# Patient Record
Sex: FEMALE | Race: WHITE | Employment: PART TIME | URBAN - METROPOLITAN AREA
[De-identification: names, ages, dates, MRNs, and addresses within clinical notes are randomized per-mention and may not be internally consistent; named-entity substitution may affect disease eponyms.]

---

## 2017-10-19 ENCOUNTER — TRANSFERRED RECORDS (OUTPATIENT)
Dept: HEALTH INFORMATION MANAGEMENT | Facility: CLINIC | Age: 37
End: 2017-10-19

## 2019-02-27 ENCOUNTER — TRANSFERRED RECORDS (OUTPATIENT)
Dept: HEALTH INFORMATION MANAGEMENT | Facility: CLINIC | Age: 39
End: 2019-02-27

## 2019-06-05 ENCOUNTER — TRANSFERRED RECORDS (OUTPATIENT)
Dept: HEALTH INFORMATION MANAGEMENT | Facility: CLINIC | Age: 39
End: 2019-06-05

## 2019-08-14 ENCOUNTER — TRANSFERRED RECORDS (OUTPATIENT)
Dept: HEALTH INFORMATION MANAGEMENT | Facility: CLINIC | Age: 39
End: 2019-08-14

## 2019-09-12 ENCOUNTER — TRANSFERRED RECORDS (OUTPATIENT)
Dept: HEALTH INFORMATION MANAGEMENT | Facility: CLINIC | Age: 39
End: 2019-09-12

## 2019-09-13 ENCOUNTER — TRANSFERRED RECORDS (OUTPATIENT)
Dept: HEALTH INFORMATION MANAGEMENT | Facility: CLINIC | Age: 39
End: 2019-09-13

## 2019-09-17 ENCOUNTER — TRANSFERRED RECORDS (OUTPATIENT)
Dept: HEALTH INFORMATION MANAGEMENT | Facility: CLINIC | Age: 39
End: 2019-09-17

## 2019-09-25 ENCOUNTER — TRANSFERRED RECORDS (OUTPATIENT)
Dept: HEALTH INFORMATION MANAGEMENT | Facility: CLINIC | Age: 39
End: 2019-09-25

## 2019-10-04 DIAGNOSIS — J45.909 ASTHMA: Primary | ICD-10-CM

## 2019-10-07 NOTE — TELEPHONE ENCOUNTER
RECORDS RECEIVED FROM: external    DATE RECEIVED: 12/19/19   NOTES STATUS DETAILS   OFFICE NOTE from referring provider Internal 10/4/19 THEA SCHAFER    OFFICE NOTE from other specialist Internal 10/4/19 THEA SCHAFER    DISCHARGE SUMMARY from hospital N/A    DISCHARGE REPORT from the ER N/A    MEDICATION LIST N/A    IMAGING  (NEED IMAGES AND REPORTS)     CT SCAN Received 10.19.17   CHEST XRAY (CXR) Received 2.27.19  6.8.18  1.2.18  10.18.18   TESTS     PULMONARY FUNCTION TESTING (PFT) In process  Scheduled 12/19/19      Action 10/7/19    Action Taken Records request was sent out 10/7/19 to [facility] for CXR and Chest CT        Action 10/7/19    Action Taken Records of appt report and PFTs recived from [facility] was sent into [facility] 10/7/19.        Action 10.14.19   Action Taken Pulled chest images     Action 10/29/19   Action Taken [facility] images pushed into PACS

## 2019-10-10 ENCOUNTER — TRANSFERRED RECORDS (OUTPATIENT)
Dept: HEALTH INFORMATION MANAGEMENT | Facility: CLINIC | Age: 39
End: 2019-10-10

## 2019-12-02 ENCOUNTER — DOCUMENTATION ONLY (OUTPATIENT)
Dept: CARE COORDINATION | Facility: CLINIC | Age: 39
End: 2019-12-02

## 2019-12-19 ENCOUNTER — ANCILLARY PROCEDURE (OUTPATIENT)
Dept: GENERAL RADIOLOGY | Facility: CLINIC | Age: 39
End: 2019-12-19
Payer: MEDICARE

## 2019-12-19 ENCOUNTER — OFFICE VISIT (OUTPATIENT)
Dept: PULMONOLOGY | Facility: CLINIC | Age: 39
End: 2019-12-19
Payer: MEDICARE

## 2019-12-19 ENCOUNTER — ANCILLARY PROCEDURE (OUTPATIENT)
Dept: CT IMAGING | Facility: CLINIC | Age: 39
End: 2019-12-19
Payer: MEDICARE

## 2019-12-19 ENCOUNTER — PRE VISIT (OUTPATIENT)
Dept: PULMONOLOGY | Facility: CLINIC | Age: 39
End: 2019-12-19

## 2019-12-19 VITALS
DIASTOLIC BLOOD PRESSURE: 85 MMHG | WEIGHT: 260 LBS | HEIGHT: 67 IN | RESPIRATION RATE: 17 BRPM | HEART RATE: 85 BPM | BODY MASS INDEX: 40.81 KG/M2 | OXYGEN SATURATION: 96 % | SYSTOLIC BLOOD PRESSURE: 134 MMHG

## 2019-12-19 DIAGNOSIS — J45.50: Primary | ICD-10-CM

## 2019-12-19 DIAGNOSIS — R06.02 SHORTNESS OF BREATH: ICD-10-CM

## 2019-12-19 DIAGNOSIS — J45.50: ICD-10-CM

## 2019-12-19 DIAGNOSIS — J45.909 ASTHMA: ICD-10-CM

## 2019-12-19 LAB
BASOPHILS # BLD AUTO: 0.1 10E9/L (ref 0–0.2)
BASOPHILS NFR BLD AUTO: 0.8 %
CRP SERPL-MCNC: 9.2 MG/L (ref 0–8)
DIFFERENTIAL METHOD BLD: NORMAL
EOSINOPHIL # BLD AUTO: 0.1 10E9/L (ref 0–0.7)
EOSINOPHIL NFR BLD AUTO: 1.3 %
ERYTHROCYTE [DISTWIDTH] IN BLOOD BY AUTOMATED COUNT: 13 % (ref 10–15)
ERYTHROCYTE [SEDIMENTATION RATE] IN BLOOD BY WESTERGREN METHOD: 7 MM/H (ref 0–20)
GRAM STN SPEC: NORMAL
HCT VFR BLD AUTO: 44.2 % (ref 35–47)
HGB BLD-MCNC: 14.6 G/DL (ref 11.7–15.7)
IMM GRANULOCYTES # BLD: 0 10E9/L (ref 0–0.4)
IMM GRANULOCYTES NFR BLD: 0.4 %
LYMPHOCYTES # BLD AUTO: 2.4 10E9/L (ref 0.8–5.3)
LYMPHOCYTES NFR BLD AUTO: 24.3 %
Lab: NORMAL
MCH RBC QN AUTO: 32.1 PG (ref 26.5–33)
MCHC RBC AUTO-ENTMCNC: 33 G/DL (ref 31.5–36.5)
MCV RBC AUTO: 97 FL (ref 78–100)
MONOCYTES # BLD AUTO: 0.7 10E9/L (ref 0–1.3)
MONOCYTES NFR BLD AUTO: 7 %
NEUTROPHILS # BLD AUTO: 6.4 10E9/L (ref 1.6–8.3)
NEUTROPHILS NFR BLD AUTO: 66.2 %
NRBC # BLD AUTO: 0 10*3/UL
NRBC BLD AUTO-RTO: 0 /100
PLATELET # BLD AUTO: 405 10E9/L (ref 150–450)
RBC # BLD AUTO: 4.55 10E12/L (ref 3.8–5.2)
SPECIMEN SOURCE: NORMAL
WBC # BLD AUTO: 9.7 10E9/L (ref 4–11)

## 2019-12-19 PROCEDURE — 86331 IMMUNODIFFUSION OUCHTERLONY: CPT

## 2019-12-19 PROCEDURE — 86200 CCP ANTIBODY: CPT

## 2019-12-19 PROCEDURE — 85025 COMPLETE CBC W/AUTO DIFF WBC: CPT

## 2019-12-19 PROCEDURE — 86235 NUCLEAR ANTIGEN ANTIBODY: CPT

## 2019-12-19 PROCEDURE — 86606 ASPERGILLUS ANTIBODY: CPT | Mod: 91

## 2019-12-19 PROCEDURE — 87206 SMEAR FLUORESCENT/ACID STAI: CPT

## 2019-12-19 PROCEDURE — 86140 C-REACTIVE PROTEIN: CPT

## 2019-12-19 PROCEDURE — 87106 FUNGI IDENTIFICATION YEAST: CPT

## 2019-12-19 PROCEDURE — 87116 MYCOBACTERIA CULTURE: CPT

## 2019-12-19 PROCEDURE — G0463 HOSPITAL OUTPT CLINIC VISIT: HCPCS

## 2019-12-19 PROCEDURE — 87070 CULTURE OTHR SPECIMN AEROBIC: CPT

## 2019-12-19 PROCEDURE — 82784 ASSAY IGA/IGD/IGG/IGM EACH: CPT

## 2019-12-19 PROCEDURE — 86255 FLUORESCENT ANTIBODY SCREEN: CPT

## 2019-12-19 PROCEDURE — 82785 ASSAY OF IGE: CPT

## 2019-12-19 PROCEDURE — 87077 CULTURE AEROBIC IDENTIFY: CPT

## 2019-12-19 PROCEDURE — 86431 RHEUMATOID FACTOR QUANT: CPT

## 2019-12-19 PROCEDURE — 87205 SMEAR GRAM STAIN: CPT

## 2019-12-19 PROCEDURE — 87107 FUNGI IDENTIFICATION MOLD: CPT | Mod: 59

## 2019-12-19 PROCEDURE — 36415 COLL VENOUS BLD VENIPUNCTURE: CPT

## 2019-12-19 PROCEDURE — 87015 SPECIMEN INFECT AGNT CONCNTJ: CPT

## 2019-12-19 PROCEDURE — 82787 IGG 1 2 3 OR 4 EACH: CPT

## 2019-12-19 PROCEDURE — 87185 SC STD ENZYME DETCJ PER NZM: CPT

## 2019-12-19 PROCEDURE — 86003 ALLG SPEC IGE CRUDE XTRC EA: CPT

## 2019-12-19 PROCEDURE — 86038 ANTINUCLEAR ANTIBODIES: CPT

## 2019-12-19 PROCEDURE — 85652 RBC SED RATE AUTOMATED: CPT

## 2019-12-19 PROCEDURE — 87102 FUNGUS ISOLATION CULTURE: CPT

## 2019-12-19 RX ORDER — IBUPROFEN 800 MG/1
800 TABLET, FILM COATED ORAL
COMMUNITY
Start: 2019-10-17

## 2019-12-19 RX ORDER — METHOCARBAMOL 500 MG/1
TABLET, FILM COATED ORAL
COMMUNITY
Start: 2018-11-03

## 2019-12-19 RX ORDER — ALBUTEROL SULFATE 0.83 MG/ML
2.5 SOLUTION RESPIRATORY (INHALATION)
COMMUNITY
Start: 2018-01-02

## 2019-12-19 RX ORDER — DEXTROAMPHETAMINE SACCHARATE, AMPHETAMINE ASPARTATE, DEXTROAMPHETAMINE SULFATE AND AMPHETAMINE SULFATE 5; 5; 5; 5 MG/1; MG/1; MG/1; MG/1
20 TABLET ORAL
COMMUNITY
Start: 2019-08-02

## 2019-12-19 RX ORDER — GABAPENTIN 300 MG/1
1200 CAPSULE ORAL
COMMUNITY
Start: 2019-07-31

## 2019-12-19 RX ORDER — MUPIROCIN 20 MG/G
OINTMENT TOPICAL
COMMUNITY
Start: 2019-11-18

## 2019-12-19 RX ORDER — FLUTICASONE PROPIONATE 50 MCG
1 SPRAY, SUSPENSION (ML) NASAL
COMMUNITY
Start: 2019-12-17

## 2019-12-19 RX ORDER — ACETAMINOPHEN 500 MG
500 TABLET ORAL
COMMUNITY

## 2019-12-19 RX ORDER — LAMOTRIGINE 25 MG/1
200 TABLET ORAL 2 TIMES DAILY
COMMUNITY
Start: 2018-09-27

## 2019-12-19 RX ORDER — ADAPALENE 45 G/G
GEL TOPICAL
COMMUNITY
Start: 2019-11-18

## 2019-12-19 RX ORDER — CHLORHEXIDINE GLUCONATE ORAL RINSE 1.2 MG/ML
SOLUTION DENTAL
COMMUNITY
Start: 2019-09-21

## 2019-12-19 ASSESSMENT — MIFFLIN-ST. JEOR: SCORE: 1876.98

## 2019-12-19 ASSESSMENT — ASTHMA QUESTIONNAIRES
QUESTION_1 LAST FOUR WEEKS HOW MUCH OF THE TIME DID YOUR ASTHMA KEEP YOU FROM GETTING AS MUCH DONE AT WORK, SCHOOL OR AT HOME: MOST OF THE TIME
QUESTION_4 LAST FOUR WEEKS HOW OFTEN HAVE YOU USED YOUR RESCUE INHALER OR NEBULIZER MEDICATION (SUCH AS ALBUTEROL): ONE OR TWO TIMES PER DAY
ACT_TOTALSCORE: 8
QUESTION_3 LAST FOUR WEEKS HOW OFTEN DID YOUR ASTHMA SYMPTOMS (WHEEZING, COUGHING, SHORTNESS OF BREATH, CHEST TIGHTNESS OR PAIN) WAKE YOU UP AT NIGHT OR EARLIER THAN USUAL IN THE MORNING: FOUR OR MORE NIGHTS A WEEK
QUESTION_5 LAST FOUR WEEKS HOW WOULD YOU RATE YOUR ASTHMA CONTROL: POORLY CONTROLLED
EMERGENCY_ROOM_LAST_YEAR_TOTAL: THREE OR MORE
QUESTION_2 LAST FOUR WEEKS HOW OFTEN HAVE YOU HAD SHORTNESS OF BREATH: MORE THAN ONCE A DAY

## 2019-12-19 ASSESSMENT — PAIN SCALES - GENERAL: PAINLEVEL: EXTREME PAIN (8)

## 2019-12-19 NOTE — PROGRESS NOTES
[facility]  Pulmonary Medicine  Visit Clinic Note  December 19, 2019         ASSESSMENT & PLAN     Cough and Shortness of Breath: She has been evaluated at [facility] and with Dr. Thomas.  I do not have the [facility] records.  She is noted to have elevated FeNO levels, and so her ICS has been increased.  She is on breo and arnuity.  From her history, she has been on 10 courses of antibiotics and 2 rounds of prednisone in the last year.     I would like to review her her [facility] records and see what the work up is besides FeNO for asthma.  From her history and 9/2019 CT scan, infection seems to be more of an issue.  High dose ICS could potentially be putting her at risk for infectious complications.      If it is not asthma, diagnoses to consider would be CVID, EGPA, ABPA, HP, GERD with aspiration, Mycobacterial infection    I will get labs including CRP, ESR, ABPA, ANCA, IRVIN IgE, IgG, HP panel. Will plan on sputum cultures and a sinus CT scan.     I will give her a phone call once I receive these results and we can discuss next steps.     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 She has had asthma since childhood and recently has had her asthma managed by her PCP, Dr. Godfrey, and by a pulmonologist, Dr. New at [facility]. She also has had her asthma evaluated at [facility].     Today she would like to discuss her asthma management and recurrent infections. She notes frequent productive coughing daily for ~ 1.5 years. The sputum production has varied in color, consistency, and amount. At times she notes feeling lightheaded and at  "times has chest and back pain exacerbated by coughing. Patient endorses frequent wheezing. She currently uses Zyrtec, Montelukast, and Breo and Ellipta to manage her respiratory symptoms.     Of note, she mentions that she is allergic to aspirin, and one time after taking Excedrin she was hospitalized d/t unclear but concerning respiratory symptoms. She uses several OTC pain medications daily.     Today she denies fevers, chills, weight loss. Denies recent travel or exposure to inhalants.     Per chart review, she was last seen by Dr. New on 08/15/19. \"She has been on fluticasone 400 mcg per day in the form of Breo Ellipta 200/25 and Arnuity 200 mcg. She had multiple cultures positive for hemophilus influenza, and was on multiple courses of antibiotics including Augmentin and Bactrim. Levofloxacin was avoided d/t a history of Achilles tendon rupture. She has a history of severe persistent asthma with elevated nitric oxide levels in the past. In the past when she was followed by pulmonary at [facility], they titrated Asmanex up to 12 puffs b.i.d. based on fractional exhaled nitric oxide levels. She had an elevated nitric oxide level at 114 on 9/26/2018, which improved to 39 on 10/17/2008 and was 19 on 5/30/2019. Now on the 400 mcg of fluticasone, she is at 28.\"      SH:  - Non-smoker, 2nd hand smoke (parents); no vaping  - Rare marijuana use  - Lives in [place]  - Work as a PCA  - moderate EtOH use           Past Medical and Surgical History:     Past Medical History:  Past Medical History:   Diagnosis Date     ADHD (attention deficit hyperactivity disorder)     Asthma   Persistent asthma, steroid dependent     Bilateral chronic knee pain     Bipolar 1 disorder (HCC)     Delayed emergence from general anesthesia     Depression     Fibromyalgia     GERD (gastroesophageal reflux disease)     HCD (health care directive) 10/10/2017     DAPHNE (obstructive sleep apnea)     Osteomyelitis (HCC)     Osteopenia     " Seasonal allergic rhinitis     Seizures (HCC)   ? partial seizures / none for somr time. due brain trauma obtained in 2016     Vitamin D deficiency     Past Surgical History:   Procedure Laterality Date     GASTRIC FUNDOPLICATION 1998     KNEE SURGERY Right 4/2009     KNEE SURGERY Left 12/2004     REMOVAL GALLBLADDER     SIMON-EN-Y GASTRIC BYPASS 12/06     SINUS SURGERY     TOTAL KNEE ARTHROPLASTY Left 10/10/2017   Procedure: LEFT ARTHROPLASTY TOTAL KNEE; Surgeon: Yakov Choi MD; Location: [facility] MAIN OR         Family History:     History reviewed. No pertinent family history.         Social History:     Social History     Socioeconomic History     Marital status: Single     Spouse name: Not on file     Number of children: Not on file     Years of education: Not on file     Highest education level: Not on file   Occupational History     Not on file   Social Needs     Financial resource strain: Not on file     Food insecurity:     Worry: Not on file     Inability: Not on file     Transportation needs:     Medical: Not on file     Non-medical: Not on file   Tobacco Use     Smoking status: Never Smoker     Smokeless tobacco: Never Used   Substance and Sexual Activity     Alcohol use: Yes     Comment: occassional      Drug use: Yes     Types: Marijuana     Comment: rare     Sexual activity: Not on file   Lifestyle     Physical activity:     Days per week: Not on file     Minutes per session: Not on file     Stress: Not on file   Relationships     Social connections:     Talks on phone: Not on file     Gets together: Not on file     Attends Jew service: Not on file     Active member of club or organization: Not on file     Attends meetings of clubs or organizations: Not on file     Relationship status: Not on file     Intimate partner violence:     Fear of current or ex partner: Not on file     Emotionally abused: Not on file     Physically abused: Not on file     Forced sexual activity: Not on file  "  Other Topics Concern     Parent/sibling w/ CABG, MI or angioplasty before 65F 55M? Not Asked   Social History Narrative     Not on file            Medications:     Current Outpatient Medications   Medication     acetaminophen (TYLENOL) 500 MG tablet     adapalene (DIFFERIN) 0.1 % external gel     albuterol (PROVENTIL) (2.5 MG/3ML) 0.083% neb solution     amphetamine-dextroamphetamine (ADDERALL) 20 MG tablet     Cetirizine HCl (ZYRTEC PO)     chlorhexidine (PERIDEX) 0.12 % solution     cholecalciferol ( ULTRA STRENGTH) 50 MCG (2000 UT) CAPS     fluticasone (FLONASE) 50 MCG/ACT nasal spray     Fluticasone Furoate (ARNUITY ELLIPTA IN)     Fluticasone Furoate-Vilanterol (BREO ELLIPTA IN)     gabapentin (NEURONTIN) 300 MG capsule     ibuprofen (ADVIL/MOTRIN) 800 MG tablet     lamoTRIgine (LAMICTAL) 25 MG tablet     methocarbamol (ROBAXIN) 500 MG tablet     minoxidil (ROGAINE) 2 % external solution     Montelukast Sodium (SINGULAIR PO)     mupirocin (BACTROBAN) 2 % external ointment     omeprazole (PRILOSEC) 20 MG DR capsule     No current facility-administered medications for this visit.             Review of Systems:     A complete review of systems was otherwise negative except as noted in the HPI.    PHYSICAL EXAM:  /85   Pulse 85   Resp 17   Ht 1.702 m (5' 7\")   Wt 117.9 kg (260 lb)   SpO2 96%   BMI 40.72 kg/m       General: accompanied by daughter, No apparent distress  Eyes: Anicteric  Nose: Nasal mucosa with no edema or hyperemia.  No polyps  Ears: Hearing grossly normal  Mouth: Oral mucosa is moist, without any lesions. No oropharyngeal exudate.  Neck: supple, no thyromegaly  Lymphatics: No cervical or supraclavicular nodes  Respiratory: Good air movement. No crackles. No rhonchi. No wheezes  Cardiac: RRR, normal S1, S2. No murmurs.   Abdomen: Soft, NT/ND  Musculoskeletal: Extremities normal. No clubbing. No cyanosis. No edema.  Skin: No rash on limited exam  Neuro: Normal mentation. Normal " speech.  Psych: tangential but redirectable         Data:   All laboratory and imaging data reviewed.      PFT:   FEV1/FVC ratio 78%. FEV1 2.78 (84% predicted). FVC 3.54 (87% predicted). DLCO 12.97 (45% predicted)       PFT Interpretation:  No airflow obstruction. Impaired diffusion capacity.     CXR:   12/19/19 (pending)    Chest CT:   9/2019: tree-in-bud opacities. Left lower lobe consolidative changes.

## 2019-12-20 LAB
A FUMIGATUS IGE QN: <0.1 KU(A)/L
IGG SERPL-MCNC: 509 MG/DL (ref 610–1616)
IGG1 SER-MCNC: 339 MG/DL (ref 382–929)
IGG2 SER-MCNC: 133 MG/DL (ref 242–700)
IGG3 SER-MCNC: 25 MG/DL (ref 22–176)
IGG4 SER-MCNC: 11 MG/DL (ref 4–86)
RHEUMATOID FACT SER NEPH-ACNC: 10 IU/ML (ref 0–20)

## 2019-12-20 ASSESSMENT — ASTHMA QUESTIONNAIRES: ACT_TOTALSCORE: 8

## 2019-12-21 LAB
A FUMIGATUS IGG SER-MCNC: 6.77 MCG/ML
ENA SCL70 IGG SER IA-ACNC: <0.2 AI (ref 0–0.9)

## 2019-12-22 LAB
ACID FAST STN SPEC QL: NORMAL
BACTERIA SPEC CULT: ABNORMAL
BACTERIA SPEC CULT: ABNORMAL
SPECIMEN SOURCE: ABNORMAL
SPECIMEN SOURCE: NORMAL

## 2019-12-23 LAB
A FLAVUS AB SER QL ID: NORMAL
A FUMIGATUS1 AB SER QL ID: NORMAL
A FUMIGATUS2 AB SER QL ID: NORMAL
A FUMIGATUS3 AB SER QL ID: NORMAL
A FUMIGATUS6 AB SER QL ID: NORMAL
A PULLULANS AB SER QL ID: NORMAL
ASPERGILLUS AB SER QL ID: NORMAL
CCP AB SER IA-ACNC: <1 U/ML
IGE SERPL-ACNC: 14 KIU/L (ref 0–114)
PIGEON DROP IGE QN: NORMAL
S RECTIVIRGULA AB SER QL ID: NORMAL
S VIRIDIS AB SER QL ID: NORMAL
T CANDIDUS AB SER QL: NORMAL
T VULGARIS AB SER QL ID: NORMAL

## 2019-12-24 LAB
ANA SER QL IF: NEGATIVE
ANCA AB PATTERN SER IF-IMP: NORMAL
C-ANCA TITR SER IF: NORMAL {TITER}

## 2019-12-31 DIAGNOSIS — J32.0 CHRONIC MAXILLARY SINUSITIS: Primary | ICD-10-CM

## 2019-12-31 RX ORDER — CEFPODOXIME PROXETIL 200 MG/1
200 TABLET, FILM COATED ORAL 2 TIMES DAILY
Qty: 42 TABLET | Refills: 0 | Status: SHIPPED | OUTPATIENT
Start: 2019-12-31 | End: 2021-01-25

## 2019-12-31 NOTE — Clinical Note
Hello, Can you please get all of Shadia's [facility] information put into care everywhere?Kleber

## 2019-12-31 NOTE — PROGRESS NOTES
I called to discuss results with Ms Mcgrath.    Her sputum results show heavy growth of hemophilus. She has grown this in the past as well.  Her blood work up has been normal, with the exception of low IgG levels.  It is possible she has CVID and this is putting her at risk for repeat infections.   For now, I will treat with 3 weeks of cefpodoxime. She will call us back in 3 weeks to let us know how her symptoms are doing.   In the meantime, we still need to get records from the [facility].   Kleber Guy

## 2020-01-14 DIAGNOSIS — J32.0 CHRONIC MAXILLARY SINUSITIS: ICD-10-CM

## 2020-01-15 RX ORDER — CEFPODOXIME PROXETIL 200 MG/1
200 TABLET, FILM COATED ORAL 2 TIMES DAILY
Qty: 42 TABLET | Refills: 0 | OUTPATIENT
Start: 2020-01-15

## 2020-01-15 NOTE — TELEPHONE ENCOUNTER
Unable to leave message for pt to discuss refill request, VM full. No alternative contact details on file.

## 2020-01-16 ENCOUNTER — CARE COORDINATION (OUTPATIENT)
Dept: PULMONOLOGY | Facility: CLINIC | Age: 40
End: 2020-01-16

## 2020-01-16 ENCOUNTER — TELEPHONE (OUTPATIENT)
Facility: CLINIC | Age: 40
End: 2020-01-16

## 2020-01-16 DIAGNOSIS — J41.1 MUCOPURULENT CHRONIC BRONCHITIS (H): Primary | ICD-10-CM

## 2020-01-16 DIAGNOSIS — J32.0 CHRONIC MAXILLARY SINUSITIS: ICD-10-CM

## 2020-01-16 RX ORDER — CEFPODOXIME PROXETIL 200 MG/1
200 TABLET, FILM COATED ORAL 2 TIMES DAILY
Qty: 42 TABLET | Refills: 0 | OUTPATIENT
Start: 2020-01-16

## 2020-01-16 NOTE — PROGRESS NOTES
Contacted pt to follow up on request for Vantin refill and symptoms.  Pt states medication has not worked.  She was shopping when I called and didn't want to discuss specific symptoms but stated that they are the same as they were when she spoke with Dr. Guy on 12/31.  She is requesting a call back from the provider to discuss next steps.

## 2020-01-16 NOTE — TELEPHONE ENCOUNTER
Attempted to call pt but no answer and voicemail box was full. Will continue to try to reach the pt.

## 2020-01-17 NOTE — TELEPHONE ENCOUNTER
Spoke with pt and she stated she wants to have her labs drawn at [facility] in [place], [place]. Faxed lab orders to clinic at [phone].

## 2020-01-23 LAB
FUNGUS SPEC CULT: ABNORMAL
SPECIMEN SOURCE: ABNORMAL

## 2020-01-30 ENCOUNTER — TELEPHONE (OUTPATIENT)
Dept: PULMONOLOGY | Facility: CLINIC | Age: 40
End: 2020-01-30

## 2020-01-30 DIAGNOSIS — D83.9 CVID (COMMON VARIABLE IMMUNODEFICIENCY) (H): ICD-10-CM

## 2020-01-30 DIAGNOSIS — D83.9 CVID (COMMON VARIABLE IMMUNODEFICIENCY) (H): Primary | ICD-10-CM

## 2020-01-30 DIAGNOSIS — J45.909 ASTHMA: Primary | ICD-10-CM

## 2020-01-30 NOTE — TELEPHONE ENCOUNTER
Spoke with patient and gave lab results and made her apt with Dr Baca and gave that apt date and time as well per Dr Guy advisement.

## 2020-01-30 NOTE — TELEPHONE ENCOUNTER
----- Message from Simone Guy MD sent at 1/30/2020  1:29 PM CST -----  IgG level is low on a second draw.  I would like to refer her to immunology (Dr. Philippe Baca) for evaluation of CVID.   Can you please call the patient and let her know about the test result and set up the referral?  -Kleber

## 2020-02-04 NOTE — TELEPHONE ENCOUNTER
FUTURE VISIT INFORMATION      FUTURE VISIT INFORMATION:    Date: 2.24.20    Time: 11:20    Location:  Allergy  REFERRAL INFORMATION:    Referring provider:  Dr. Simone Guy    Referring providers clinic:  Pulmonology    Reason for visit/diagnosis  CVID    RECORDS REQUESTED FROM:       Clinic name Comments Records Status Imaging Status   Pulmonology 12.19.19 Dr. Guy Epic          Chest Xray 12.19.19 Epic Pacs   CT Sinus [facility]   Pertinent Labs 1.24.20, 12.19.19 Epic N/A   PFT 9.12.19, 8.14.19

## 2020-02-15 LAB
MYCOBACTERIUM SPEC CULT: NORMAL
MYCOBACTERIUM SPEC CULT: NORMAL
SPECIMEN SOURCE: NORMAL

## 2020-02-17 ENCOUNTER — TELEPHONE (OUTPATIENT)
Dept: ALLERGY | Facility: CLINIC | Age: 40
End: 2020-02-17

## 2020-02-17 NOTE — TELEPHONE ENCOUNTER
Called patient to remind her to be off of antihistamines, oral steroids, and H2 blockers prior to her appointment on 2/24/20.

## 2020-02-24 ENCOUNTER — PRE VISIT (OUTPATIENT)
Dept: ALLERGY | Facility: CLINIC | Age: 40
End: 2020-02-24

## 2020-05-11 ENCOUNTER — TELEPHONE (OUTPATIENT)
Dept: ALLERGY | Facility: CLINIC | Age: 40
End: 2020-05-11

## 2020-05-11 NOTE — TELEPHONE ENCOUNTER
LVM informing patient that due to the COVID-19 virus, we are minimizing all non-emergent appointments and would like to offer her a virtual visit.     Clinic phone number provided for patient to call back to discuss appointment details.

## 2020-05-14 ENCOUNTER — TELEPHONE (OUTPATIENT)
Dept: ALLERGY | Facility: CLINIC | Age: 40
End: 2020-05-14

## 2020-05-14 NOTE — TELEPHONE ENCOUNTER
LVM reminding patient her upcoming appointment has been changed to a video visit. Provided patient with clinic phone number to call back to discuss details.

## 2020-05-18 ENCOUNTER — VIRTUAL VISIT (OUTPATIENT)
Dept: ALLERGY | Facility: CLINIC | Age: 40
End: 2020-05-18

## 2020-05-18 ENCOUNTER — TELEPHONE (OUTPATIENT)
Dept: ALLERGY | Facility: CLINIC | Age: 40
End: 2020-05-18

## 2020-05-18 DIAGNOSIS — D83.9 CVID (COMMON VARIABLE IMMUNODEFICIENCY) (H): ICD-10-CM

## 2020-05-18 NOTE — PROGRESS NOTES
Reason for Visit  Shadia Mcgrath is a 39 year old female who is referred by Fahad Godfrey for immune deficiency.     Allergy HPI  She did not know she had an immune disorder.  A few years ago had some diff breathing and went to a clinic and then sent via 911 to hospital.  Took her 7 hours to get her breathing and can now breath but the 'crap in my lungs is still there.' Has been doing that for 2 years.  Lots of sinus infections, has had sinus surgery and last was in 1999.  No menigitis or osteomyelits but had knee surgery and doctors 'screwed up' and there was an infection and IV antibiotics.  She has been on oral steroids since age 26 for breathing issues and was on them straight for 15 years but 2-3 years ago was able to go off them. 20 years ago had allergy testing an per her report was only positive to mold.     Social: Works in healthcare, has a cat and dog, no smoking.  Smoked as a teenager.  No chemical exposures.    Family Hx:  No immune disorders in the family.       The patient was seen and examined by Marko Barnett MD, MD   Current Outpatient Medications   Medication     acetaminophen (TYLENOL) 500 MG tablet     adapalene (DIFFERIN) 0.1 % external gel     albuterol (PROVENTIL) (2.5 MG/3ML) 0.083% neb solution     amphetamine-dextroamphetamine (ADDERALL) 20 MG tablet     cefpodoxime (VANTIN) 200 MG tablet     Cetirizine HCl (ZYRTEC PO)     chlorhexidine (PERIDEX) 0.12 % solution     cholecalciferol ( ULTRA STRENGTH) 50 MCG (2000 UT) CAPS     fluticasone (FLONASE) 50 MCG/ACT nasal spray     Fluticasone Furoate (ARNUITY ELLIPTA IN)     Fluticasone Furoate-Vilanterol (BREO ELLIPTA IN)     gabapentin (NEURONTIN) 300 MG capsule     ibuprofen (ADVIL/MOTRIN) 800 MG tablet     lamoTRIgine (LAMICTAL) 25 MG tablet     methocarbamol (ROBAXIN) 500 MG tablet     minoxidil (ROGAINE) 2 % external solution     Montelukast Sodium (SINGULAIR PO)     mupirocin (BACTROBAN) 2 % external ointment      omeprazole (PRILOSEC) 20 MG DR capsule     No current facility-administered medications for this visit.      Allergies   Allergen Reactions     Amitriptyline Headache     Nortriptyline Nausea and Vomiting     Theophylline Other (See Comments)     Seizures        Aspirin Anxiety and Palpitations     Social History     Socioeconomic History     Marital status: Single     Spouse name: Not on file     Number of children: Not on file     Years of education: Not on file     Highest education level: Not on file   Occupational History     Not on file   Social Needs     Financial resource strain: Not on file     Food insecurity     Worry: Not on file     Inability: Not on file     Transportation needs     Medical: Not on file     Non-medical: Not on file   Tobacco Use     Smoking status: Never Smoker     Smokeless tobacco: Never Used   Substance and Sexual Activity     Alcohol use: Yes     Comment: occassional      Drug use: Yes     Types: Marijuana     Comment: rare     Sexual activity: Not on file   Lifestyle     Physical activity     Days per week: Not on file     Minutes per session: Not on file     Stress: Not on file   Relationships     Social connections     Talks on phone: Not on file     Gets together: Not on file     Attends Restorationist service: Not on file     Active member of club or organization: Not on file     Attends meetings of clubs or organizations: Not on file     Relationship status: Not on file     Intimate partner violence     Fear of current or ex partner: Not on file     Emotionally abused: Not on file     Physically abused: Not on file     Forced sexual activity: Not on file   Other Topics Concern     Parent/sibling w/ CABG, MI or angioplasty before 65F 55M? Not Asked   Social History Narrative     Not on file     No past medical history on file.  No past surgical history on file.  No family history on file.      ROS   A complete ROS was otherwise negative except as noted in the HPI and the end of the  note.  There were no vitals taken for this visit.  Exam:   Visit was via the phone.  Results:488 on January 2020.  IgG       Assessment and plan: Shadia has had a cough for the last 2 years with productive sputum and pulmonary medicine recently noted a IgG of 488.  We will access for CVID with other Ig levels and vaccine titers. She wants the labs sent to [facility].  We talked about CVID and potential treatments.  If the levels are normal we will consider skin testing for environmental allergens.     Visit was 15 minutes via the phone.  We will have to call her to go over the labs and then come up with a plan to possibly revaccinate and then if we need to start immune replacement therapy.  Addendum:  IgG is very low at 431.  IgA and M are normal.  Diptheria levels normal.  Pneumococcal levels not present.  Haemophilus levels are non-protective.  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  I called her and discussed the results and plan and she is in agreement.  We will have her get a Hib vaccine from her PCP.  We will also start the paperwork for IgG replacement.  I am leaving this clinic at the end of this month and she can folllow with the new provider that is coming here.  I recommend that even though she wants to follow me to my new clinic.  She should follow via a virtual visit with a IgG level drawn before her 3rd infusion and the follow up scheduled 1 week after that.

## 2020-05-18 NOTE — NURSING NOTE
Chief Complaint   Patient presents with     Consult     Allergies. Patient reports history of lung disease. Referred by pulmonary. Symptoms include difficulty breathing and  productive cough.

## 2020-05-20 ENCOUNTER — TRANSFERRED RECORDS (OUTPATIENT)
Dept: HEALTH INFORMATION MANAGEMENT | Facility: CLINIC | Age: 40
End: 2020-05-20

## 2020-07-06 ENCOUNTER — TELEPHONE (OUTPATIENT)
Dept: ALLERGY | Facility: CLINIC | Age: 40
End: 2020-07-06

## 2020-07-06 PROBLEM — D83.9 CVID (COMMON VARIABLE IMMUNODEFICIENCY) (H): Chronic | Status: ACTIVE | Noted: 2020-07-06

## 2020-07-06 RX ORDER — ACETAMINOPHEN 325 MG/1
650 TABLET ORAL ONCE
Status: CANCELLED
Start: 2020-08-01

## 2020-07-06 RX ORDER — DIPHENHYDRAMINE HCL 25 MG
50 CAPSULE ORAL ONCE
Status: CANCELLED
Start: 2020-08-01

## 2020-07-06 RX ORDER — HEPARIN SODIUM,PORCINE 10 UNIT/ML
5 VIAL (ML) INTRAVENOUS
Status: CANCELLED | OUTPATIENT
Start: 2020-08-01

## 2020-07-06 RX ORDER — HEPARIN SODIUM (PORCINE) LOCK FLUSH IV SOLN 100 UNIT/ML 100 UNIT/ML
5 SOLUTION INTRAVENOUS
Status: CANCELLED | OUTPATIENT
Start: 2020-08-01

## 2020-07-06 NOTE — TELEPHONE ENCOUNTER
[facility] Call Center    Phone Message    May a detailed message be left on voicemail: yes , Patient is wanting to get a call back from Dr. Barnett. Please advise.     Reason for Call: Order(s): Other:   Reason for requested: H.I.B Vaccine  Date needed: asap  Provider name: Ericka      Action Taken: Message routed to:  [facility] Allergy     Travel Screening: Not Applicable

## 2020-07-07 NOTE — TELEPHONE ENCOUNTER
Per [facility] nurse, clinic needs provider recommendation for patient to receive Hib vaccine. Office notes faxed to clinic at .

## 2020-07-07 NOTE — TELEPHONE ENCOUNTER
NIKOM with [facility], patient's primary care clinic. Informed them that provider would like patient to receive Hib vaccine - Haemophilus influenzae type B. Provided clinic phone number for staff to call with any questions or concerns.

## 2020-08-05 ENCOUNTER — TELEPHONE (OUTPATIENT)
Dept: ALLERGY | Facility: CLINIC | Age: 40
End: 2020-08-05

## 2020-08-05 NOTE — TELEPHONE ENCOUNTER
Contacted patient to schedule follow up appointment with another provider. Patient prefers to stay with Dr. Baca and will follow up with him at his private practice.

## 2020-08-24 ENCOUNTER — TELEPHONE (OUTPATIENT)
Dept: ALLERGY | Facility: CLINIC | Age: 40
End: 2020-08-24

## 2020-08-24 NOTE — TELEPHONE ENCOUNTER
Could you start PA for this patient's IVIG? Should would like to get it at [facility] if possible.

## 2020-08-27 ENCOUNTER — VIRTUAL VISIT (OUTPATIENT)
Dept: ALLERGY | Facility: CLINIC | Age: 40
End: 2020-08-27

## 2020-08-27 DIAGNOSIS — J30.0 CHRONIC VASOMOTOR RHINITIS: ICD-10-CM

## 2020-08-27 DIAGNOSIS — J31.0 CHRONIC RHINITIS: ICD-10-CM

## 2020-08-27 DIAGNOSIS — D80.1 HYPOGAMMAGLOBULINEMIA (H): Primary | ICD-10-CM

## 2020-08-27 RX ORDER — AZELASTINE 1 MG/ML
2 SPRAY, METERED NASAL 2 TIMES DAILY PRN
Qty: 30 ML | Refills: 3 | Status: SHIPPED | OUTPATIENT
Start: 2020-08-27

## 2020-08-27 ASSESSMENT — ENCOUNTER SYMPTOMS
NAUSEA: 0
MYALGIAS: 1
VOMITING: 0
JOINT SWELLING: 0
EYE REDNESS: 0
ARTHRALGIAS: 0
NERVOUS/ANXIOUS: 1
EYE DISCHARGE: 0
ACTIVITY CHANGE: 1
SHORTNESS OF BREATH: 1
EYE ITCHING: 0
WHEEZING: 0
RHINORRHEA: 1
SORE THROAT: 1
CHEST TIGHTNESS: 1
FACIAL SWELLING: 0
SINUS PRESSURE: 0
FEVER: 0
CHILLS: 0
HEADACHES: 1
COUGH: 1
DIARRHEA: 0

## 2020-08-27 ASSESSMENT — PATIENT HEALTH QUESTIONNAIRE - PHQ9: SUM OF ALL RESPONSES TO PHQ QUESTIONS 1-9: 13

## 2020-08-27 NOTE — NURSING NOTE
"[facility]:  PHQ-9 Screening Note  SITUATION/BACKGROUND                                                    Shadia Mcgrath is a 40 year old female who completed the PHQ-9 assessment for depression and Score is >9.    Onset of symptoms: worsening  Trigger:   Recent related events: Separation with   Prior history of suicide attempt or self harm:   Risk Factors:   History of depression or mental illness: Yes  Medications reviewed:      ASSESSMENT      A. Are any of the following present?      Suicidal thoughts with a plan and means to carry out the plan?    Intent to harm others    Altered mental status: confusion, delusional, psychotic NO - go to B   B. Are any of the following present?      Suicidal thoughts without a plan or means to carry out the plan    New onset of delusional ideas    Past inpatient admission for depression    New onset and recent change or addition of new medication NO - go to C   C. Are any of the following present?      Previous suicide attempts    Depression interfering with ability to work or function    Loss of appetite and eating poorly    Abrupt cessation of drugs (OTC or RX), alcohol or caffeine    Drug or alcohol abuse YES -  Provide patient with crisis resources.     Place referral to behavioral health team for \"regular\" follow-up   D. Are several of the following present?      Difficulty concentrating    Difficulty sleeping    Reduced interest in sexual activity or impotency    Irregular or absent menstruation    No interest in activity    Change in interpersonal relationships    Increased use/abuse of alcohol or drugs    Pregnant or recent child birth    Recent major life change    History of depression YES -  Follow-up with PCP for appointment and follow home care instructions.    Place referral to behavioral health team for \"regular\" follow-up.         PLAN      Home Care Instructions:   If currently in counseling, call counselor for " appointment  Contact friends or family for support  Increase exercise and enjoyable activities  East a well-balanced diet, drink plenty of fluids and rest as needed    Report the following to your PCP:   Suicidal thoughts without a plan or means to carry out the plan    Seek emergency care immediately if any of the following occur:   Suicidal thoughts and plan and means to carry out the plan  Injury to self or others    BEHAVIORAL HEALTH TEAMS      [facility] - Behavioral Health Team    [facility] Pager: [phone]    [place]  - Behavioral Health Team    Pager number: [phone]    Referral to Behavioral Health    BEHAVIORAL / SPIRITUAL HEALTH [facility] [[id number]}    RESOURCES      Referral placed to Behavioral Health Team. Encouraged patient to utilize family and friends for support and to continue to eat healthy/increase exercise. Advised patient to seek emergency medical help for worsening symptoms.     Clau Sahu RN        Copyright 2016 [employer]

## 2020-08-27 NOTE — LETTER
"    8/27/2020         RE: Shadia Mcgrath  [address]  [facility] [place] 2[place] [address]        Dear Colleague,    Thank you for referring your patient, Shadia Mcgrath, to the [facility]. Please see a copy of my visit note below.    Shadia Mcgrath is a 40 year old female who is being evaluated via a billable telephone visit.      The patient has been notified of following:     \"This telephone visit will be conducted via a call between you and your physician/provider. We have found that certain health care needs can be provided without the need for a physical exam.  This service lets us provide the care you need with a short phone conversation.  If a prescription is necessary we can send it directly to your pharmacy.  If lab work is needed we can place an order for that and you can then stop by our lab to have the test done at a later time.    Telephone visits are billed at different rates depending on your insurance coverage. During this emergency period, for some insurers they may be billed the same as an in-person visit.  Please reach out to your insurance provider with any questions.    If during the course of the call the physician/provider feels a telephone visit is not appropriate, you will not be charged for this service.\"    Patient has given verbal consent for Telephone visit?  Yes    What phone number would you like to be contacted at? [phone]    How would you like to obtain your AVS? Mail a copy      Perez Patino MD      This is a follow up telephone visit. A new patient for me. Transfer from Dr. Baca.  Shadia Mcgrath  is a 40-year-old female previously diagnosed with CVID and asthma.  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  The patient states that she has had asthma since she was a child. Has a history of ICU stays.   She has primarily had a " long-term history of productive (yellow) cough, wheezing, and shortness of breath for approximately 1-2 years.    She has a history of multiple cultures being positive for Haemophilus influenza and was on various courses of antibiotics, including Augmentin and Bactrim, according to Pulmonology notes.  Per notes, she does have a history of elevated FeNO levels.  According to the notes, PFTs were consistent with no airflow obstruction but impaired diffusion capacity, DLCO 12.97 (45% of predicted).  In September 2019, chest CT demonstrated tree-in-bud opacities and left lower lobe consolidative changes.    For her chest symptoms she is on Breo 200/25 1 puff once daily, Arnuity 200 mcg 1 puff once daily. She uses albuterol several times daily. Sometimes albuterol is helpful, and sometimes it doesn't.  Her chest triggers: smoke, odors, heat.    She also has perennial nasal congestion, clear, sometimes thick rhinorrhea in her .  She takes cetirizine 10 mg by mouth and Flonase 1 spray in each nostril twice daily.   She was recently treated for a sinus infection.  She reports had a history of sinus surgery in her 20s.Sinus CT performed in December 2019 with signs of chronic maxillary and ethmoid sinusitis and postsurgical changes of functional endoscopic sinus surgery.      In December 2019, CBC was within normal limits without hypereosinophilia.  Negative hypersensitivity panel.  Elevated CRP 9.2 (0-8).  IgE was within normal limits, 14 kiu/L  IgG was 509 (610-1616).  IgG 1 339 (382-929), IgG2 133 (2 ), IgG3 25 (), and IgG4 11 (4-86).  Repeat IgG in January 2020 was 488.    This is when the patient saw Dr. Baca, who used to work as an allergist[place]ologist [place] [facility] [place]  She had lab work ordered by Dr. Baca at an outside lab.  Diphtheria IgG was 0.12, which is considered to be protective.  IgA was 207 (), IgG 431 (767-1590).  She had unprotected Haemophilus influenza IgG, less than 0.15.    Dr. Baca  asked the patient to get Hib vaccine and recommended starting IVIG.    Patient Active Problem List   Diagnosis     CVID (common variable immunodeficiency) (H)       Past Medical History:   Diagnosis Date     Uncomplicated asthma        Past Surgical History:   Procedure Laterality Date     SINUS SURGERY      per patient, she had to drill holes in sinuses     Social History     Socioeconomic History     Marital status: Single     Spouse name: None     Number of children: None     Years of education: None     Highest education level: None   Occupational History     None   Social Needs     Financial resource strain: None     Food insecurity     Worry: None     Inability: None     Transportation needs     Medical: None     Non-medical: None   Tobacco Use     Smoking status: Never Smoker     Smokeless tobacco: Never Used   Substance and Sexual Activity     Alcohol use: Yes     Comment: occassional      Drug use: Yes     Types: Marijuana     Comment: rare     Sexual activity: None   Lifestyle     Physical activity     Days per week: None     Minutes per session: None     Stress: None   Relationships     Social connections     Talks on phone: None     Gets together: None     Attends Baptism service: None     Active member of club or organization: None     Attends meetings of clubs or organizations: None     Relationship status: None     Intimate partner violence     Fear of current or ex partner: None     Emotionally abused: None     Physically abused: None     Forced sexual activity: None   Other Topics Concern     Parent/sibling w/ CABG, MI or angioplasty before 65F 55M? Not Asked   Social History Narrative    08/27/20    ENVIRONMENTAL HISTORY: [place] with a gas furnace. They do not have central air conditioning. The patient's bedroom is furnished with hard ryan in bedroom and fabric window coverings.  Pets inside the house include 13 cat(s) (most are  outside) and 1 dog(s). There is history of cockroach or mice infestation (occasionally see rats). There is/are 0 smokers in the house.  The house does have a damp basement.            Review of Systems   Constitutional: Positive for activity change (going to gym). Negative for chills and fever.   HENT: Positive for ear pain, nosebleeds (right side), postnasal drip, rhinorrhea and sore throat. Negative for congestion, dental problem, facial swelling, sinus pressure and sneezing.         Mouth dry    Eyes: Negative for discharge, redness and itching.   Respiratory: Positive for cough, chest tightness and shortness of breath. Negative for wheezing.    Cardiovascular: Positive for chest pain.   Gastrointestinal: Negative for diarrhea, nausea and vomiting.   Musculoskeletal: Positive for myalgias. Negative for arthralgias and joint swelling.   Skin: Negative for rash.   Neurological: Positive for headaches.   Psychiatric/Behavioral: Positive for behavioral problems (ADHD). The patient is nervous/anxious.            Current Outpatient Medications:      azelastine (ASTELIN) 0.1 % nasal spray, Spray 2 sprays into both nostrils 2 times daily as needed for rhinitis, Disp: 30 mL, Rfl: 3     acetaminophen (TYLENOL) 500 MG tablet, Take 500 mg by mouth, Disp: , Rfl:      adapalene (DIFFERIN) 0.1 % external gel, , Disp: , Rfl:      albuterol (PROVENTIL) (2.5 MG/3ML) 0.083% neb solution, Inhale 2.5 mg into the lungs, Disp: , Rfl:      amphetamine-dextroamphetamine (ADDERALL) 20 MG tablet, Take 20 mg by mouth, Disp: , Rfl:      cefpodoxime (VANTIN) 200 MG tablet, Take 1 tablet (200 mg) by mouth 2 times daily, Disp: 42 tablet, Rfl: 0     Cetirizine HCl (ZYRTEC PO), , Disp: , Rfl:      chlorhexidine (PERIDEX) 0.12 % solution, Swish and spit 15 mL two times a day. Start after you finish your oral antibiotic. Swish for 30 seconds then spit, do NOT swallow, Disp: , Rfl:      cholecalciferol ( ULTRA STRENGTH) 50 MCG (2000 UT) CAPS,  Take 2,000 Units by mouth, Disp: , Rfl:      fluticasone (FLONASE) 50 MCG/ACT nasal spray, Spray 1 spray in nostril, Disp: , Rfl:      Fluticasone Furoate (ARNUITY ELLIPTA IN), , Disp: , Rfl:      Fluticasone Furoate-Vilanterol (BREO ELLIPTA IN), , Disp: , Rfl:      gabapentin (NEURONTIN) 300 MG capsule, Take 1,200 mg by mouth, Disp: , Rfl:      ibuprofen (ADVIL/MOTRIN) 800 MG tablet, Take 800 mg by mouth, Disp: , Rfl:      lamoTRIgine (LAMICTAL) 25 MG tablet, 25 mg, Disp: , Rfl:      methocarbamol (ROBAXIN) 500 MG tablet, , Disp: , Rfl:      minoxidil (ROGAINE) 2 % external solution, , Disp: , Rfl:      Montelukast Sodium (SINGULAIR PO), Take 10 mg by mouth, Disp: , Rfl:      mupirocin (BACTROBAN) 2 % external ointment, , Disp: , Rfl:      omeprazole (PRILOSEC) 20 MG DR capsule, Take 20 mg by mouth, Disp: , Rfl:     There is no immunization history on file for this patient.    ASSESSMENT AND PLAN:    Hypogammaglobulinemia (H)  Historical labs are not quite consistent with common variable immunodeficiency considering that her immunoglobulin a and immunoglobulin M levels were within normal limits.  I do not think that the work-up was complete.  Also, I wonder if hypogammaglobulinemia is acquired.  The patient states that she was started on lamotrigine 2 years ago.  This is when she started having symptoms.  Lamotrigine is known to be the medicine that can lower immunoglobulin levels.  - I will reorder IgA, IgE, immunoglobulin G, IgM, pneumococcal antibody levels, tetanus antibody level, panel, MBL, CMP, SPEP, and UA.  Depending on the results, may consider postvaccination levels.    - IgA  - IgE  - IgG  - IgM  - Strep pneumo IgG Abys 23 Serotypes  - T cell subset extended profile  - Mannose Binding Lectin  - Tetanus antibody  - CBC with platelets differential  - **Comprehensive metabolic panel FUTURE 1yr  - Protein electrophoresis  - **UA reflex to Microscopic FUTURE 2mo  - Complement Activity Total (CH50)  - H  influenzae B antibody IgG      Chronic vasomotor rhinitis  -Ordered serum IgE for regional aeroallergens panel.  - Continue intranasal fluticasone 1 spray in each nostril twice daily.  -Start azelastine 2 sprays in each nostril twice daily as needed.      - azelastine (ASTELIN) 0.1 % nasal spray  Dispense: 30 mL; Refill: 3  - Allergen cat epithellium IgE  - Allergen dog epithelium IgE  - Allergen Jose Armando grass IgE  - Allergen orchard grass IgE  - Allergen carolyn IgE  - Allergen D farinae IgE  - Allergen D pteronyssinus IgE  - Allergen alternaria alternata IgE  - Allergen aspergillus fumigatus IgE  - Allergen cladosporium herbarum IgE  - Allergen Epicoccum purpurascens IgE  - Allergen penicillium notatum IgE  - Allergen austin white IgE  - Allergen Cedar IgE  - Allergen cottonwood IgE  - Allergen elm IgE  - Allergen maple box elder IgE  - Allergen oak white IgE  - Allergen Red Neely IgE  - Allergen silver  birch IgE  - Allergen Tree White [place]ely IgE  - Allergen [place] Tree  - Allergen white pine IgE  - Allergen English plantain IgE  - Allergen giant ragweed IgE  - Allergen lamb's quarter IgE  - Allergen Mugwort IgE  - Allergen ragweed short IgE  - Allergen Sagebrush Wormwood IgE  - Allergen Sheep Sorrel IgE  - Allergen thistle Russian IgE  - Allergen Weed Nettle IgE  - Allergen, Kochia/Firebush          Follow up in 2 months or sooner if needed.      Telephone started: 8:20 AM   Telephone Ended:  8:54 AM       Phone call duration:34 minutes    This patient was evaluated virtually during the COVID-19 outbreak in attempts to keep healthy patients out of the clinic. I evaluated them by phone and this note reflects our conversation.    Disclaimer: This note consists of symbols derived from keyboarding, dictation and/or voice recognition software. As a result, there may be errors in the script that have gone undetected. Please consider this when interpreting information found in this chart.     Perez Patino MD,  [facility] TRUJILL[facility]  Allergy, Asthma and Immunology     [facility]           Again, thank you for allowing me to participate in the care of your patient.        Sincerely,        Perez Patino MD

## 2020-08-27 NOTE — PATIENT INSTRUCTIONS
-Continue Flonase 1 spray in each nostril twice daily or 2 sprays in each nostril once daily.  - Use azelastine 2 sprays in each nostril twice a day if needed.  -Complete labs that were ordered today.    Talk to you a psychiatrist.  Explained that antiepileptic drugs can cause a decrease in immunoglobulin levels.    Secondary hypogammaglobulinemia has been described with the use of several antiepileptic agents (carbamazepine, evetiracetam phenytoin, oxcarbazepine, valproate, lamotrigine, and zonisamide).  See if other alternatives are possible.

## 2020-08-27 NOTE — NURSING NOTE
Chief Complaint   Patient presents with     RECHECK     Patient reports she has been feeling worse. She notes her breathing and coughing is worse.

## 2020-08-27 NOTE — LETTER
"    8/27/2020         RE: Shadia Mcgrath  [address]  [place] [address]        Dear Colleague,    Thank you for referring your patient, Shadia Mcgrath, to the [facility]. Please see a copy of my visit note below.    Shadia Mcgrath is a 40 year old female who is being evaluated via a billable telephone visit.      The patient has been notified of following:     \"This telephone visit will be conducted via a call between you and your physician/provider. We have found that certain health care needs can be provided without the need for a physical exam.  This service lets us provide the care you need with a short phone conversation.  If a prescription is necessary we can send it directly to your pharmacy.  If lab work is needed we can place an order for that and you can then stop by our lab to have the test done at a later time.    Telephone visits are billed at different rates depending on your insurance coverage. During this emergency period, for some insurers they may be billed the same as an in-person visit.  Please reach out to your insurance provider with any questions.    If during the course of the call the physician/provider feels a telephone visit is not appropriate, you will not be charged for this service.\"    Patient has given verbal consent for Telephone visit?  Yes    What phone number would you like to be contacted at? [phone]    How would you like to obtain your AVS? Mail a copy      Perez Patino MD      This is a follow up telephone visit. A new patient for me. Transfer from Dr. Baca.  Shadia Mcgrath  is a 40-year-old female previously diagnosed with CVID and asthma.  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  The patient states that she has had asthma since she was a child. Has a history of ICU stays.   She has primarily had a " long-term history of productive (yellow) cough, wheezing, and shortness of breath for approximately 1-2 years.    She has a history of multiple cultures being positive for Haemophilus influenza and was on various courses of antibiotics, including Augmentin and Bactrim, according to Pulmonology notes.  Per notes, she does have a history of elevated FeNO levels.  According to the notes, PFTs were consistent with no airflow obstruction but impaired diffusion capacity, DLCO 12.97 (45% of predicted).  In September 2019, chest CT demonstrated tree-in-bud opacities and left lower lobe consolidative changes.    For her chest symptoms she is on Breo 200/25 1 puff once daily, Arnuity 200 mcg 1 puff once daily. She uses albuterol several times daily. Sometimes albuterol is helpful, and sometimes it doesn't.  Her chest triggers: smoke, odors, heat.    She also has perennial nasal congestion, clear, sometimes thick rhinorrhea in her .  She takes cetirizine 10 mg by mouth and Flonase 1 spray in each nostril twice daily.   She was recently treated for a sinus infection.  She reports had a history of sinus surgery in her 20s.Sinus CT performed in December 2019 with signs of chronic maxillary and ethmoid sinusitis and postsurgical changes of functional endoscopic sinus surgery.      In December 2019, CBC was within normal limits without hypereosinophilia.  Negative hypersensitivity panel.  Elevated CRP 9.2 (0-8).  IgE was within normal limits, 14 kiu/L  IgG was 509 (610-1616).  IgG 1 339 (382-929), IgG2 133 (2 ), IgG3 25 (), and IgG4 11 (4-86).  Repeat IgG in January 2020 was 488.    This is when the patient saw Dr. Baca, who used to work as an allergist/immunologist at [facility] [place].  She had lab work ordered by Dr. Baca at an outside lab.  Diphtheria IgG was 0.12, which is considered to be protective.  IgA was 207 (), IgG 431 (767-1590).  She had unprotected Haemophilus influenza IgG, less than 0.15.    Dr. Baca  asked the patient to get Hib vaccine and recommended starting IVIG.    Patient Active Problem List   Diagnosis     CVID (common variable immunodeficiency) (H)       Past Medical History:   Diagnosis Date     Uncomplicated asthma        Past Surgical History:   Procedure Laterality Date     SINUS SURGERY      per patient, she had to drill holes in sinuses     Social History     Socioeconomic History     Marital status: Single     Spouse name: None     Number of children: None     Years of education: None     Highest education level: None   Occupational History     None   Social Needs     Financial resource strain: None     Food insecurity     Worry: None     Inability: None     Transportation needs     Medical: None     Non-medical: None   Tobacco Use     Smoking status: Never Smoker     Smokeless tobacco: Never Used   Substance and Sexual Activity     Alcohol use: Yes     Comment: occassional      Drug use: Yes     Types: Marijuana     Comment: rare     Sexual activity: None   Lifestyle     Physical activity     Days per week: None     Minutes per session: None     Stress: None   Relationships     Social connections     Talks on phone: None     Gets together: None     Attends Christian service: None     Active member of club or organization: None     Attends meetings of clubs or organizations: None     Relationship status: None     Intimate partner violence     Fear of current or ex partner: None     Emotionally abused: None     Physically abused: None     Forced sexual activity: None   Other Topics Concern     Parent/sibling w/ CABG, MI or angioplasty before 65F 55M? Not Asked   Social History Narrative    08/27/20    ENVIRONMENTAL HISTORY: [place] with a gas furnace. They do not have central air conditioning. The patient's bedroom is furnished with hard ryan in bedroom and fabric window coverings.  Pets inside the house include 13 cat(s) (most are  outside) and 1 dog(s). There is history of cockroach or mice infestation (occasionally see rats). There is/are 0 smokers in the house.  The house does have a damp basement.            Review of Systems   Constitutional: Positive for activity change (going to gym). Negative for chills and fever.   HENT: Positive for ear pain, nosebleeds (right side), postnasal drip, rhinorrhea and sore throat. Negative for congestion, dental problem, facial swelling, sinus pressure and sneezing.         Mouth dry    Eyes: Negative for discharge, redness and itching.   Respiratory: Positive for cough, chest tightness and shortness of breath. Negative for wheezing.    Cardiovascular: Positive for chest pain.   Gastrointestinal: Negative for diarrhea, nausea and vomiting.   Musculoskeletal: Positive for myalgias. Negative for arthralgias and joint swelling.   Skin: Negative for rash.   Neurological: Positive for headaches.   Psychiatric/Behavioral: Positive for behavioral problems (ADHD). The patient is nervous/anxious.            Current Outpatient Medications:      azelastine (ASTELIN) 0.1 % nasal spray, Spray 2 sprays into both nostrils 2 times daily as needed for rhinitis, Disp: 30 mL, Rfl: 3     acetaminophen (TYLENOL) 500 MG tablet, Take 500 mg by mouth, Disp: , Rfl:      adapalene (DIFFERIN) 0.1 % external gel, , Disp: , Rfl:      albuterol (PROVENTIL) (2.5 MG/3ML) 0.083% neb solution, Inhale 2.5 mg into the lungs, Disp: , Rfl:      amphetamine-dextroamphetamine (ADDERALL) 20 MG tablet, Take 20 mg by mouth, Disp: , Rfl:      cefpodoxime (VANTIN) 200 MG tablet, Take 1 tablet (200 mg) by mouth 2 times daily, Disp: 42 tablet, Rfl: 0     Cetirizine HCl (ZYRTEC PO), , Disp: , Rfl:      chlorhexidine (PERIDEX) 0.12 % solution, Swish and spit 15 mL two times a day. Start after you finish your oral antibiotic. Swish for 30 seconds then spit, do NOT swallow, Disp: , Rfl:      cholecalciferol ( ULTRA STRENGTH) 50 MCG (2000 UT) CAPS,  Take 2,000 Units by mouth, Disp: , Rfl:      fluticasone (FLONASE) 50 MCG/ACT nasal spray, Spray 1 spray in nostril, Disp: , Rfl:      Fluticasone Furoate (ARNUITY ELLIPTA IN), , Disp: , Rfl:      Fluticasone Furoate-Vilanterol (BREO ELLIPTA IN), , Disp: , Rfl:      gabapentin (NEURONTIN) 300 MG capsule, Take 1,200 mg by mouth, Disp: , Rfl:      ibuprofen (ADVIL/MOTRIN) 800 MG tablet, Take 800 mg by mouth, Disp: , Rfl:      lamoTRIgine (LAMICTAL) 25 MG tablet, 25 mg, Disp: , Rfl:      methocarbamol (ROBAXIN) 500 MG tablet, , Disp: , Rfl:      minoxidil (ROGAINE) 2 % external solution, , Disp: , Rfl:      Montelukast Sodium (SINGULAIR PO), Take 10 mg by mouth, Disp: , Rfl:      mupirocin (BACTROBAN) 2 % external ointment, , Disp: , Rfl:      omeprazole (PRILOSEC) 20 MG DR capsule, Take 20 mg by mouth, Disp: , Rfl:     There is no immunization history on file for this patient.    ASSESSMENT AND PLAN:    Hypogammaglobulinemia (H)  Historical labs are not quite consistent with common variable immunodeficiency considering that her immunoglobulin a and immunoglobulin M levels were within normal limits.  I do not think that the work-up was complete.  Also, I wonder if hypogammaglobulinemia is acquired.  The patient states that she was started on lamotrigine 2 years ago.  This is when she started having symptoms.  Lamotrigine is known to be the medicine that can lower immunoglobulin levels.  - I will reorder IgA, IgE, immunoglobulin G, IgM, pneumococcal antibody levels, tetanus antibody level, panel, MBL, repeat HIB IgG (post vaccinational), CMP, SPEP, and UA.  Depending on the results, may consider postvaccination levels.    - IgA  - IgE  - IgG  - IgM  - Strep pneumo IgG Abys 23 Serotypes  - T cell subset extended profile  - Mannose Binding Lectin  - Tetanus antibody  - CBC with platelets differential  - **Comprehensive metabolic panel FUTURE 1yr  - Protein electrophoresis  - **UA reflex to Microscopic FUTURE 2mo  -  Complement Activity Total (CH50)  - H influenzae B antibody IgG      Chronic vasomotor rhinitis  -Ordered serum IgE for regional aeroallergens panel.  - Continue intranasal fluticasone 1 spray in each nostril twice daily.  -Start azelastine 2 sprays in each nostril twice daily as needed.      - azelastine (ASTELIN) 0.1 % nasal spray  Dispense: 30 mL; Refill: 3  - Allergen cat epithellium IgE  - Allergen dog epithelium IgE  - Allergen Jose Armando grass IgE  - Allergen orchard grass IgE  - Allergen carolyn IgE  - Allergen D farinae IgE  - Allergen D pteronyssinus IgE  - Allergen alternaria alternata IgE  - Allergen aspergillus fumigatus IgE  - Allergen cladosporium herbarum IgE  - Allergen Epicoccum purpurascens IgE  - Allergen penicillium notatum IgE  - Allergen austin white IgE  - Allergen Cedar IgE  - Allergen cottonwood IgE  - Allergen elm IgE  - Allergen maple box elder IgE  - Allergen oak white IgE  - Allergen Red [place] IgE  - Allergen silver  birch IgE  - Allergen Tree [place] IgE  - Allergen [place] Tree  - Allergen white pine IgE  - Allergen English plantain IgE  - Allergen giant ragweed IgE  - Allergen lamb's quarter IgE  - Allergen Mugwort IgE  - Allergen ragweed short IgE  - Allergen Sagebrush Wormwood IgE  - Allergen Sheep Sorrel IgE  - Allergen thistle Russian IgE  - Allergen Weed Nettle IgE  - Allergen, Kochia/Firebush          Follow up in 2 months or sooner if needed.      Telephone started: 8:20 AM   Telephone Ended:  8:54 AM       Phone call duration:34 minutes    This patient was evaluated virtually during the COVID-19 outbreak in attempts to keep healthy patients out of the clinic. I evaluated them by phone and this note reflects our conversation.    Disclaimer: This note consists of symbols derived from keyboarding, dictation and/or voice recognition software. As a result, there may be errors in the script that have gone undetected. Please consider this when interpreting information found in  this chart.     Perez Patino MD, FAAAAI, FACAAI  Allergy, Asthma and Immunology     [facility] [place] [facility]           Again, thank you for allowing me to participate in the care of your patient.        Sincerely,        Perez Patino MD

## 2020-08-27 NOTE — PROGRESS NOTES
"Shadia Mcgrath is a 40 year old female who is being evaluated via a billable telephone visit.      The patient has been notified of following:     \"This telephone visit will be conducted via a call between you and your physician/provider. We have found that certain health care needs can be provided without the need for a physical exam.  This service lets us provide the care you need with a short phone conversation.  If a prescription is necessary we can send it directly to your pharmacy.  If lab work is needed we can place an order for that and you can then stop by our lab to have the test done at a later time.    Telephone visits are billed at different rates depending on your insurance coverage. During this emergency period, for some insurers they may be billed the same as an in-person visit.  Please reach out to your insurance provider with any questions.    If during the course of the call the physician/provider feels a telephone visit is not appropriate, you will not be charged for this service.\"    Patient has given verbal consent for Telephone visit?  Yes    What phone number would you like to be contacted at? [phone]    How would you like to obtain your AVS? Mail a copy      Perez Patino MD      This is a follow up telephone visit. A new patient for me. Transfer from Dr. Baca.  Shadia Mcgrath  is a 40-year-old female previously diagnosed with CVID and asthma.  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  The patient states that she has had asthma since she was a child. Has a history of ICU stays.   She has primarily had a long-term history of productive (yellow) cough, wheezing, and shortness of breath for approximately 1-2 years.    She has a history of multiple cultures being positive for Haemophilus influenza and was on various courses of antibiotics, including " Augmentin and Bactrim, according to Pulmonology notes.  Per notes, she does have a history of elevated FeNO levels.  According to the notes, PFTs were consistent with no airflow obstruction but impaired diffusion capacity, DLCO 12.97 (45% of predicted).  In September 2019, chest CT demonstrated tree-in-bud opacities and left lower lobe consolidative changes.    For her chest symptoms she is on Breo 200/25 1 puff once daily, Arnuity 200 mcg 1 puff once daily. She uses albuterol several times daily. Sometimes albuterol is helpful, and sometimes it doesn't.  Her chest triggers: smoke, odors, heat.    She also has perennial nasal congestion, clear, sometimes thick rhinorrhea in her .  She takes cetirizine 10 mg by mouth and Flonase 1 spray in each nostril twice daily.   She was recently treated for a sinus infection.  She reports had a history of sinus surgery in her 20s.Sinus CT performed in December 2019 with signs of chronic maxillary and ethmoid sinusitis and postsurgical changes of functional endoscopic sinus surgery.      In December 2019, CBC was within normal limits without hypereosinophilia.  Negative hypersensitivity panel.  Elevated CRP 9.2 (0-8).  IgE was within normal limits, 14 kiu/L  IgG was 509 (610-1616).  IgG 1 339 (382-929), IgG2 133 (2 ), IgG3 25 (), and IgG4 11 (4-86).  Repeat IgG in January 2020 was 488.    This is when the patient saw Dr. Baca, who used to work as an allergist/immunologist at [facility] [place].  She had lab work ordered by Dr. Baca at an outside lab.  Diphtheria IgG was 0.12, which is considered to be protective.  IgA was 207 (), IgG 431 (767-1590).  She had unprotected Haemophilus influenza IgG, less than 0.15.    Dr. Baca asked the patient to get Hib vaccine and recommended starting IVIG.    Patient Active Problem List   Diagnosis     CVID (common variable immunodeficiency) (H)       Past Medical History:   Diagnosis Date     Uncomplicated asthma        Past  Surgical History:   Procedure Laterality Date     SINUS SURGERY      per patient, she had to drill holes in sinuses     Social History     Socioeconomic History     Marital status: Single     Spouse name: None     Number of children: None     Years of education: None     Highest education level: None   Occupational History     None   Social Needs     Financial resource strain: None     Food insecurity     Worry: None     Inability: None     Transportation needs     Medical: None     Non-medical: None   Tobacco Use     Smoking status: Never Smoker     Smokeless tobacco: Never Used   Substance and Sexual Activity     Alcohol use: Yes     Comment: occassional      Drug use: Yes     Types: Marijuana     Comment: rare     Sexual activity: None   Lifestyle     Physical activity     Days per week: None     Minutes per session: None     Stress: None   Relationships     Social connections     Talks on phone: None     Gets together: None     Attends Yarsani service: None     Active member of club or organization: None     Attends meetings of clubs or organizations: None     Relationship status: None     Intimate partner violence     Fear of current or ex partner: None     Emotionally abused: None     Physically abused: None     Forced sexual activity: None   Other Topics Concern     Parent/sibling w/ CABG, MI or angioplasty before 65F 55M? Not Asked   Social History Narrative    08/27/20    ENVIRONMENTAL HISTORY: [place] is heated with a gas furnace. They do not have central air conditioning. The patient's bedroom is furnished with hard ryan in bedroom and fabric window coverings.  Pets inside the house include 13 cat(s) (most are outside) and 1 dog(s). There is history of cockroach or mice infestation (occasionally see rats). There is/are 0 smokers in the house.  The house does have a damp basement.            Review of Systems   Constitutional: Positive for activity  change (going to gym). Negative for chills and fever.   HENT: Positive for ear pain, nosebleeds (right side), postnasal drip, rhinorrhea and sore throat. Negative for congestion, dental problem, facial swelling, sinus pressure and sneezing.         Mouth dry    Eyes: Negative for discharge, redness and itching.   Respiratory: Positive for cough, chest tightness and shortness of breath. Negative for wheezing.    Cardiovascular: Positive for chest pain.   Gastrointestinal: Negative for diarrhea, nausea and vomiting.   Musculoskeletal: Positive for myalgias. Negative for arthralgias and joint swelling.   Skin: Negative for rash.   Neurological: Positive for headaches.   Psychiatric/Behavioral: Positive for behavioral problems (ADHD). The patient is nervous/anxious.            Current Outpatient Medications:      azelastine (ASTELIN) 0.1 % nasal spray, Spray 2 sprays into both nostrils 2 times daily as needed for rhinitis, Disp: 30 mL, Rfl: 3     acetaminophen (TYLENOL) 500 MG tablet, Take 500 mg by mouth, Disp: , Rfl:      adapalene (DIFFERIN) 0.1 % external gel, , Disp: , Rfl:      albuterol (PROVENTIL) (2.5 MG/3ML) 0.083% neb solution, Inhale 2.5 mg into the lungs, Disp: , Rfl:      amphetamine-dextroamphetamine (ADDERALL) 20 MG tablet, Take 20 mg by mouth, Disp: , Rfl:      cefpodoxime (VANTIN) 200 MG tablet, Take 1 tablet (200 mg) by mouth 2 times daily, Disp: 42 tablet, Rfl: 0     Cetirizine HCl (ZYRTEC PO), , Disp: , Rfl:      chlorhexidine (PERIDEX) 0.12 % solution, Swish and spit 15 mL two times a day. Start after you finish your oral antibiotic. Swish for 30 seconds then spit, do NOT swallow, Disp: , Rfl:      cholecalciferol ( ULTRA STRENGTH) 50 MCG (2000 UT) CAPS, Take 2,000 Units by mouth, Disp: , Rfl:      fluticasone (FLONASE) 50 MCG/ACT nasal spray, Spray 1 spray in nostril, Disp: , Rfl:      Fluticasone Furoate (ARNUITY ELLIPTA IN), , Disp: , Rfl:      Fluticasone Furoate-Vilanterol (BREO ELLIPTA  IN), , Disp: , Rfl:      gabapentin (NEURONTIN) 300 MG capsule, Take 1,200 mg by mouth, Disp: , Rfl:      ibuprofen (ADVIL/MOTRIN) 800 MG tablet, Take 800 mg by mouth, Disp: , Rfl:      lamoTRIgine (LAMICTAL) 25 MG tablet, 25 mg, Disp: , Rfl:      methocarbamol (ROBAXIN) 500 MG tablet, , Disp: , Rfl:      minoxidil (ROGAINE) 2 % external solution, , Disp: , Rfl:      Montelukast Sodium (SINGULAIR PO), Take 10 mg by mouth, Disp: , Rfl:      mupirocin (BACTROBAN) 2 % external ointment, , Disp: , Rfl:      omeprazole (PRILOSEC) 20 MG DR capsule, Take 20 mg by mouth, Disp: , Rfl:     There is no immunization history on file for this patient.    ASSESSMENT AND PLAN:    Hypogammaglobulinemia (H)  Historical labs are not quite consistent with common variable immunodeficiency considering that immunoglobulin A and immunoglobulin M levels were within normal limits.  I do not think that the work-up was complete.  Also, I wonder if hypogammaglobulinemia is acquired.  The patient states that she was started on lamotrigine 2 years ago.  This is when she started having symptoms.  Lamotrigine is known to be the medicine that can lower immunoglobulin levels.  - I will reorder IgA, IgE, immunoglobulin G, IgM, and add pneumococcal antibody levels, tetanus antibody level, panel, MBL, repeat HIB IgG (post vaccinational), CMP, SPEP, and UA.  Depending on the results, may consider postvaccination levels.    - IgA  - IgE  - IgG  - IgM  - Strep pneumo IgG Abys 23 Serotypes  - T cell subset extended profile  - Mannose Binding Lectin  - Tetanus antibody  - CBC with platelets differential  - **Comprehensive metabolic panel FUTURE 1yr  - Protein electrophoresis  - **UA reflex to Microscopic FUTURE 2mo  - Complement Activity Total (CH50)  - H influenzae B antibody IgG      Chronic vasomotor rhinitis  -Ordered serum IgE for regional aeroallergens panel.  - Continue intranasal fluticasone 1 spray in each nostril twice daily.  -Start azelastine 2  sprays in each nostril twice daily as needed.      - azelastine (ASTELIN) 0.1 % nasal spray  Dispense: 30 mL; Refill: 3  - Allergen cat epithellium IgE  - Allergen dog epithelium IgE  - Allergen Jose Armando grass IgE  - Allergen orchard grass IgE  - Allergen carolyn IgE  - Allergen D farinae IgE  - Allergen D pteronyssinus IgE  - Allergen alternaria alternata IgE  - Allergen aspergillus fumigatus IgE  - Allergen cladosporium herbarum IgE  - Allergen Epicoccum purpurascens IgE  - Allergen penicillium notatum IgE  - Allergen [place] white IgE  - Allergen Cedar IgE  - Allergen cottonwood IgE  - Allergen elm IgE  - Allergen maple box elder IgE  - Allergen oak white IgE  - Allergen Red [place] IgE  - Allergen silver  birch IgE  - Allergen Tree White [place]quin IgE  - Allergen [place] Tree  - Allergen white pine IgE  - Allergen English plantain IgE  - Allergen giant ragweed IgE  - Allergen lamb's quarter IgE  - Allergen Mugwort IgE  - Allergen ragweed short IgE  - Allergen Sagebrush Wormwood IgE  - Allergen Sheep Sorrel IgE  - Allergen thistle Russian IgE  - Allergen Weed Nettle IgE  - Allergen, Kochia/Firebush          Follow up in 2 months or sooner if needed.      Telephone started: 8:20 AM   Telephone Ended:  8:54 AM       Phone call duration:34 minutes    This patient was evaluated virtually during the COVID-19 outbreak in attempts to keep healthy patients out of the clinic. I evaluated them by phone and this note reflects our conversation.    Disclaimer: This note consists of symbols derived from keyboarding, dictation and/or voice recognition software. As a result, there may be errors in the script that have gone undetected. Please consider this when interpreting information found in this chart.     Perez Patino MD, FAAAAI, FACAAI  Allergy, Asthma and Immunology     [facility] [place] [facility] [place]

## 2020-09-01 ENCOUNTER — TELEPHONE (OUTPATIENT)
Dept: ALLERGY | Facility: CLINIC | Age: 40
End: 2020-09-01

## 2020-09-14 ENCOUNTER — TRANSFERRED RECORDS (OUTPATIENT)
Dept: HEALTH INFORMATION MANAGEMENT | Facility: CLINIC | Age: 40
End: 2020-09-14

## 2020-09-21 ENCOUNTER — VIRTUAL VISIT (OUTPATIENT)
Dept: BEHAVIORAL HEALTH | Facility: CLINIC | Age: 40
End: 2020-09-21
Attending: ALLERGY & IMMUNOLOGY

## 2020-09-21 DIAGNOSIS — F32.A DEPRESSION, UNSPECIFIED DEPRESSION TYPE: Primary | ICD-10-CM

## 2020-09-21 ASSESSMENT — COLUMBIA-SUICIDE SEVERITY RATING SCALE - C-SSRS
LETHALITY/MEDICAL DAMAGE CODE FIRST ACTUAL ATTEMPT: MODERATE PHYSICAL DAMAGE, MEDICAL ATTENTION NEEDED
5. HAVE YOU STARTED TO WORK OUT OR WORKED OUT THE DETAILS OF HOW TO KILL YOURSELF? DO YOU INTEND TO CARRY OUT THIS PLAN?: YES
LETHALITY/MEDICAL DAMAGE CODE MOST LETHAL ACTUAL ATTEMPT: MODERATE PHYSICAL DAMAGE, MEDICAL ATTENTION NEEDED
2. HAVE YOU ACTUALLY HAD ANY THOUGHTS OF KILLING YOURSELF LIFETIME?: YES
LETHALITY/MEDICAL DAMAGE CODE MOST RECENT ACTUAL ATTEMPT: MODERATE PHYSICAL DAMAGE, MEDICAL ATTENTION NEEDED
6. HAVE YOU EVER DONE ANYTHING, STARTED TO DO ANYTHING, OR PREPARED TO DO ANYTHING TO END YOUR LIFE?: NO
TOTAL  NUMBER OF ABORTED OR SELF INTERRUPTED ATTEMPTS PAST LIFETIME: NO
5. HAVE YOU STARTED TO WORK OUT OR WORKED OUT THE DETAILS OF HOW TO KILL YOURSELF? DO YOU INTEND TO CARRY OUT THIS PLAN?: NO
1. IN THE PAST MONTH, HAVE YOU WISHED YOU WERE DEAD OR WISHED YOU COULD GO TO SLEEP AND NOT WAKE UP?: YES
4. HAVE YOU HAD THESE THOUGHTS AND HAD SOME INTENTION OF ACTING ON THEM?: YES
TOTAL  NUMBER OF INTERRUPTED ATTEMPTS LIFETIME: NO
3. HAVE YOU BEEN THINKING ABOUT HOW YOU MIGHT KILL YOURSELF?: YES
REASONS FOR IDEATION LIFETIME: MOSTLY TO END OR STOP THE PAIN (YOU COULDN'T GO ON LIVING WITH THE PAIN OR HOW YOU WERE FEELING)
ATTEMPT PAST THREE MONTHS: NO
ATTEMPT LIFETIME: YES
6. HAVE YOU EVER DONE ANYTHING, STARTED TO DO ANYTHING, OR PREPARED TO DO ANYTHING TO END YOUR LIFE?: NO
TOTAL  NUMBER OF ABORTED OR SELF INTERRUPTED ATTEMPTS PAST 3 MONTHS: NO
2. HAVE YOU ACTUALLY HAD ANY THOUGHTS OF KILLING YOURSELF?: NO
4. HAVE YOU HAD THESE THOUGHTS AND HAD SOME INTENTION OF ACTING ON THEM?: NO
TOTAL  NUMBER OF INTERRUPTED ATTEMPTS PAST 3 MONTHS: NO
1. IN THE PAST MONTH, HAVE YOU WISHED YOU WERE DEAD OR WISHED YOU COULD GO TO SLEEP AND NOT WAKE UP?: NO
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
## 2020-09-23 ENCOUNTER — TELEPHONE (OUTPATIENT)
Dept: BEHAVIORAL HEALTH | Facility: CLINIC | Age: 40
End: 2020-09-23

## 2020-09-23 NOTE — TELEPHONE ENCOUNTER
We did not reach Shadia Mcgrath, we left a message with our contact number [phone].  If we do not hear from them within the next 3 business days we will mail a letter offering our scheduling services.    If they do call to schedule, in the future, we will inform you of the outcome.    Thank you for your referral,  [facility]

## 2020-10-05 ENCOUNTER — TELEPHONE (OUTPATIENT)
Dept: ALLERGY | Facility: CLINIC | Age: 40
End: 2020-10-05

## 2020-10-05 ENCOUNTER — NURSE TRIAGE (OUTPATIENT)
Dept: NURSING | Facility: CLINIC | Age: 40
End: 2020-10-05

## 2020-10-05 ENCOUNTER — VIRTUAL VISIT (OUTPATIENT)
Dept: BEHAVIORAL HEALTH | Facility: CLINIC | Age: 40
End: 2020-10-05
Payer: COMMERCIAL

## 2020-10-05 DIAGNOSIS — F33.0 MAJOR DEPRESSIVE DISORDER, RECURRENT EPISODE, MILD (H): Primary | ICD-10-CM

## 2020-10-05 PROCEDURE — 90791 PSYCH DIAGNOSTIC EVALUATION: CPT | Mod: 95 | Performed by: PSYCHOLOGIST
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
## 2020-10-05 NOTE — TELEPHONE ENCOUNTER
I want to see the results ASAP once they are available. No need to wait. She may need to get the vaccines, and get another set of labs.   Waiting to review the results until the next follow up visit, will delay care.  Perez Patino

## 2020-10-05 NOTE — TELEPHONE ENCOUNTER
[facility] Call Center    Phone Message    May a detailed message be left on voicemail: yes     Reason for Call: Other: The pt is asking for a call back from Dr. Patino with her recent results and to discuss what the next step is in her cre. Please call the pt to discuss. Thanks.    Action Taken: Message routed to:  [facility] ([facility]  allergy    Travel Screening: Not Applicable

## 2020-10-06 NOTE — TELEPHONE ENCOUNTER
Urinalysis with 1+ bilirubin.  No protein in the urine noted.  Comprehensive metabolic panel with elevated ALT and AST.  She can discuss that with PCP.  She has normal protein level and albumin level.  It suggest that the patient does not lose proteins through urine and she has normal protein level.  SPEP without monoclonal protein detected.    CBC with differential without hypereosinophilia.  WBC is within normal limits.  T cells are within normal limits.  B cells were not done, not sure why.  IgM is within normal limits 92.9 ().  IgE 11.5 (), IgA is within normal limits, 217 ().  Decreased IgG, 441 ([phone]).  Pneumococcal antibody levels are 2/23 protective.  Protective tetanus antibody level.  Complement CH 50 is within normal limits.    Negative serum IgE for Cladosporium, [place], Epicoccum, ragweed, and penicillium.    -There are multiple labs that I am not able to see the results. Most of them are listed as miscellaneous. Allergy RN, please obtain a hard copy of the test results, if it is possible.  T cells are within normal limits.  B cells were not done, not sure why.  -Please let the patient know that her liver function was not completely normal and she should follow-up about it with PCP.  -She needs to get Pneumovax vaccine and get a repeat pneumococcal antibody levels in 6 weeks after she gets the vaccine.    Lamotrigine is known to be the medicine that can lower immunoglobulin levels. In August, I suggested the patient to discuss that with her therapist to consider an alternative. Did she do that?      Perez Patino MD

## 2020-10-07 NOTE — TELEPHONE ENCOUNTER
Informed patient of information below. She will follow up with primary care provider and have results faxed. Advised her to talk to her therapist regarding her medication, as she had not done so yet.

## 2020-10-09 PROBLEM — F33.0 MAJOR DEPRESSIVE DISORDER, RECURRENT EPISODE, MILD (H): Status: ACTIVE | Noted: 2020-10-09

## 2020-10-09 NOTE — TELEPHONE ENCOUNTER
Dr Patino asked the pt to speak with her therapist about changing meds. But the pt can't remember exactly what to tell the therapist. Please call the pt today to giver her the info as her therapist is calling her today. Thanks.

## 2020-10-09 NOTE — TELEPHONE ENCOUNTER
"\"Lamotrigine is known to be the medicine that can lower immunoglobulin levels. In August, I suggested the patient to discuss that with her therapist to consider an alternative.\"      Spoke with patient regarding above. She states that she has already received this information. She also states that she will have her future labs drawn in the [facility] near her home.   "

## 2020-10-14 ENCOUNTER — TELEPHONE (OUTPATIENT)
Dept: DERMATOLOGY | Facility: CLINIC | Age: 40
End: 2020-10-14

## 2020-10-14 NOTE — TELEPHONE ENCOUNTER
Nurse calling from [facility] in [place], [place] and returning Clau's call. She says that the pt has not had all the tests done yet because they still need to draw up more blood (17 tubes). They stopped at 20 tubes of blood last time due to that being a lot of blood. Pt needs to call to schedule another appointment. You can call back if you have any questions or concerns.  TOVA Mcghee

## 2020-10-19 ENCOUNTER — VIRTUAL VISIT (OUTPATIENT)
Dept: BEHAVIORAL HEALTH | Facility: CLINIC | Age: 40
End: 2020-10-19
Payer: COMMERCIAL

## 2020-10-19 DIAGNOSIS — F33.0 MAJOR DEPRESSIVE DISORDER, RECURRENT EPISODE, MILD (H): Primary | ICD-10-CM

## 2020-10-19 PROCEDURE — 90832 PSYTX W PT 30 MINUTES: CPT | Mod: 95 | Performed by: PSYCHOLOGIST

## 2020-10-19 NOTE — PROGRESS NOTES
[facility] Integrated Behavioral Health  October 19, 2020      Behavioral Health Clinician Progress Note    Patient Name: Shadia Mcgrath           Service Type: Phone Visit      Service Location:  phone visit      Session Start Time: 3:16  Session End Time: 3:35      Session Length: 16 - 37      Attendees: Patient     Visit Activities (Refresh list every visit): Phone Encounter    Diagnostic Assessment Date: 10/5/2020  Treatment Plan Review Date: initial plan 10/5  See Flowsheets for today's PHQ-9 and ZACH-7 results  Previous PHQ-9:   PHQ-9 SCORE 8/27/2020   PHQ-9 Total Score 13     Previous ZACH-7: No flowsheet data found.     MELVIN LEVEL:  No flowsheet data found.    DATA  Extended Session (60+ minutes): No  Interactive Complexity: No  Crisis: No    Treatment Objective(s) Addressed in This Session:  Target Behavior(s): disease management/lifestyle changes related to psychosocial and relational stress    Adjustment Difficulties: will develop coping/problem-solving skills to facilitate more adaptive adjustment    Current Stressors / Issues:  Shadia reports she is doing well, though reports concerns of mamadou COVID-19. She indicates her symptoms have been mild, mainly experiencing a lost of taste/smell and feeling a bit run down. She notes feeling better now, but is quarantining herself until Wednesday, the end of her two weeks. She reports her son also contracted the virus and had different but still mild symptoms. As we began to discuss these, this writer was paged for an emergent [facility] consult and so we had to end the session early. Before the session concluded we scheduled a follow-up appointment.     Supportive counseling was used today. Will plan on developing treatment plan next session.     Progress on Treatment Objective(s) / Homework:  Minimal progress - CONTEMPLATION (Considering change and yet undecided); Intervened by assessing the negative and positive thinking  (ambivalence) about behavior change    Motivational Interviewing    MI Intervention: Expressed Empathy/Understanding, Supported Autonomy, Collaboration, Evocation, Permission to raise concern or advise, Open-ended questions, Reflections: simple and complex, Change talk (evoked) and Reframe     Change Talk Expressed by the Patient: Desire to change    Provider Response to Change Talk: E - Evoked more info from patient about behavior change, A - Affirmed patient's thoughts, decisions, or attempts at behavior change, R - Reflected patient's change talk and S - Summarized patient's change talk statements    Reviewed information to establish with psychological testing    Supportive counseling    Care Plan review completed: No    Medication Review:  No changes to current psychiatric medication(s)    Medication Compliance:  Yes    Changes in Health Issues:   None reported    Chemical Use Review:   Substance Use: Chemical use reviewed, no active concerns identified      Tobacco Use: No current tobacco use.      Assessment: Current Emotional / Mental Status (status of significant symptoms):  Risk status (Self / Other harm or suicidal ideation)  Patient has had a history of suicidal ideation: SI as a youth only and suicide attempts: one prior attempt at age 15; none recent  Patient denies current fears or concerns for personal safety.     Patient denies current or recent suicidal ideation or behaviors.  Patient denies current or recent homicidal ideation or behaviors.  Patient denies current or recent self injurious behavior or ideation.  Patient denies other safety concerns.     A safety and risk management plan has not been developed at this time, however patient was encouraged to call [place] [phone] should there be a change in any of these risk factors.    [place] Suicide Severity Rating Scale (Lifetime/Recent)  [place] Suicide Severity Rating (Lifetime/Recent) 9/21/2020   1. Wish to be Dead (Lifetime) Yes   1. Wish  to be Dead (Recent) No   2. Non-Specific Active Suicidal Thoughts (Lifetime) Yes   2. Non-Specific Active Suicidal Thoughts (Recent) No   3. Active Suicidal Ideation with any Methods (Not Plan) Without Intent to Act (Lifetime) Yes   3. Active Sucidal Ideation with any Methods (Not Plan) Without Intent to Act (Recent) No   4. Active Suicidal Ideation with Some Intent to Act, Without Specific Plan (Lifetime) Yes   4. Active Suicidal Ideation with Some Intent to Act, Without Specific Plan (Recent) No   5. Active Suicidal Ideation with Specific Plan and Intent (Lifetime) Yes   5. Active Suicidal Ideation with Specific Plan and Intent (Recent) No   Most Severe Ideation Rating (Lifetime) 4   Frequency (Lifetime) 4   Duration (Lifetime) 4   Controllability (Lifetime) 3   Protective Factors  (Lifetime) 2   Reasons for Ideation (Lifetime) 4   Most Severe Ideation Rating (Past Month) NA   Frequency (Past Month) NA   Duration (Past Month) NA   Controllability (Past Month) NA   Protective Factors (Past Month) NA   Reasons for Ideation (Past Month) NA   Actual Attempt (Lifetime) Yes   Actual Attempt (Past 3 Months) No   Has subject engaged in non-suicidal self-injurious behavior? (Lifetime) No   Has subject engaged in non-suicidal self-injurious behavior? (Past 3 Months) No   Interrupted Attempts (Lifetime) No   Interrupted Attempts (Past 3 Months) No   Aborted or Self-Interrupted Attempt (Lifetime) No   Aborted or Self-Interrupted Attempt (Past 3 Months) No   Preparatory Acts or Behavior (Lifetime) No   Preparatory Acts or Behavior (Past 3 Months) No   Most Recent Attempt Actual Lethality Code 2   Most Lethal Attempt Actual Lethality Code 2   Initial/First Attempt Actual Lethality Code 2       Appearance:   Unable to assess   Eye Contact:   Unable to assess   Psychomotor Behavior: Normal  Unable to assess   Attitude:   Cooperative  Friendly Pleasant  Orientation:   All  Speech   Rate / Production: Normal    Volume:  Normal    Mood:    Normal  Affect:    Appropriate   Thought Content:  Clear   Thought Form:  Coherent  Logical   Insight:    Good     Diagnoses:  1. Major depressive disorder, recurrent episode, mild (H)        Collateral Reports Completed:   Not Applicable    Plan: (Homework, other):  Patient was given information about behavioral services and encouraged to schedule a follow up appointment with the clinic C in 2 weeks. CD Recommendations: No indications of CD issues. Edmundo Hansen, LP

## 2020-10-19 NOTE — TELEPHONE ENCOUNTER
I do not know why so many tubes are required, unless she needs the blood test from other providers as well.  I think it would be so much easier to perform all the tests at [facility].    Perez Patino MD

## 2020-10-26 NOTE — TELEPHONE ENCOUNTER
Discussed with patient and she will have labs drawn at [facility]. Patient received pneumovax vaccine through her primary care provider.

## 2020-10-29 ENCOUNTER — TELEPHONE (OUTPATIENT)
Dept: ALLERGY | Facility: CLINIC | Age: 40
End: 2020-10-29

## 2020-10-29 ENCOUNTER — VIRTUAL VISIT (OUTPATIENT)
Dept: ALLERGY | Facility: CLINIC | Age: 40
End: 2020-10-29
Payer: COMMERCIAL

## 2020-10-29 VITALS
WEIGHT: 240.3 LBS | DIASTOLIC BLOOD PRESSURE: 59 MMHG | BODY MASS INDEX: 37.64 KG/M2 | HEART RATE: 63 BPM | SYSTOLIC BLOOD PRESSURE: 123 MMHG

## 2020-10-29 DIAGNOSIS — D80.1 HYPOGAMMAGLOBULINEMIA (H): Primary | ICD-10-CM

## 2020-10-29 DIAGNOSIS — R05.9 COUGH: ICD-10-CM

## 2020-10-29 PROCEDURE — 99214 OFFICE O/P EST MOD 30 MIN: CPT | Mod: 95 | Performed by: ALLERGY & IMMUNOLOGY

## 2020-10-29 RX ORDER — AZITHROMYCIN 250 MG/1
TABLET, FILM COATED ORAL
Qty: 6 TABLET | Refills: 0 | Status: SHIPPED | OUTPATIENT
Start: 2020-10-29 | End: 2021-01-25

## 2020-10-29 RX ORDER — AZITHROMYCIN 250 MG/1
TABLET, FILM COATED ORAL
Qty: 6 TABLET | Refills: 0 | Status: SHIPPED | OUTPATIENT
Start: 2020-10-29 | End: 2020-11-03

## 2020-10-29 ASSESSMENT — ENCOUNTER SYMPTOMS
EYE PAIN: 0
CHILLS: 0
FEVER: 0
BACK PAIN: 1
COLOR CHANGE: 0
SORE THROAT: 0
VOMITING: 0
SHORTNESS OF BREATH: 1
ABDOMINAL PAIN: 0
DYSURIA: 0
PALPITATIONS: 0
ARTHRALGIAS: 0
SEIZURES: 0
HEMATURIA: 0
COUGH: 1

## 2020-10-29 ASSESSMENT — PAIN SCALES - GENERAL: PAINLEVEL: NO PAIN (0)

## 2020-10-29 NOTE — NURSING NOTE
Allergy Rooming Note    Shadia Mcgrath's goals for this visit include:   Chief Complaint   Patient presents with     RECHECK     Shadia is here for a follow up relating to a plan of action.      Antonella Morataya LPN

## 2020-10-29 NOTE — TELEPHONE ENCOUNTER
[facility] Call Center    Phone Message    May a detailed message be left on voicemail: yes     Reason for Call: Medication Question or concern regarding medication   Prescription Clarification  Name of Medication: azithromycin (ZITHROMAX) 250 MG tablet  Prescribing Provider: Dr. Patino   Pharmacy: [facility]   What on the order needs clarification? Pharm called and said that they received two of this medication with two different directions. They need some clarification on this. Please call them back. Thanks          Action Taken: Message routed to:  [facility] Allergy    Travel Screening: Not Applicable

## 2020-10-29 NOTE — PATIENT INSTRUCTIONS
Talk to psychiatry about Lamictal.  Start azithromycin for 5 days, and then take one pill on Mondays, Wednesdays, and Fridays.   Get the record of your pneumonia shot faxed to us.   Get the blood test in mid November.

## 2020-10-29 NOTE — LETTER
"    10/29/2020         RE: Shadia Mcgrath  [address]  [place] [address]        Dear Colleague,    Thank you for referring your patient, Shadia Mcgrath, to the [facility] [place]. Please see a copy of my visit note below.    Shadia Mcgrath is a 40 year old female who is being evaluated via a billable telephone visit.      The patient has been notified of following:     \"This telephone visit will be conducted via a call between you and your physician/provider. We have found that certain health care needs can be provided without the need for a physical exam.  This service lets us provide the care you need with a short phone conversation.  If a prescription is necessary we can send it directly to your pharmacy.  If lab work is needed we can place an order for that and you can then stop by our lab to have the test done at a later time.    Telephone visits are billed at different rates depending on your insurance coverage. During this emergency period, for some insurers they may be billed the same as an in-person visit.  Please reach out to your insurance provider with any questions.    If during the course of the call the physician/provider feels a telephone visit is not appropriate, you will not be charged for this service.\"    Patient has given verbal consent for Telephone visit?  Yes    What phone number would you like to be contacted at? [phone]    How would you like to obtain your AVS? Mail a copy      Antonella Morataya LPN        This is a follow up telephone visit. Shadia Mcgrath is a 40 year old female with hypogammaglobulinemia that is currently being worked up.      Still has yellow productive cough. Uses Breo and Pulmicort. Uses albuterol several times a day.    She has had a chronic productive cough, wheezing, shortness of breath for the last 2 years.  Multiple cultures positive for Haemophilus influenza and was on various courses of " antibiotics, including Augmentin, and Bactrim according to pulmonology note.  She has a history of elevated FeNO.  According to the notes, PFTs were consistent with no airflow obstruction but impaired diffusion capacity, DLCO 12.97 (45% of predicted).  In September 2019, chest CT demonstrated tree-in-bud opacities and left lower lobe consolidative changes.  Hypogammaglobulinemia was noted.    Since we spoke last time, the patient had the labs done in the local laboratory.  Unfortunately, the lab was not able to get everything done.  So far, the following work-up ordered by me was completed:    Urinalysis with 1+ bilirubin.  No protein in the urine noted.  Comprehensive metabolic panel with elevated ALT and AST.  She can discuss that with PCP.  She has a normal protein level and albumin level.  It suggests that the patient does not lose proteins through urine, and she has a normal protein level.  SPEP without monoclonal protein spike.     CBC with differential without hypereosinophilia.  WBC is within normal limits.  T cells are within normal limits.  B cells were not done, not sure why.  IgM is within normal limits 92.9 ().  IgE 11.5 (), IgA is within normal limits, 217 ().  Decreased IgG, 441 ([phone]).  Pneumococcal antibody levels are 2/23 protective.  Protective tetanus antibody level.  Complement CH 50 is within normal limits.     Negative serum IgE for Cladosporium, Blair, Epicoccum, ragweed, and penicillium.    She has not discussed with psychiatry switching antiepileptic agents since lamotrigine has been described to use secondary hypogammaglobulinemia.  She is planning to do that, but has no appointment set up yet.  She states that she got Pneumovax at the end of September-beginning of October, but does not remember when.    She has been recovering from COVID-19 infection.  She states that the infection actually made her feel better.    She continues having similar chest symptoms as  before, mainly productive cough and shortness of breath.  She uses Breo Ellipta 200/25 mcg 1 puff once daily, Pulmicort Flexhaler 100 mcg 1 puff twice daily, and albuterol several times a day.    Patient Active Problem List   Diagnosis     CVID (common variable immunodeficiency) (H)     Major depressive disorder, recurrent episode, mild (H)       Past Medical History:   Diagnosis Date     Uncomplicated asthma       Problem (# of Occurrences) Relation (Name,Age of Onset)    No Known Problems (4) Mother, Father, Sister, Brother        Past Surgical History:   Procedure Laterality Date     SINUS SURGERY      per patient, she had to drill holes in sinuses     Social History     Socioeconomic History     Marital status: Single     Spouse name: None     Number of children: None     Years of education: None     Highest education level: None   Occupational History     None   Social Needs     Financial resource strain: None     Food insecurity     Worry: None     Inability: None     Transportation needs     Medical: None     Non-medical: None   Tobacco Use     Smoking status: Never Smoker     Smokeless tobacco: Never Used   Substance and Sexual Activity     Alcohol use: Yes     Comment: occassional      Drug use: Yes     Types: Marijuana     Comment: rare     Sexual activity: None   Lifestyle     Physical activity     Days per week: None     Minutes per session: None     Stress: None   Relationships     Social connections     Talks on phone: None     Gets together: None     Attends [employer] service: None     Active member of club or organization: None     Attends meetings of clubs or organizations: None     Relationship status: None     Intimate partner violence     Fear of current or ex partner: None     Emotionally abused: None     Physically abused: None     Forced sexual activity: None   Other Topics Concern     Parent/sibling w/ CABG, MI or angioplasty before 65F 55M? Not Asked   Social History Narrative    10/29/20         ENVIRONMENTAL HISTORY: [place] The home is heated with a forced air. They does have central air conditioning. The patient's bedroom is furnished with hard ryan in bedroom.  Pets inside the house include 3 cat(s). There is no history of cockroach or mice infestation. There is/are 0 smokers in the house.  The house does not have a damp basement.            Review of Systems   Constitutional: Negative for chills and fever.   HENT: Negative for ear pain and sore throat.    Eyes: Negative for pain and visual disturbance.   Respiratory: Positive for cough and shortness of breath.    Cardiovascular: Negative for chest pain and palpitations.   Gastrointestinal: Negative for abdominal pain and vomiting.   Genitourinary: Negative for dysuria and hematuria.   Musculoskeletal: Positive for back pain. Negative for arthralgias.   Skin: Negative for color change and rash.   Neurological: Negative for seizures and syncope.   All other systems reviewed and are negative.          Current Outpatient Medications:      acetaminophen (TYLENOL) 500 MG tablet, Take 500 mg by mouth, Disp: , Rfl:      albuterol (PROVENTIL) (2.5 MG/3ML) 0.083% neb solution, Inhale 2.5 mg into the lungs, Disp: , Rfl:      amphetamine-dextroamphetamine (ADDERALL) 20 MG tablet, Take 20 mg by mouth, Disp: , Rfl:      azelastine (ASTELIN) 0.1 % nasal spray, Spray 2 sprays into both nostrils 2 times daily as needed for rhinitis, Disp: 30 mL, Rfl: 3     azithromycin (ZITHROMAX) 250 MG tablet, Take 2 tablets (500 mg) by mouth daily for 1 day, THEN 1 tablet (250 mg) daily for 4 days., Disp: 6 tablet, Rfl: 0     azithromycin (ZITHROMAX) 250 MG tablet, Take one tablet on Mondays, Wednesdays, and Fridays., Disp: 6 tablet, Rfl: 0     budesonide (PULMICORT FLEXHALER) 180 MCG/ACT inhaler, Inhale into the lungs 2 times daily, Disp:  , Rfl:      Cetirizine HCl (ZYRTEC PO), , Disp: , Rfl:      cholecalciferol ( ULTRA  STRENGTH) 50 MCG (2000 UT) CAPS, Take 2,000 Units by mouth, Disp: , Rfl:      fluticasone (FLONASE) 50 MCG/ACT nasal spray, Spray 1 spray in nostril, Disp: , Rfl:      Fluticasone Furoate-Vilanterol (BREO ELLIPTA IN), , Disp: , Rfl:      gabapentin (NEURONTIN) 300 MG capsule, Take 1,200 mg by mouth, Disp: , Rfl:      ibuprofen (ADVIL/MOTRIN) 800 MG tablet, Take 800 mg by mouth, Disp: , Rfl:      lamoTRIgine (LAMICTAL) 25 MG tablet, 25 mg, Disp: , Rfl:      methocarbamol (ROBAXIN) 500 MG tablet, , Disp: , Rfl:      Montelukast Sodium (SINGULAIR PO), Take 10 mg by mouth, Disp: , Rfl:      omeprazole (PRILOSEC) 20 MG DR capsule, Take 20 mg by mouth, Disp: , Rfl:      adapalene (DIFFERIN) 0.1 % external gel, , Disp: , Rfl:      cefpodoxime (VANTIN) 200 MG tablet, Take 1 tablet (200 mg) by mouth 2 times daily (Patient not taking: Reported on 10/29/2020), Disp: 42 tablet, Rfl: 0     chlorhexidine (PERIDEX) 0.12 % solution, Swish and spit 15 mL two times a day. Start after you finish your oral antibiotic. Swish for 30 seconds then spit, do NOT swallow, Disp: , Rfl:      Fluticasone Furoate (ARNUITY ELLIPTA IN), , Disp: , Rfl:      minoxidil (ROGAINE) 2 % external solution, , Disp: , Rfl:      mupirocin (BACTROBAN) 2 % external ointment, , Disp: , Rfl:     There is no immunization history on file for this patient.    ASSESSMENT AND PLAN:    Hypogammaglobulinemia (H)  Cough    -For her current cough, I will order azithromycin regular course, and then transition to 1 pill on Mondays, Wednesdays, and Fridays.  -Continue Breo, Pulmicort, and albuterol as is.  -I will reorder T and B cell panel, MBL, pneumococcal antibody levels, HIB antibody level, IgG, and serum IgE for regional aeroallergen panel.  She can have it done in mid/and of November, since pneumococcal antibody levels will be assessed as postvaccination. I asked the patient to provide records of immunization.  -Suggest discussing with lamotrigine prescribing  physician an alternative medication. Secondary hypogammaglobulinemia has been described with the use of several antiepileptic agents (carbamazepine, evetiracetam phenytoin, oxcarbazepine, valproate, lamotrigine, and zonisamide).    - azithromycin (ZITHROMAX) 250 MG tablet  Dispense: 6 tablet; Refill: 0  - azithromycin (ZITHROMAX) 250 MG tablet  Dispense: 6 tablet; Refill: 0           Follow up approximately 2 months once the work-up is complete.      Phone call duration: 18 minutes    This patient was evaluated virtually during the COVID-19 outbreak in attempts to keep healthy patients out of the clinic. I evaluated them by phone and this note reflects our conversation.    Disclaimer: This note consists of symbols derived from keyboarding, dictation and/or voice recognition software. As a result, there may be errors in the script that have gone undetected. Please consider this when interpreting information found in this chart.     Perez Patino MD, FAASANTOS, FRITZ  Allergy, Asthma and Immunology     [facility]             Again, thank you for allowing me to participate in the care of your patient.        Sincerely,        Perez Patino MD

## 2020-10-29 NOTE — PROGRESS NOTES
"Shadia Mcgrath is a 40 year old female who is being evaluated via a billable telephone visit.      The patient has been notified of following:     \"This telephone visit will be conducted via a call between you and your physician/provider. We have found that certain health care needs can be provided without the need for a physical exam.  This service lets us provide the care you need with a short phone conversation.  If a prescription is necessary we can send it directly to your pharmacy.  If lab work is needed we can place an order for that and you can then stop by our lab to have the test done at a later time.    Telephone visits are billed at different rates depending on your insurance coverage. During this emergency period, for some insurers they may be billed the same as an in-person visit.  Please reach out to your insurance provider with any questions.    If during the course of the call the physician/provider feels a telephone visit is not appropriate, you will not be charged for this service.\"    Patient has given verbal consent for Telephone visit?  Yes    What phone number would you like to be contacted at? [phone]    How would you like to obtain your AVS? Mail a copy      Antonella Morataya LPN        This is a follow up telephone visit. Shadia Mcgrath is a 40 year old female with hypogammaglobulinemia that is currently being worked up.      Still has yellow productive cough. Uses Breo and Pulmicort. Uses albuterol several times a day.    She has had a chronic productive cough, wheezing, shortness of breath for the last 2 years.  Multiple cultures positive for Haemophilus influenza and was on various courses of antibiotics, including Augmentin, and Bactrim according to pulmonology note.  She has a history of elevated FeNO.  According to the notes, PFTs were consistent with no airflow obstruction but impaired diffusion capacity, DLCO 12.97 (45% of predicted).  In September 2019, chest CT " demonstrated tree-in-bud opacities and left lower lobe consolidative changes.  Hypogammaglobulinemia was noted.    Since we spoke last time, the patient had the labs done in the local laboratory.  Unfortunately, the lab was not able to get everything done.  So far, the following work-up ordered by me was completed:    Urinalysis with 1+ bilirubin.  No protein in the urine noted.  Comprehensive metabolic panel with elevated ALT and AST.  She can discuss that with PCP.  She has a normal protein level and albumin level.  It suggests that the patient does not lose proteins through urine, and she has a normal protein level.  SPEP without monoclonal protein spike.     CBC with differential without hypereosinophilia.  WBC is within normal limits.  T cells are within normal limits.  B cells were not done, not sure why.  IgM is within normal limits 92.9 ().  IgE 11.5 (), IgA is within normal limits, 217 ().  Decreased IgG, 441 ([phone]).  Pneumococcal antibody levels are 2/23 protective.  Protective tetanus antibody level.  Complement CH 50 is within normal limits.     Negative serum IgE for Cladosporium, Allen, Epicoccum, ragweed, and penicillium.    She has not discussed with psychiatry switching antiepileptic agents since lamotrigine has been described to use secondary hypogammaglobulinemia.  She is planning to do that, but has no appointment set up yet.  She states that she got Pneumovax at the end of September-beginning of October, but does not remember when.    She has been recovering from COVID-19 infection.  She states that the infection actually made her feel better.    She continues having similar chest symptoms as before, mainly productive cough and shortness of breath.  She uses Breo Ellipta 200/25 mcg 1 puff once daily, Pulmicort Flexhaler 100 mcg 1 puff twice daily, and albuterol several times a day.    Patient Active Problem List   Diagnosis     CVID (common variable immunodeficiency)  (H)     Major depressive disorder, recurrent episode, mild (H)       Past Medical History:   Diagnosis Date     Uncomplicated asthma       Problem (# of Occurrences) Relation (Name,Age of Onset)    No Known Problems (4) Mother, Father, Sister, Brother        Past Surgical History:   Procedure Laterality Date     SINUS SURGERY      per patient, she had to drill holes in sinuses     Social History     Socioeconomic History     Marital status: Single     Spouse name: None     Number of children: None     Years of education: None     Highest education level: None   Occupational History     None   Social Needs     Financial resource strain: None     Food insecurity     Worry: None     Inability: None     Transportation needs     Medical: None     Non-medical: None   Tobacco Use     Smoking status: Never Smoker     Smokeless tobacco: Never Used   Substance and Sexual Activity     Alcohol use: Yes     Comment: occassional      Drug use: Yes     Types: Marijuana     Comment: rare     Sexual activity: None   Lifestyle     Physical activity     Days per week: None     Minutes per session: None     Stress: None   Relationships     Social connections     Talks on phone: None     Gets together: None     Attends Congregation service: None     Active member of club or organization: None     Attends meetings of clubs or organizations: None     Relationship status: None     Intimate partner violence     Fear of current or ex partner: None     Emotionally abused: None     Physically abused: None     Forced sexual activity: None   Other Topics Concern     Parent/sibling w/ CABG, MI or angioplasty before 65F 55M? Not Asked   Social History Narrative    10/29/20        ENVIRONMENTAL HISTORY: [place] The home is heated with a forced air. They does have central air conditioning. The patient's bedroom is furnished with hard ryan in bedroom.  Pets inside the house include 3 cat(s). There is  no history of cockroach or mice infestation. There is/are 0 smokers in the house.  The house does not have a damp basement.            Review of Systems   Constitutional: Negative for chills and fever.   HENT: Negative for ear pain and sore throat.    Eyes: Negative for pain and visual disturbance.   Respiratory: Positive for cough and shortness of breath.    Cardiovascular: Negative for chest pain and palpitations.   Gastrointestinal: Negative for abdominal pain and vomiting.   Genitourinary: Negative for dysuria and hematuria.   Musculoskeletal: Positive for back pain. Negative for arthralgias.   Skin: Negative for color change and rash.   Neurological: Negative for seizures and syncope.   All other systems reviewed and are negative.          Current Outpatient Medications:      acetaminophen (TYLENOL) 500 MG tablet, Take 500 mg by mouth, Disp: , Rfl:      albuterol (PROVENTIL) (2.5 MG/3ML) 0.083% neb solution, Inhale 2.5 mg into the lungs, Disp: , Rfl:      amphetamine-dextroamphetamine (ADDERALL) 20 MG tablet, Take 20 mg by mouth, Disp: , Rfl:      azelastine (ASTELIN) 0.1 % nasal spray, Spray 2 sprays into both nostrils 2 times daily as needed for rhinitis, Disp: 30 mL, Rfl: 3     azithromycin (ZITHROMAX) 250 MG tablet, Take 2 tablets (500 mg) by mouth daily for 1 day, THEN 1 tablet (250 mg) daily for 4 days., Disp: 6 tablet, Rfl: 0     azithromycin (ZITHROMAX) 250 MG tablet, Take one tablet on Mondays, Wednesdays, and Fridays., Disp: 6 tablet, Rfl: 0     budesonide (PULMICORT FLEXHALER) 180 MCG/ACT inhaler, Inhale into the lungs 2 times daily, Disp:  , Rfl:      Cetirizine HCl (ZYRTEC PO), , Disp: , Rfl:      cholecalciferol ( ULTRA STRENGTH) 50 MCG (2000 UT) CAPS, Take 2,000 Units by mouth, Disp: , Rfl:      fluticasone (FLONASE) 50 MCG/ACT nasal spray, Spray 1 spray in nostril, Disp: , Rfl:      Fluticasone Furoate-Vilanterol (BREO ELLIPTA IN), , Disp: , Rfl:      gabapentin (NEURONTIN) 300 MG capsule,  Take 1,200 mg by mouth, Disp: , Rfl:      ibuprofen (ADVIL/MOTRIN) 800 MG tablet, Take 800 mg by mouth, Disp: , Rfl:      lamoTRIgine (LAMICTAL) 25 MG tablet, 25 mg, Disp: , Rfl:      methocarbamol (ROBAXIN) 500 MG tablet, , Disp: , Rfl:      Montelukast Sodium (SINGULAIR PO), Take 10 mg by mouth, Disp: , Rfl:      omeprazole (PRILOSEC) 20 MG DR capsule, Take 20 mg by mouth, Disp: , Rfl:      adapalene (DIFFERIN) 0.1 % external gel, , Disp: , Rfl:      cefpodoxime (VANTIN) 200 MG tablet, Take 1 tablet (200 mg) by mouth 2 times daily (Patient not taking: Reported on 10/29/2020), Disp: 42 tablet, Rfl: 0     chlorhexidine (PERIDEX) 0.12 % solution, Swish and spit 15 mL two times a day. Start after you finish your oral antibiotic. Swish for 30 seconds then spit, do NOT swallow, Disp: , Rfl:      Fluticasone Furoate (ARNUITY ELLIPTA IN), , Disp: , Rfl:      minoxidil (ROGAINE) 2 % external solution, , Disp: , Rfl:      mupirocin (BACTROBAN) 2 % external ointment, , Disp: , Rfl:     There is no immunization history on file for this patient.    ASSESSMENT AND PLAN:    Hypogammaglobulinemia (H)  Cough    -For her current cough, I will order azithromycin regular course, and then transition to 1 pill on Mondays, Wednesdays, and Fridays.  -Continue Breo, Pulmicort, and albuterol as is.  -I will reorder T and B cell panel, MBL, pneumococcal antibody levels, HIB antibody level, IgG, and serum IgE for regional aeroallergen panel.  She can have it done in mid/and of November, since pneumococcal antibody levels will be assessed as postvaccination. I asked the patient to provide records of immunization.  -Suggest discussing with lamotrigine prescribing physician an alternative medication. Secondary hypogammaglobulinemia has been described with the use of several antiepileptic agents (carbamazepine, evetiracetam phenytoin, oxcarbazepine, valproate, lamotrigine, and zonisamide).    - azithromycin (ZITHROMAX) 250 MG tablet  Dispense: 6  tablet; Refill: 0  - azithromycin (ZITHROMAX) 250 MG tablet  Dispense: 6 tablet; Refill: 0           Follow up approximately 2 months once the work-up is complete.      Phone call duration: 18 minutes    This patient was evaluated virtually during the COVID-19 outbreak in attempts to keep healthy patients out of the clinic. I evaluated them by phone and this note reflects our conversation.    Disclaimer: This note consists of symbols derived from keyboarding, dictation and/or voice recognition software. As a result, there may be errors in the script that have gone undetected. Please consider this when interpreting information found in this chart.     Perez Patino MD, FAAAAI, FACAAI  Allergy, Asthma and Immunology     [facility] [place]

## 2020-11-03 ENCOUNTER — VIRTUAL VISIT (OUTPATIENT)
Dept: BEHAVIORAL HEALTH | Facility: CLINIC | Age: 40
End: 2020-11-03
Payer: COMMERCIAL

## 2020-11-03 DIAGNOSIS — F33.0 MAJOR DEPRESSIVE DISORDER, RECURRENT EPISODE, MILD (H): Primary | ICD-10-CM

## 2020-11-03 PROCEDURE — 90834 PSYTX W PT 45 MINUTES: CPT | Mod: 95 | Performed by: PSYCHOLOGIST

## 2020-11-03 NOTE — PROGRESS NOTES
"[facility]: Integrated Behavioral Health  November 3, 2020      Behavioral Health Clinician Progress Note    Patient Name: Shadia Mcgrath           Service Type: Phone Visit      Service Location:  phone visit      Session Start Time: 3:00  Session End Time: 3:50      Session Length: 38 - 52      Attendees: Patient     Visit Activities (Refresh list every visit): Phone Encounter    Shadia Mcgrath is a 40 year old female who is being evaluated via a telephone visit.      The patient has been notified of the following:     \"We have found that certain health care needs can be provided without the need for a face to face visit.  This service lets us provide the care you need with a short phone conversation.      I will have full access to your [facility] medical record during this entire phone call.   I will be taking notes for your medical record.     Since this is like an office visit, we will bill your insurance company for this service.  Please check with your medical insurance if this type of telephone visit/virtual care is covered.  You may be responsible for the cost of this service if insurance coverage is denied.      There are potential benefits and risks of telephone visits (e.g. limits to patient confidentiality) that differ from in-person visits.?  Confidentiality still applies for telephone services, and nobody will record the visit.  It is important to be in a quiet, private space that is free of distractions (including cell phone or other devices) during the visit.??     If during the course of the call I believe a telephone visit is not appropriate, you will not be charged for this service\"    Consent has been obtained for this service by care team member: yes.    Diagnostic Assessment Date: 10/5/2020  Treatment Plan Review Date: initial plan 10/5  See Flowsheets for today's PHQ-9 and ZACH-7 results  Previous PHQ-9:   PHQ-9 SCORE 8/27/2020   PHQ-9 Total Score " 13     Previous ZACH-7: No flowsheet data found.     MELVIN LEVEL:  No flowsheet data found.    DATA  Extended Session (60+ minutes): No  Interactive Complexity: No  Crisis: No    Treatment Objective(s) Addressed in This Session:  Target Behavior(s): disease management/lifestyle changes related to psychosocial and relational stress    Grief / Loss: will increase understanding of normal grieving process, will engage in effective approach to address and resolve grief/loss issues and will process grief/loss issues in an adaptive manner    Current Stressors / Issues:  Today Shadia reports she is struggling emotionally. She states that last Saturday, a close friend of hers  due to presumed medical complications. She reports finding out about his death from a close friend who found out about it over Facebook. Shadia states that while they were off-and-on in their relationship, he was very important to her and she is grappling with his passing. Shadia shared information about the history behind their relationship and I provided supportive and grief-oriented counseling. Shadia does report she is torn about whether or not to attend his , out of fear that her anticipated emotional reaction would draw undue attention. I helped Shadia process and discuss her ambivalence about going. I reflected to her that tears/being upset at a  is probably socially expected/appropriate, to which Shadia agreed. We also explored what her  partner would have wanted in this circumstance and Shadia firmly noted that he would want her there. We talked some about finding a supportive friend to attend with and she said this would be feasible for her. Shadia expressed thanks for helping her decide to go, as she wanted to be there, but was ambivalent before our discussion.     Progress on Treatment Objective(s) / Homework:  Minimal progress - CONTEMPLATION (Considering change and yet undecided); Intervened by assessing the  negative and positive thinking (ambivalence) about behavior change      Supportive counseling and grief counseling    Care Plan review completed: No    Medication Review:  No changes to current psychiatric medication(s)    Medication Compliance:  Yes    Changes in Health Issues:   None reported    Chemical Use Review:   Substance Use: Chemical use reviewed, no active concerns identified      Tobacco Use: No current tobacco use.      Assessment: Current Emotional / Mental Status (status of significant symptoms):  Risk status (Self / Other harm or suicidal ideation)  Patient has had a history of suicidal ideation: SI as a youth only and suicide attempts: one prior attempt at age 15; none recent  Patient denies current fears or concerns for personal safety.     Patient denies current or recent suicidal ideation or behaviors.  Patient denies current or recent homicidal ideation or behaviors.  Patient denies current or recent self injurious behavior or ideation.  Patient denies other safety concerns.     A safety and risk management plan has not been developed at this time, however patient was encouraged to call [place] [phone] should there be a change in any of these risk factors.    [place] Suicide Severity Rating Scale (Lifetime/Recent)  [place] Suicide Severity Rating (Lifetime/Recent) 9/21/2020   1. Wish to be Dead (Lifetime) Yes   1. Wish to be Dead (Recent) No   2. Non-Specific Active Suicidal Thoughts (Lifetime) Yes   2. Non-Specific Active Suicidal Thoughts (Recent) No   3. Active Suicidal Ideation with any Methods (Not Plan) Without Intent to Act (Lifetime) Yes   3. Active Sucidal Ideation with any Methods (Not Plan) Without Intent to Act (Recent) No   4. Active Suicidal Ideation with Some Intent to Act, Without Specific Plan (Lifetime) Yes   4. Active Suicidal Ideation with Some Intent to Act, Without Specific Plan (Recent) No   5. Active Suicidal Ideation with Specific Plan and Intent (Lifetime) Yes    5. Active Suicidal Ideation with Specific Plan and Intent (Recent) No   Most Severe Ideation Rating (Lifetime) 4   Frequency (Lifetime) 4   Duration (Lifetime) 4   Controllability (Lifetime) 3   Protective Factors  (Lifetime) 2   Reasons for Ideation (Lifetime) 4   Most Severe Ideation Rating (Past Month) NA   Frequency (Past Month) NA   Duration (Past Month) NA   Controllability (Past Month) NA   Protective Factors (Past Month) NA   Reasons for Ideation (Past Month) NA   Actual Attempt (Lifetime) Yes   Actual Attempt (Past 3 Months) No   Has subject engaged in non-suicidal self-injurious behavior? (Lifetime) No   Has subject engaged in non-suicidal self-injurious behavior? (Past 3 Months) No   Interrupted Attempts (Lifetime) No   Interrupted Attempts (Past 3 Months) No   Aborted or Self-Interrupted Attempt (Lifetime) No   Aborted or Self-Interrupted Attempt (Past 3 Months) No   Preparatory Acts or Behavior (Lifetime) No   Preparatory Acts or Behavior (Past 3 Months) No   Most Recent Attempt Actual Lethality Code 2   Most Lethal Attempt Actual Lethality Code 2   Initial/First Attempt Actual Lethality Code 2       Appearance:   Unable to assess   Eye Contact:   Unable to assess   Psychomotor Behavior: Unable to assess   Attitude:   Cooperative  Friendly Pleasant  Orientation:   All  Speech   Rate / Production: Normal    Volume:  Normal   Mood:    Normal  Affect:    Appropriate   Thought Content:  Clear   Thought Form:  Coherent  Logical   Insight:    Good     Diagnoses:  1. Major depressive disorder, recurrent episode, mild (H)        Collateral Reports Completed:   Not Applicable    Plan: (Homework, other):  Patient was given information about behavioral services and encouraged to schedule a follow up appointment with the clinic [facility] [place] in 2 weeks. CD Recommendations: No indications of CD issues. Edmundo Hansen, MARJAN

## 2020-11-05 NOTE — TELEPHONE ENCOUNTER
I got to this message by an accident. It wasn't relayed to me.    [facility] staff, please discuss the plan with the pharmacy.    Perez Patino MD

## 2020-11-18 ENCOUNTER — VIRTUAL VISIT (OUTPATIENT)
Dept: BEHAVIORAL HEALTH | Facility: CLINIC | Age: 40
End: 2020-11-18
Payer: COMMERCIAL

## 2020-11-18 DIAGNOSIS — F33.0 MAJOR DEPRESSIVE DISORDER, RECURRENT EPISODE, MILD (H): Primary | ICD-10-CM

## 2020-11-18 PROCEDURE — 90834 PSYTX W PT 45 MINUTES: CPT | Mod: 95 | Performed by: PSYCHOLOGIST

## 2020-11-18 NOTE — PROGRESS NOTES
"[facility]: Integrated Behavioral Health  November 18, 2020      Behavioral Health Clinician Progress Note    Patient Name: Shadia Mcgrath           Service Type: Phone Visit      Service Location:  phone visit      Session Start Time: 3:05  Session End Time: 3:50      Session Length: 38 - 52      Attendees: Patient     Visit Activities (Refresh list every visit): Phone Encounter    Shadia Mcgrath is a 40 year old female who is being evaluated via a telephone visit.      The patient has been notified of the following:     \"We have found that certain health care needs can be provided without the need for a face to face visit.  This service lets us provide the care you need with a short phone conversation.      I will have full access to your [place] medical record during this entire phone call.   I will be taking notes for your medical record.     Since this is like an office visit, we will bill your insurance company for this service.  Please check with your medical insurance if this type of telephone visit/virtual care is covered.  You may be responsible for the cost of this service if insurance coverage is denied.      There are potential benefits and risks of telephone visits (e.g. limits to patient confidentiality) that differ from in-person visits.?  Confidentiality still applies for telephone services, and nobody will record the visit.  It is important to be in a quiet, private space that is free of distractions (including cell phone or other devices) during the visit.??     If during the course of the call I believe a telephone visit is not appropriate, you will not be charged for this service\"    Consent has been obtained for this service by care team member: yes.    Diagnostic Assessment Date: 10/5/2020  Treatment Plan Review Date: 2/18/2021  See Flowsheets for today's PHQ-9 and ZACH-7 results  Previous PHQ-9:   PHQ-9 SCORE 8/27/2020   PHQ-9 Total Score 13 "     Previous ZACH-7: No flowsheet data found.     MELVIN LEVEL:  No flowsheet data found.    DATA  Extended Session (60+ minutes): No  Interactive Complexity: No  Crisis: No    Treatment Objective(s) Addressed in This Session:  Target Behavior(s): disease management/lifestyle changes related to psychosocial and relational stress    Depressed Mood: Decrease frequency and intensity of feeling down, depressed, hopeless  Relationship Problems: will address relationship difficulties in a more adaptive manner    Current Stressors / Issues:  Shadia's condition appears about the same from our last visit. She reports attending the  for her friend who passed away a few weeks ago and said this went well. We explored a bit of her experience with this, as she was fearful last time we spoke about being judged for going. I shared a few process comments with Shadia and we explored an identified fear of being judged/negatively evaluated by others. We discussed some of how this contributes to her depressed mood and tendency to mask her true feelings. Shadia states she is quick to appear fine on the outside, but reports she is really struggling internally. We talked about how she has been dismissed by therapists before because they say to her that she no longer needs counseling. We emphasized this tendency today by looking into her tendency to mask feelings and why she thinks she fears judgement from others. I shared a few additional comments about our process to help her build insight to a few relational dynamics I suspect are contributing to her depressive issues. In particular, we explored how her masking, withholding feelings, and excessively helping others, even if its inconvenient for her, all contribute to her depression. We discussed how people-pleasing can reflect unconscious fears of rejection abandonment in her life. We ended with discussing how good care of others starts with suitable self-care. Shadia agreed with  these observations and notes taking care of herself is difficult and she has a hard time saying no to others. We agreed to continue looking into this during therapy.     Progress on Treatment Objective(s) / Homework:  Minimal progress - CONTEMPLATION (Considering change and yet undecided); Intervened by assessing the negative and positive thinking (ambivalence) about behavior change      Supportive counseling insight-oriented counseling with emphasis on guided discovery and exploration of interpersonal dynamics.     Care Plan review completed: No    Medication Review:  No changes to current psychiatric medication(s)    Medication Compliance:  Yes    Changes in Health Issues:   None reported    Chemical Use Review:   Substance Use: Chemical use reviewed, no active concerns identified      Tobacco Use: No current tobacco use.      Assessment: Current Emotional / Mental Status (status of significant symptoms):  Risk status (Self / Other harm or suicidal ideation)  Patient has had a history of suicidal ideation: SI as a youth only and suicide attempts: one prior attempt at age 15; none recent  Patient denies current fears or concerns for personal safety.     Patient denies current or recent suicidal ideation or behaviors.  Patient denies current or recent homicidal ideation or behaviors.  Patient denies current or recent self injurious behavior or ideation.  Patient denies other safety concerns.     A safety and risk management plan has not been developed at this time, however patient was encouraged to call [place] [phone] should there be a change in any of these risk factors.    [place] Suicide Severity Rating Scale (Lifetime/Recent)  [place] Suicide Severity Rating (Lifetime/Recent) 9/21/2020   1. Wish to be Dead (Lifetime) Yes   1. Wish to be Dead (Recent) No   2. Non-Specific Active Suicidal Thoughts (Lifetime) Yes   2. Non-Specific Active Suicidal Thoughts (Recent) No   3. Active Suicidal Ideation with any  Methods (Not Plan) Without Intent to Act (Lifetime) Yes   3. Active Sucidal Ideation with any Methods (Not Plan) Without Intent to Act (Recent) No   4. Active Suicidal Ideation with Some Intent to Act, Without Specific Plan (Lifetime) Yes   4. Active Suicidal Ideation with Some Intent to Act, Without Specific Plan (Recent) No   5. Active Suicidal Ideation with Specific Plan and Intent (Lifetime) Yes   5. Active Suicidal Ideation with Specific Plan and Intent (Recent) No   Most Severe Ideation Rating (Lifetime) 4   Frequency (Lifetime) 4   Duration (Lifetime) 4   Controllability (Lifetime) 3   Protective Factors  (Lifetime) 2   Reasons for Ideation (Lifetime) 4   Most Severe Ideation Rating (Past Month) NA   Frequency (Past Month) NA   Duration (Past Month) NA   Controllability (Past Month) NA   Protective Factors (Past Month) NA   Reasons for Ideation (Past Month) NA   Actual Attempt (Lifetime) Yes   Actual Attempt (Past 3 Months) No   Has subject engaged in non-suicidal self-injurious behavior? (Lifetime) No   Has subject engaged in non-suicidal self-injurious behavior? (Past 3 Months) No   Interrupted Attempts (Lifetime) No   Interrupted Attempts (Past 3 Months) No   Aborted or Self-Interrupted Attempt (Lifetime) No   Aborted or Self-Interrupted Attempt (Past 3 Months) No   Preparatory Acts or Behavior (Lifetime) No   Preparatory Acts or Behavior (Past 3 Months) No   Most Recent Attempt Actual Lethality Code 2   Most Lethal Attempt Actual Lethality Code 2   Initial/First Attempt Actual Lethality Code 2       Appearance:   Unable to assess   Eye Contact:   Unable to assess   Psychomotor Behavior: Unable to assess   Attitude:   Cooperative  Friendly Pleasant  Orientation:   All  Speech   Rate / Production: Normal    Volume:  Normal   Mood:    Normal  Affect:    Appropriate   Thought Content:  Clear   Thought Form:  Coherent  Logical   Insight:    Good     Diagnoses:  1. Major depressive disorder, recurrent episode,  mild (H)        Collateral Reports Completed:   Not Applicable    Plan: (Homework, other):  Patient was given information about behavioral services and encouraged to schedule a follow up appointment with the clinic [facility] in 2 weeks. Provided self-reflection and thought exercises based on topics covered today for HW. CD Recommendations: No indications of CD issues. Edmundo [place], LP     ______________________________________________________________________    [facility] Integrated [facility]al Health Treatment Plan    Client's Name: Shadia Mcgrath  YOB: 1980    Date:11/18/2020    DSM-V Diagnoses: 296.31 (F33.0) Major Depressive Disorder, Recurrent Episode, Mild _  WHODAS: none on file  Clinical Global Impressions  First:  Considering your total clinical experience with this particular patient population, how severe are the patient's symptoms at this time?: 4 (11/20/2020  1:48 PM)      Most recent:  No data recorded        Referral / Collaboration:  Will collaborate with care team as indicated during treatment.    Anticipated number of session or this episode of care: 10+      MeasurableTreatment Goal(s) related to diagnosis / functional impairment(s)  Goal 1:  Patient will experience a reduction in depressive and anxious symptoms, along with a corresponding increase in positive emotion and life satisfaction.    Objective #A: Patient will experience a reduction in depressed mood, will develop more effective coping skills to manage depressive symptoms, will develop healthy cognitive patterns and beliefs, will increase ability to function adaptively and will continue to take medications as prescribed / participate in supportive activities and services    Status: Continued - Date(s): 11/18/2020    Objective #B: Patient will experience a reduction in anxiety, will develop more effective coping skills to manage anxiety symptoms, will develop healthy cognitive patterns and beliefs and will increase ability to  function adaptively  Status: Continued - Date(s): 11/18/2020    Objective #C: Patient will develop better understanding of triggers and coping strategies to stabilize mood  Status: Continued - Date(s):11/18/2020    Goal 2:  Patient will identify and increase engagement in valued activity, i.e. improving social connections/relationships, pursuing occupational goals or personally meaningful pursuits, exploration of meaning in life.     Objective #A: Patient will identify meaningful activity in social, occupational and  personal goals, and increase behavioral activation around these goals   Status: Continued - Date(s): 11/18/2020    Objective #B: Patient will address relationship difficulties in a more adaptive manner  Status: Continued - Date(s): 11/18/2020    Objective #C: Patient will develop coping/problem-solving skills to facilitate more adaptive adjustment and will effectively address problems that interfere with adaptive functioning  Status: Continued - Date(s): 11/18/2020      Possible Therapeutic Intervention(s)  Psycho-education regarding mental health diagnoses and treatment options      Skills training    Explore skills useful to client in current situation.    Skills include assertiveness, communication, conflict management, problem-solving, relaxation, etc.    Solution-Focused Therapy    Explore patterns in patient's relationships and discuss options for new behaviors.    Explore patterns in patient's actions and choices and discuss options for new behaviors.    Cognitive-behavioral Therapy    Discuss common cognitive distortions, identify them in patient's life.    Explore ways to challenge, replace, and act against these cognitions.    Acceptance and Commitment Therapy    Explore and identify important values in patient's life.    Discuss ways to commit to behavioral activation around these values.    Psychodynamic psychotherapy    Discuss patient's emotional dynamics and issues and how they impact  behaviors.    Explore patient's history of relationships and how they impact present behaviors.    Explore how to work with and make changes in these schemas and patterns.    Narrative Therapy    Explore the patient's story of his/her life from his/her perspective.    Explore alternate ways of understanding their experience, identifying exceptions, developing new themes.    Interpersonal Psychotherapy    Explore patterns in relationships that are effective or ineffective at helping patient reach their goals, find satisfying experience.    Discuss new patterns or behaviors to engage in for improved social functioning.    Behavioral Activation    Discuss steps patient can take to become more involved in meaningful activity.    Identify barriers to these activities and explore possible solutions.    Mindfulness-Based Strategies    Discuss skills based on development and application of mindfulness.    Skills drawn from compassion-focused therapy, dialectical behavior therapy, mindfulness-based stress reduction, mindfulness-based cognitive therapy, etc.      We have developed these goals together during our work to this point. Patient has assisted in the development of these goals and has agreed to this treatment plan.       Edmundo Hansen, MARJAN  November 18, 2020

## 2020-11-23 DIAGNOSIS — J31.0 CHRONIC RHINITIS: ICD-10-CM

## 2020-11-23 DIAGNOSIS — R05.9 COUGH: ICD-10-CM

## 2020-11-23 DIAGNOSIS — J30.0 CHRONIC VASOMOTOR RHINITIS: ICD-10-CM

## 2020-11-23 DIAGNOSIS — D80.1 HYPOGAMMAGLOBULINEMIA (H): ICD-10-CM

## 2020-11-23 LAB
ALBUMIN SERPL-MCNC: 4.1 G/DL (ref 3.4–5)
ALBUMIN UR-MCNC: NEGATIVE MG/DL
ALP SERPL-CCNC: 133 U/L (ref 40–150)
ALT SERPL W P-5'-P-CCNC: 42 U/L (ref 0–50)
ANION GAP SERPL CALCULATED.3IONS-SCNC: 8 MMOL/L (ref 3–14)
APPEARANCE UR: CLEAR
AST SERPL W P-5'-P-CCNC: 26 U/L (ref 0–45)
BASOPHILS # BLD AUTO: 0.1 10E9/L (ref 0–0.2)
BASOPHILS NFR BLD AUTO: 0.7 %
BILIRUB SERPL-MCNC: 0.3 MG/DL (ref 0.2–1.3)
BILIRUB UR QL STRIP: NEGATIVE
BUN SERPL-MCNC: 20 MG/DL (ref 7–30)
CALCIUM SERPL-MCNC: 9.3 MG/DL (ref 8.5–10.1)
CHLORIDE SERPL-SCNC: 109 MMOL/L (ref 94–109)
CO2 SERPL-SCNC: 26 MMOL/L (ref 20–32)
COLOR UR AUTO: NORMAL
CREAT SERPL-MCNC: 0.68 MG/DL (ref 0.52–1.04)
DIFFERENTIAL METHOD BLD: ABNORMAL
EOSINOPHIL NFR BLD AUTO: 2.6 %
ERYTHROCYTE [DISTWIDTH] IN BLOOD BY AUTOMATED COUNT: 12.2 % (ref 10–15)
GFR SERPL CREATININE-BSD FRML MDRD: >90 ML/MIN/{1.73_M2}
GLUCOSE SERPL-MCNC: 87 MG/DL (ref 70–99)
GLUCOSE UR STRIP-MCNC: NEGATIVE MG/DL
HCT VFR BLD AUTO: 41.5 % (ref 35–47)
HGB BLD-MCNC: 13.8 G/DL (ref 11.7–15.7)
HGB UR QL STRIP: NEGATIVE
IMM GRANULOCYTES # BLD: 0.1 10E9/L (ref 0–0.4)
IMM GRANULOCYTES NFR BLD: 0.5 %
KETONES UR STRIP-MCNC: NEGATIVE MG/DL
LEUKOCYTE ESTERASE UR QL STRIP: NEGATIVE
LYMPHOCYTES # BLD AUTO: 2.3 10E9/L (ref 0.8–5.3)
LYMPHOCYTES NFR BLD AUTO: 20.6 %
MCH RBC QN AUTO: 32.8 PG (ref 26.5–33)
MCHC RBC AUTO-ENTMCNC: 33.3 G/DL (ref 31.5–36.5)
MCV RBC AUTO: 99 FL (ref 78–100)
MONOCYTES # BLD AUTO: 0.6 10E9/L (ref 0–1.3)
MONOCYTES NFR BLD AUTO: 5.6 %
NEUTROPHILS # BLD AUTO: 7.9 10E9/L (ref 1.6–8.3)
NEUTROPHILS NFR BLD AUTO: 70 %
NITRATE UR QL: NEGATIVE
NRBC # BLD AUTO: 0 10*3/UL
NRBC BLD AUTO-RTO: 0 /100
PH UR STRIP: 6 PH (ref 5–7)
PLATELET # BLD AUTO: 372 10E9/L (ref 150–450)
POTASSIUM SERPL-SCNC: 3.5 MMOL/L (ref 3.4–5.3)
PROT SERPL-MCNC: 7.3 G/DL (ref 6.8–8.8)
RBC # BLD AUTO: 4.21 10E12/L (ref 3.8–5.2)
SODIUM SERPL-SCNC: 143 MMOL/L (ref 133–144)
SOURCE: NORMAL
SP GR UR STRIP: 1.01 (ref 1–1.03)
UROBILINOGEN UR STRIP-MCNC: 0 MG/DL (ref 0–2)
WBC # BLD AUTO: 11.3 10E9/L (ref 4–11)

## 2020-11-23 PROCEDURE — 86360 T CELL ABSOLUTE COUNT/RATIO: CPT | Performed by: ALLERGY & IMMUNOLOGY

## 2020-11-23 PROCEDURE — 85025 COMPLETE CBC W/AUTO DIFF WBC: CPT | Performed by: ALLERGY & IMMUNOLOGY

## 2020-11-23 PROCEDURE — 86317 IMMUNOASSAY INFECTIOUS AGENT: CPT | Mod: 90 | Performed by: ALLERGY & IMMUNOLOGY

## 2020-11-23 PROCEDURE — 86003 ALLG SPEC IGE CRUDE XTRC EA: CPT | Performed by: ALLERGY & IMMUNOLOGY

## 2020-11-23 PROCEDURE — 82784 ASSAY IGA/IGD/IGG/IGM EACH: CPT | Performed by: ALLERGY & IMMUNOLOGY

## 2020-11-23 PROCEDURE — 82785 ASSAY OF IGE: CPT | Performed by: ALLERGY & IMMUNOLOGY

## 2020-11-23 PROCEDURE — 86355 B CELLS TOTAL COUNT: CPT | Performed by: ALLERGY & IMMUNOLOGY

## 2020-11-23 PROCEDURE — 86162 COMPLEMENT TOTAL (CH50): CPT | Performed by: ALLERGY & IMMUNOLOGY

## 2020-11-23 PROCEDURE — 83520 IMMUNOASSAY QUANT NOS NONAB: CPT | Mod: 90 | Performed by: ALLERGY & IMMUNOLOGY

## 2020-11-23 PROCEDURE — 81003 URINALYSIS AUTO W/O SCOPE: CPT | Performed by: ALLERGY & IMMUNOLOGY

## 2020-11-23 PROCEDURE — 86359 T CELLS TOTAL COUNT: CPT | Performed by: ALLERGY & IMMUNOLOGY

## 2020-11-23 PROCEDURE — 86357 NK CELLS TOTAL COUNT: CPT | Performed by: ALLERGY & IMMUNOLOGY

## 2020-11-23 PROCEDURE — 84165 PROTEIN E-PHORESIS SERUM: CPT | Performed by: PATHOLOGY

## 2020-11-23 PROCEDURE — 36415 COLL VENOUS BLD VENIPUNCTURE: CPT | Performed by: ALLERGY & IMMUNOLOGY

## 2020-11-23 PROCEDURE — 86774 TETANUS ANTIBODY: CPT | Performed by: ALLERGY & IMMUNOLOGY

## 2020-11-23 PROCEDURE — 80053 COMPREHEN METABOLIC PANEL: CPT | Performed by: ALLERGY & IMMUNOLOGY

## 2020-11-23 PROCEDURE — 99000 SPECIMEN HANDLING OFFICE-LAB: CPT | Performed by: ALLERGY & IMMUNOLOGY

## 2020-11-24 ENCOUNTER — TELEPHONE (OUTPATIENT)
Dept: ALLERGY | Facility: CLINIC | Age: 40
End: 2020-11-24

## 2020-11-24 DIAGNOSIS — R05.9 COUGH: Primary | ICD-10-CM

## 2020-11-24 LAB
ALBUMIN SERPL ELPH-MCNC: 4.4 G/DL (ref 3.7–5.1)
ALPHA1 GLOB SERPL ELPH-MCNC: 0.3 G/DL (ref 0.2–0.4)
ALPHA2 GLOB SERPL ELPH-MCNC: 0.8 G/DL (ref 0.5–0.9)
B-GLOBULIN SERPL ELPH-MCNC: 0.9 G/DL (ref 0.6–1)
C TETANI IGG SER IA-ACNC: 0.87 IU/ML
CD19 CELLS # BLD: 256 CELLS/UL (ref 107–698)
CD19 CELLS NFR BLD: 10 % (ref 6–27)
CD3 CELLS # BLD: 1951 CELLS/UL (ref 603–2990)
CD3 CELLS NFR BLD: 80 % (ref 49–84)
CD3+CD4+ CELLS # BLD: 1382 CELLS/UL (ref 441–2156)
CD3+CD4+ CELLS NFR BLD: 57 % (ref 28–63)
CD3+CD4+ CELLS/CD3+CD8+ CLL BLD: 2.71 % (ref 1.4–2.6)
CD3+CD8+ CELLS # BLD: 509 CELLS/UL (ref 125–1312)
CD3+CD8+ CELLS NFR BLD: 21 % (ref 10–40)
CD3-CD16+CD56+ CELLS # BLD: 213 CELLS/UL (ref 95–640)
CD3-CD16+CD56+ CELLS NFR BLD: 9 % (ref 4–25)
GAMMA GLOB SERPL ELPH-MCNC: 0.6 G/DL (ref 0.7–1.6)
IFC SPECIMEN: ABNORMAL
IGA SERPL-MCNC: 230 MG/DL (ref 84–499)
IGG SERPL-MCNC: 513 MG/DL (ref 610–1616)
IGM SERPL-MCNC: 108 MG/DL (ref 35–242)
M PROTEIN SERPL ELPH-MCNC: 0 G/DL
PROT PATTERN SERPL ELPH-IMP: ABNORMAL

## 2020-11-24 RX ORDER — AZITHROMYCIN 250 MG/1
TABLET, FILM COATED ORAL
Qty: 12 TABLET | Refills: 3 | Status: SHIPPED | OUTPATIENT
Start: 2020-11-24 | End: 2021-04-19

## 2020-11-24 NOTE — TELEPHONE ENCOUNTER
[facility] Call [facility]    Phone Message    May a detailed message be left on voicemail: yes     Reason for Call: Medication Refill Request    Has the patient contacted the pharmacy for the refill? Yes     Name of medication being requested: azithromycin (ZITHROMAX) 250 MG tablet    Provider who prescribed the medication: Dr. Patino    Pharmacy: [facility]    Date medication is needed: Soon    The pharm said they have been sending refill request but have not heard from us. Please send it over to them or call them if you have any questions. Thanks      Action Taken: Message routed to:  [facility] ([facility]): allergy    Travel Screening: Not Applicable

## 2020-11-25 LAB — CH50 SERPL-ACNC: >95 U/ML (ref 38.7–89.9)

## 2020-11-26 LAB
CALIF WALNUT IGE QN: <0.1 KU/L
DEPRECATED MISC ALLERGEN IGE RAST QL: NORMAL

## 2020-11-27 LAB
A ALTERNATA IGE QN: <0.1 KU(A)/L
A FUMIGATUS IGE QN: <0.1 KU(A)/L
C HERBARUM IGE QN: <0.1 KU(A)/L
CAT DANDER IGG QN: <0.1 KU(A)/L
CEDAR IGE QN: <0.1 KU(A)/L
COCKSFOOT IGE QN: <0.1 KU(A)/L
COMMON RAGWEED IGE QN: <0.1 KU(A)/L
COTTONWOOD IGE QN: <0.1 KU(A)/L
D FARINAE IGE QN: <0.1 KU(A)/L
D PTERONYSS IGE QN: <0.1 KU(A)/L
DEPRECATED S PNEUM 1 IGG SER-MCNC: 2.2 MCG/ML
DEPRECATED S PNEUM12 IGG SER-MCNC: <0.4 MCG/ML
DEPRECATED S PNEUM14 IGG SER-MCNC: <0.4 MCG/ML
DEPRECATED S PNEUM17 IGG SER-MCNC: 2.9 MCG/ML
DEPRECATED S PNEUM19 IGG SER-MCNC: 3 MCG/ML
DEPRECATED S PNEUM2 IGG SER-MCNC: 11.7 MCG/ML
DEPRECATED S PNEUM20 IGG SER-MCNC: 0.5 MCG/ML
DEPRECATED S PNEUM22 IGG SER-MCNC: 4 MCG/ML
DEPRECATED S PNEUM23 IGG SER-MCNC: 5.8 MCG/ML
DEPRECATED S PNEUM3 IGG SER-MCNC: 0.6 MCG/ML
DEPRECATED S PNEUM34 IGG SER-MCNC: 1.2 MCG/ML
DEPRECATED S PNEUM4 IGG SER-MCNC: 1.1 MCG/ML
DEPRECATED S PNEUM43 IGG SER-MCNC: 0.8 MCG/ML
DEPRECATED S PNEUM5 IGG SER-MCNC: 0.5 MCG/ML
DEPRECATED S PNEUM8 IGG SER-MCNC: 3.7 MCG/ML
DEPRECATED S PNEUM9 IGG SER-MCNC: NORMAL UG/ML
DOG DANDER+EPITH IGE QN: <0.1 KU(A)/L
E PURPURASCENS IGE QN: <0.1 KU(A)/L
EAST WHITE PINE IGE QN: <0.1 KU(A)/L
ENGL PLANTAIN IGE QN: <0.1 KU(A)/L
FIREBUSH IGE QN: <0.1 KU(A)/L
GIANT RAGWEED IGE QN: <0.1 KU(A)/L
GOOSEFOOT IGE QN: <0.1 KU(A)/L
HAEM INFLU B IGG SER-MCNC: 0 UG/ML
IGE SERPL-ACNC: 11 KIU/L (ref 0–114)
JOHNSON GRASS IGE QN: <0.1 KU(A)/L
MAPLE IGE QN: <0.1 KU(A)/L
MUGWORT IGE QN: <0.1 KU(A)/L
NETTLE IGE QN: <0.1 KU(A)/L
P NOTATUM IGE QN: <0.1 KU(A)/L
RED MULBERRY IGE QN: <0.1 KU(A)/L
S PNEUM DA 15B IGG SER-MCNC: 1.3 MCG/ML
S PNEUM DA 18C IGG SER-MCNC: 1.4 MCG/ML
S PNEUM DA 19A IGG SER-MCNC: 2.5 MCG/ML
S PNEUM DA 33F IGG SER-MCNC: 1 MCG/ML
S PNEUM DA 6B IGG SER-MCNC: <0.4 MCG/ML
S PNEUM DA 7F IGG SER-MCNC: 11.4 MCG/ML
S PNEUM DA 9V IGG SER-MCNC: 1.7 MCG/ML
SALTWORT IGE QN: <0.1 KU(A)/L
SHEEP SORREL IGE QN: <0.1 KU(A)/L
SILVER BIRCH IGE QN: <0.1 KU(A)/L
TIMOTHY IGE QN: <0.1 KU(A)/L
WHITE ASH IGE QN: <0.1 KU(A)/L
WHITE ELM IGE QN: <0.1 KU(A)/L
WHITE MULBERRY IGE QN: <0.1 KU(A)/L
WHITE OAK IGE QN: <0.1 KU(A)/L
WORMWOOD IGE QN: <0.1 KU(A)/L

## 2020-12-01 LAB — CALIF WALNUT POLN IGE QN: <0.1 KU(A)/L

## 2020-12-03 LAB — MANNOSE-BP SER-MCNC: <50 NG/ML

## 2020-12-07 NOTE — RESULT ENCOUNTER NOTE
CBC with differential with elevated WBC, suggesting that she may have an infection or a chronic inflammation.    Serum IgE for regional aeroallergen panel is negative.  Suggesting that the patient's rhinitis symptoms are not allergic in nature.  Based on that, I am unable to recommend avoidance measures or allergen immunotherapy.    SPEP without evidence of monoclonal gammopathy.  Urine analysis is within normal limits without proteinuria that could could have explained hypogammaglobulinemia.  Comprehensive metabolic panel within normal limits.  Albumin and total protein are within normal limits.    No deficiency of total complement noted.  T and B cell panel is within normal limits.  Immunoglobulins A, M, and E are within normal limits    Immunoglobulin G is decreased, [phone]).  Protective tetanus antibody level.  Nonprotective Hib antibody level, postvaccination.  Pneumococcal antibody levels are 11/22 protective.  This is the level after the vaccination at the beginning of October 2020.  While there is a response to pneumococcal vaccine it is suboptimal.  Prevaccination levels were 2/23 protective.    MBL deficiency noted as well.      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  Patients with MBL deficiency may be prone to recurrent infections, including infections of upper respiratory tract or other body systems.  She also has hypogammaglobulinemia with specific antibody deficiency.  It means that her immunoglobulins are low and their function is compromised as well.    Considering that, she may be a candidate for chronic antibiotic prophylaxis or immunoglobulin G replacement.  -I suggest the patient to set up an appointment, face-to-face, to discuss her options.  I may need 1 hour for this appointment.  Please do not double book the patient. If there is no availble slot at [facility], she will need to be seen in [facility].

## 2020-12-09 ENCOUNTER — VIRTUAL VISIT (OUTPATIENT)
Dept: BEHAVIORAL HEALTH | Facility: CLINIC | Age: 40
End: 2020-12-09
Payer: COMMERCIAL

## 2020-12-09 DIAGNOSIS — F33.0 MAJOR DEPRESSIVE DISORDER, RECURRENT EPISODE, MILD (H): Primary | ICD-10-CM

## 2020-12-09 PROCEDURE — 90832 PSYTX W PT 30 MINUTES: CPT | Mod: 95 | Performed by: PSYCHOLOGIST

## 2020-12-09 NOTE — PROGRESS NOTES
"[facility]: Integrated Behavioral Health  December 9, 2020      Behavioral Health Clinician Progress Note    Patient Name: Shadia Mcgrath           Service Type: Phone Visit      Service Location:  phone visit      Session Start Time: 3:30  Session End Time: 3:55      Session Length: 16 - 37      Attendees: Patient     Visit Activities (Refresh list every visit): Phone Encounter    Shadia Mcgrath is a 40 year old female who is being evaluated via a telephone visit.      The patient has been notified of the following:     \"We have found that certain health care needs can be provided without the need for a face to face visit.  This service lets us provide the care you need with a short phone conversation.      I will have full access to your [place] medical record during this entire phone call.   I will be taking notes for your medical record.     Since this is like an office visit, we will bill your insurance company for this service.  Please check with your medical insurance if this type of telephone visit/virtual care is covered.  You may be responsible for the cost of this service if insurance coverage is denied.      There are potential benefits and risks of telephone visits (e.g. limits to patient confidentiality) that differ from in-person visits.?  Confidentiality still applies for telephone services, and nobody will record the visit.  It is important to be in a quiet, private space that is free of distractions (including cell phone or other devices) during the visit.??     If during the course of the call I believe a telephone visit is not appropriate, you will not be charged for this service\"    Consent has been obtained for this service by care team member: yes.    Diagnostic Assessment Date: 10/5/2020  Treatment Plan Review Date: 2/18/2021  See Flowsheets for today's PHQ-9 and ZACH-7 results  Previous PHQ-9:   PHQ-9 SCORE 8/27/2020   PHQ-9 Total Score 13 "     Previous ZACH-7: No flowsheet data found.     MELVIN LEVEL:  No flowsheet data found.    DATA  Extended Session (60+ minutes): No  Interactive Complexity: No  Crisis: No    Treatment Objective(s) Addressed in This Session:  Target Behavior(s): disease management/lifestyle changes related to psychosocial and relational stress    Depressed Mood: Decrease frequency and intensity of feeling down, depressed, hopeless  Relationship Problems: will address relationship difficulties in a more adaptive manner    Current Stressors / Issues:  Shadia was called at 3:00 for our [facility] appointment today, though she was driving. I shared that I didn't think it was safe to talk while she was driving, so we agreed to talk in about 30 minutes. After I called her back, Shadia said she is going quite well. She reported feeling surprised that she is feeling more upbeat, positive right now, as usually this time of year is hard for her because of the weather. She reported the colder weather and darker days tends to make her mood feel more down and her pain worse. We explored a bit of why she is doing better, to which Shadia shared she has had several positive social interactions lately and has good social support.     She does report some worse pain lately. Shadia indicated she can overly rely on Tylenol and pain medication for help, but she has been exercising, doing yoga, using OTCs to help and finds this beneficial. She has reportedly cut her Tylenol use way down. We talked briefly about using relaxation strategies to manage pain and I shared some information about chronic pain management. I encouraged her to use deep breathing, among other relaxation strategies, to add to her coping skills. She reports using yoga from time to time and I encouraged this as well.     Supportive and solution-focused counseling was our emphasis today.     Progress on Treatment Objective(s) / Homework:  Satisfactory progress - CONTEMPLATION (Considering  change and yet undecided); Intervened by assessing the negative and positive thinking (ambivalence) about behavior change    Supportive counseling insight-oriented counseling with emphasis on guided discovery and exploration of interpersonal dynamics.     Care Plan review completed: No    Medication Review:  No changes to current psychiatric medication(s)    Medication Compliance:  Yes    Changes in Health Issues:   None reported    Chemical Use Review:   Substance Use: Chemical use reviewed, no active concerns identified      Tobacco Use: No current tobacco use.      Assessment: Current Emotional / Mental Status (status of significant symptoms):  Risk status (Self / Other harm or suicidal ideation)  Patient has had a history of suicidal ideation: SI as a youth only and suicide attempts: one prior attempt at age 15; none recent  Patient denies current fears or concerns for personal safety.     Patient denies current or recent suicidal ideation or behaviors.  Patient denies current or recent homicidal ideation or behaviors.  Patient denies current or recent self injurious behavior or ideation.  Patient denies other safety concerns.     A safety and risk management plan has not been developed at this time, however patient was encouraged to call [place] [phone] should there be a change in any of these risk factors.    Carver Suicide Severity Rating Scale (Lifetime/Recent)  Carver Suicide Severity Rating (Lifetime/Recent) 9/21/2020   1. Wish to be Dead (Lifetime) Yes   1. Wish to be Dead (Recent) No   2. Non-Specific Active Suicidal Thoughts (Lifetime) Yes   2. Non-Specific Active Suicidal Thoughts (Recent) No   3. Active Suicidal Ideation with any Methods (Not Plan) Without Intent to Act (Lifetime) Yes   3. Active Sucidal Ideation with any Methods (Not Plan) Without Intent to Act (Recent) No   4. Active Suicidal Ideation with Some Intent to Act, Without Specific Plan (Lifetime) Yes   4. Active Suicidal Ideation  with Some Intent to Act, Without Specific Plan (Recent) No   5. Active Suicidal Ideation with Specific Plan and Intent (Lifetime) Yes   5. Active Suicidal Ideation with Specific Plan and Intent (Recent) No   Most Severe Ideation Rating (Lifetime) 4   Frequency (Lifetime) 4   Duration (Lifetime) 4   Controllability (Lifetime) 3   Protective Factors  (Lifetime) 2   Reasons for Ideation (Lifetime) 4   Most Severe Ideation Rating (Past Month) NA   Frequency (Past Month) NA   Duration (Past Month) NA   Controllability (Past Month) NA   Protective Factors (Past Month) NA   Reasons for Ideation (Past Month) NA   Actual Attempt (Lifetime) Yes   Actual Attempt (Past 3 Months) No   Has subject engaged in non-suicidal self-injurious behavior? (Lifetime) No   Has subject engaged in non-suicidal self-injurious behavior? (Past 3 Months) No   Interrupted Attempts (Lifetime) No   Interrupted Attempts (Past 3 Months) No   Aborted or Self-Interrupted Attempt (Lifetime) No   Aborted or Self-Interrupted Attempt (Past 3 Months) No   Preparatory Acts or Behavior (Lifetime) No   Preparatory Acts or Behavior (Past 3 Months) No   Most Recent Attempt Actual Lethality Code 2   Most Lethal Attempt Actual Lethality Code 2   Initial/First Attempt Actual Lethality Code 2       Appearance:   Unable to assess   Eye Contact:   Unable to assess   Psychomotor Behavior: Unable to assess   Attitude:   Cooperative  Friendly Pleasant  Orientation:   All  Speech   Rate / Production: Normal    Volume:  Normal   Mood:    Normal  Affect:    Appropriate   Thought Content:  Clear   Thought Form:  Coherent  Logical   Insight:    Good     Diagnoses:  1. Major depressive disorder, recurrent episode, mild (H)        Collateral Reports Completed:   Not Applicable    Plan: (Homework, other):  Patient was given information about behavioral services and encouraged to schedule a follow up appointment with the clinic [facility] [place] in 3 weeks. Provided self-reflection and thought  exercises based on topics covered today for HW. CD Recommendations: No indications of CD issues. Edmundo Powderly, LP     ______________________________________________________________________    [facility] Integrated Behavioral Health Treatment Plan    Client's Name: Shadia Mcgrath  YOB: 1980    Date:11/18/2020    DSM-V Diagnoses: 296.31 (F33.0) Major Depressive Disorder, Recurrent Episode, Mild _  WHODAS: none on file  Clinical Global Impressions  First:  Considering your total clinical experience with this particular patient population, how severe are the patient's symptoms at this time?: 4 (11/20/2020  1:48 PM)      Most recent:  No data recorded        Referral / Collaboration:  Will collaborate with care team as indicated during treatment.    Anticipated number of session or this episode of care: 10+      MeasurableTreatment Goal(s) related to diagnosis / functional impairment(s)  Goal 1:  Patient will experience a reduction in depressive and anxious symptoms, along with a corresponding increase in positive emotion and life satisfaction.    Objective #A: Patient will experience a reduction in depressed mood, will develop more effective coping skills to manage depressive symptoms, will develop healthy cognitive patterns and beliefs, will increase ability to function adaptively and will continue to take medications as prescribed / participate in supportive activities and services    Status: Continued - Date(s): 11/18/2020    Objective #B: Patient will experience a reduction in anxiety, will develop more effective coping skills to manage anxiety symptoms, will develop healthy cognitive patterns and beliefs and will increase ability to function adaptively  Status: Continued - Date(s): 11/18/2020    Objective #C: Patient will develop better understanding of triggers and coping strategies to stabilize mood  Status: Continued - Date(s):11/18/2020    Goal 2:  Patient will identify and increase engagement in  valued activity, i.e. improving social connections/relationships, pursuing occupational goals or personally meaningful pursuits, exploration of meaning in life.     Objective #A: Patient will identify meaningful activity in social, occupational and  personal goals, and increase behavioral activation around these goals   Status: Continued - Date(s): 11/18/2020    Objective #B: Patient will address relationship difficulties in a more adaptive manner  Status: Continued - Date(s): 11/18/2020    Objective #C: Patient will develop coping/problem-solving skills to facilitate more adaptive adjustment and will effectively address problems that interfere with adaptive functioning  Status: Continued - Date(s): 11/18/2020      Possible Therapeutic Intervention(s)  Psycho-education regarding mental health diagnoses and treatment options      Skills training    Explore skills useful to client in current situation.    Skills include assertiveness, communication, conflict management, problem-solving, relaxation, etc.    Solution-Focused Therapy    Explore patterns in patient's relationships and discuss options for new behaviors.    Explore patterns in patient's actions and choices and discuss options for new behaviors.    Cognitive-behavioral Therapy    Discuss common cognitive distortions, identify them in patient's life.    Explore ways to challenge, replace, and act against these cognitions.    Acceptance and Commitment Therapy    Explore and identify important values in patient's life.    Discuss ways to commit to behavioral activation around these values.    Psychodynamic psychotherapy    Discuss patient's emotional dynamics and issues and how they impact behaviors.    Explore patient's history of relationships and how they impact present behaviors.    Explore how to work with and make changes in these schemas and patterns.    Narrative Therapy    Explore the patient's story of his/her life from his/her perspective.    Explore  alternate ways of understanding their experience, identifying exceptions, developing new themes.    Interpersonal Psychotherapy    Explore patterns in relationships that are effective or ineffective at helping patient reach their goals, find satisfying experience.    Discuss new patterns or behaviors to engage in for improved social functioning.    Behavioral Activation    Discuss steps patient can take to become more involved in meaningful activity.    Identify barriers to these activities and explore possible solutions.    Mindfulness-Based Strategies    Discuss skills based on development and application of mindfulness.    Skills drawn from compassion-focused therapy, dialectical behavior therapy, mindfulness-based stress reduction, mindfulness-based cognitive therapy, etc.      We have developed these goals together during our work to this point. Patient has assisted in the development of these goals and has agreed to this treatment plan.       Edmundo Hansen, MARJAN  November 18, 2020

## 2020-12-18 NOTE — RESULT ENCOUNTER NOTE
Please call the patient with results. It has been 11 days since I interpreted them.    CBC with differential with elevated WBC, suggesting that she may have an infection or a chronic inflammation.     Serum IgE for regional aeroallergen panel is negative.  Suggesting that the patient's rhinitis symptoms are not allergic in nature.  Based on that, I am unable to recommend avoidance measures or allergen immunotherapy.     SPEP without evidence of monoclonal gammopathy.  Urine analysis is within normal limits without proteinuria that could could have explained hypogammaglobulinemia.  Comprehensive metabolic panel within normal limits.  Albumin and total protein are within normal limits.     No deficiency of total complement noted.  T and B cell panel is within normal limits.  Immunoglobulins A, M, and E are within normal limits     Immunoglobulin G is decreased, 513 ([id number]).  Protective tetanus antibody level.  Nonprotective Hib antibody level, postvaccination.  Pneumococcal antibody levels are 11/22 protective.  This is the level after the vaccination at the beginning of October 2020.  While there is a response to pneumococcal vaccine it is suboptimal.  Prevaccination levels were 2/23 protective.     MBL deficiency noted as well.        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  Patients with MBL deficiency may be prone to recurrent infections, including infections of upper respiratory tract or other body systems.  She also has hypogammaglobulinemia with specific antibody deficiency.  It means that her immunoglobulins are low and their function is compromised as well.     Considering that, she may be a candidate for chronic antibiotic prophylaxis or immunoglobulin G replacement.  -I suggest the patient to set up an appointment, face-to-face, to discuss her options.  I may need 1 hour for this appointment.  Please do not double book the patient. If  there is no availble slot at [facility], she will need to be seen in [place] location.

## 2020-12-23 ENCOUNTER — TELEPHONE (OUTPATIENT)
Dept: DERMATOLOGY | Facility: CLINIC | Age: 40
End: 2020-12-23

## 2020-12-23 NOTE — TELEPHONE ENCOUNTER
Relayed results to patient from Dr. Patino. Also sent patient e-mail with more specific information.     CBC with differential with elevated WBC, suggesting that she may have an infection or a chronic inflammation.     Serum IgE for regional aeroallergen panel is negative.  Suggesting that the patient's rhinitis symptoms are not allergic in nature.  Based on that, I am unable to recommend avoidance measures or allergen immunotherapy.     SPEP without evidence of monoclonal gammopathy.  Urine analysis is within normal limits without proteinuria that could could have explained hypogammaglobulinemia.  Comprehensive metabolic panel within normal limits.  Albumin and total protein are within normal limits.     No deficiency of total complement noted.  T and B cell panel is within normal limits.  Immunoglobulins A, M, and E are within normal limits     Immunoglobulin G is decreased, 513 ([id number]).  Protective tetanus antibody level.  Nonprotective Hib antibody level, postvaccination.  Pneumococcal antibody levels are 11/22 protective.  This is the level after the vaccination at the beginning of October 2020.  While there is a response to pneumococcal vaccine it is suboptimal.  Prevaccination levels were 2/23 protective.     MBL deficiency noted as well.        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  Patients with MBL deficiency may be prone to recurrent infections, including infections of upper respiratory tract or other body systems.  She also has hypogammaglobulinemia with specific antibody deficiency.  It means that her immunoglobulins are low and their function is compromised as well.     Considering that, she may be a candidate for chronic antibiotic prophylaxis or immunoglobulin G replacement.  -I suggest the patient to set up an appointment, face-to-face, to discuss her options.  I may need 1 hour for this appointment.  Please do not double book  the patient. If [facility], she will need to be seen in [place] location.    Regla Michaels RN

## 2020-12-31 ENCOUNTER — VIRTUAL VISIT (OUTPATIENT)
Dept: BEHAVIORAL HEALTH | Facility: CLINIC | Age: 40
End: 2020-12-31
Payer: COMMERCIAL

## 2020-12-31 DIAGNOSIS — F33.0 MAJOR DEPRESSIVE DISORDER, RECURRENT EPISODE, MILD (H): Primary | ICD-10-CM

## 2020-12-31 PROCEDURE — 90834 PSYTX W PT 45 MINUTES: CPT | Mod: 95 | Performed by: PSYCHOLOGIST

## 2020-12-31 NOTE — PROGRESS NOTES
"[facility]: Integrated Behavioral Health  December 31, 2020      Behavioral Health Clinician Progress Note    Patient Name: Shadia Mcgrath           Service Type: Phone Visit      Service Location:  phone visit      Session Start Time: 3:00  Session End Time: 3:48      Session Length: 38 - 52      Attendees: Patient     Visit Activities (Refresh list every visit): Phone Encounter    Shadia Mcgrath is a 40 year old female who is being evaluated via a telephone visit.      The patient has been notified of the following:     \"We have found that certain health care needs can be provided without the need for a face to face visit.  This service lets us provide the care you need with a short phone conversation.      I will have full access to your [facility] medical record during this entire phone call.   I will be taking notes for your medical record.     Since this is like an office visit, we will bill your insurance company for this service.  Please check with your medical insurance if this type of telephone visit/virtual care is covered.  You may be responsible for the cost of this service if insurance coverage is denied.      There are potential benefits and risks of telephone visits (e.g. limits to patient confidentiality) that differ from in-person visits.?  Confidentiality still applies for telephone services, and nobody will record the visit.  It is important to be in a quiet, private space that is free of distractions (including cell phone or other devices) during the visit.??     If during the course of the call I believe a telephone visit is not appropriate, you will not be charged for this service\"    Consent has been obtained for this service by care team member: yes.    Diagnostic Assessment Date: 10/5/2020  Treatment Plan Review Date: 2/18/2021  See Flowsheets for today's PHQ-9 and ZACH-7 results  Previous PHQ-9:   PHQ-9 SCORE 8/27/2020   PHQ-9 Total Score 13 " "    Previous ZACH-7: No flowsheet data found.     MELVIN LEVEL:  No flowsheet data found.    DATA  Extended Session (60+ minutes): No  Interactive Complexity: No  Crisis: No    Treatment Objective(s) Addressed in This Session:  Target Behavior(s): disease management/lifestyle changes related to psychosocial and relational stress    Depressed Mood: Decrease frequency and intensity of feeling down, depressed, hopeless  Relationship Problems: will address relationship difficulties in a more adaptive manner    Current Stressors / Issues:  Shadia reported concerns with various relationships during our session today. She described running into two family members at [facility] over the holidays and was very upset/frustrated by these encounters. She went into detail about the nature of her conflict with these individuals. She indicated feeling most upset by the impact her conflicts have on her son. We explored her feelings of hurt and anger under an insight-oriented framework. We talked about her son's reactions to these conflicts with family.     Shadia said her son can make violent comments, such as \"Ill give them a bullet\" or \"I should have brought my knife, mom.\" I shared concern about these kind of comments and we talked about how Shadia handles them. I provided feedback about adaptive ways to respond to her son's comments, such explaining in detail why such language is not acceptable. I additionally provided Shadia feedback about how to talk to children about relational conflicts in more adaptive ways. These included not talking negatively or devaluing others, but instead focusing on explaining why behavior of others can be upsetting and how to handle it more effectively.     To note, her son is 10-years old and based on Shadia's self-report, does not have actual homicidal ideation. She indicated he feels hurt/confused by the conflict and says these things without fully understanding them, which was my sense as well. " She indicated her son does not have access to lethal instruments, as he describes.     Supportive and solution-focused counseling was our emphasis today.     Progress on Treatment Objective(s) / Homework:  Satisfactory progress - CONTEMPLATION (Considering change and yet undecided); Intervened by assessing the negative and positive thinking (ambivalence) about behavior change    Supportive counseling insight-oriented counseling with emphasis on guided discovery and interpersonal conflict resolution - focused on improving adaptive responses to conflict with others.     Care Plan review completed: No    Medication Review:  No changes to current psychiatric medication(s)    Medication Compliance:  Yes    Changes in Health Issues:   None reported    Chemical Use Review:   Substance Use: Chemical use reviewed, no active concerns identified      Tobacco Use: No current tobacco use.      Assessment: Current Emotional / Mental Status (status of significant symptoms):  Risk status (Self / Other harm or suicidal ideation)  Patient has had a history of suicidal ideation: SI as a youth only and suicide attempts: one prior attempt at age 15; none recent  Patient denies current fears or concerns for personal safety.     Patient denies current or recent suicidal ideation or behaviors.  Patient denies current or recent homicidal ideation or behaviors.  Patient denies current or recent self injurious behavior or ideation.  Patient denies other safety concerns.     A safety and risk management plan has not been developed at this time, however patient was encouraged to call [place] [phone] should there be a change in any of these risk factors.    [place] Suicide Severity Rating Scale (Lifetime/Recent)  [place] Suicide Severity Rating (Lifetime/Recent) 9/21/2020   1. Wish to be Dead (Lifetime) Yes   1. Wish to be Dead (Recent) No   2. Non-Specific Active Suicidal Thoughts (Lifetime) Yes   2. Non-Specific Active Suicidal Thoughts  (Recent) No   3. Active Suicidal Ideation with any Methods (Not Plan) Without Intent to Act (Lifetime) Yes   3. Active Sucidal Ideation with any Methods (Not Plan) Without Intent to Act (Recent) No   4. Active Suicidal Ideation with Some Intent to Act, Without Specific Plan (Lifetime) Yes   4. Active Suicidal Ideation with Some Intent to Act, Without Specific Plan (Recent) No   5. Active Suicidal Ideation with Specific Plan and Intent (Lifetime) Yes   5. Active Suicidal Ideation with Specific Plan and Intent (Recent) No   Most Severe Ideation Rating (Lifetime) 4   Frequency (Lifetime) 4   Duration (Lifetime) 4   Controllability (Lifetime) 3   Protective Factors  (Lifetime) 2   Reasons for Ideation (Lifetime) 4   Most Severe Ideation Rating (Past Month) NA   Frequency (Past Month) NA   Duration (Past Month) NA   Controllability (Past Month) NA   Protective Factors (Past Month) NA   Reasons for Ideation (Past Month) NA   Actual Attempt (Lifetime) Yes   Actual Attempt (Past 3 Months) No   Has subject engaged in non-suicidal self-injurious behavior? (Lifetime) No   Has subject engaged in non-suicidal self-injurious behavior? (Past 3 Months) No   Interrupted Attempts (Lifetime) No   Interrupted Attempts (Past 3 Months) No   Aborted or Self-Interrupted Attempt (Lifetime) No   Aborted or Self-Interrupted Attempt (Past 3 Months) No   Preparatory Acts or Behavior (Lifetime) No   Preparatory Acts or Behavior (Past 3 Months) No   Most Recent Attempt Actual Lethality Code 2   Most Lethal Attempt Actual Lethality Code 2   Initial/First Attempt Actual Lethality Code 2       Appearance:   Unable to assess   Eye Contact:   Unable to assess   Psychomotor Behavior: Unable to assess   Attitude:   Cooperative  Friendly Pleasant  Orientation:   All  Speech   Rate / Production: Normal    Volume:  Normal   Mood:    Normal  Affect:    Appropriate   Thought Content:  Clear   Thought Form:  Coherent  Logical   Insight:    Good      Diagnoses:  1. Major depressive disorder, recurrent episode, mild (H)        Collateral Reports Completed:   Not Applicable    Plan: (Homework, other):  Patient was given information about behavioral services and encouraged to schedule a follow up appointment with the clinic [facility] in 3 weeks. Provided self-reflection and thought exercises based on topics covered today for HW. CD Recommendations: No indications of CD issues. Edmundo Hansen, LP     ______________________________________________________________________    [facility] Integrated Behavioral Health Treatment Plan    Client's Name: Shadia Mcgrath  YOB: 1980    Date:11/18/2020    DSM-V Diagnoses: 296.31 (F33.0) Major Depressive Disorder, Recurrent Episode, Mild _  WHODAS: none on file  Clinical Global Impressions  First:  Considering your total clinical experience with this particular patient population, how severe are the patient's symptoms at this time?: 4 (11/20/2020  1:48 PM)      Most recent:  No data recorded        Referral / Collaboration:  Will collaborate with care team as indicated during treatment.    Anticipated number of session or this episode of care: 10+      MeasurableTreatment Goal(s) related to diagnosis / functional impairment(s)  Goal 1:  Patient will experience a reduction in depressive and anxious symptoms, along with a corresponding increase in positive emotion and life satisfaction.    Objective #A: Patient will experience a reduction in depressed mood, will develop more effective coping skills to manage depressive symptoms, will develop healthy cognitive patterns and beliefs, will increase ability to function adaptively and will continue to take medications as prescribed / participate in supportive activities and services    Status: Continued - Date(s): 11/18/2020    Objective #B: Patient will experience a reduction in anxiety, will develop more effective coping skills to manage anxiety symptoms, will develop healthy  cognitive patterns and beliefs and will increase ability to function adaptively  Status: Continued - Date(s): 11/18/2020    Objective #C: Patient will develop better understanding of triggers and coping strategies to stabilize mood  Status: Continued - Date(s):11/18/2020    Goal 2:  Patient will identify and increase engagement in valued activity, i.e. improving social connections/relationships, pursuing occupational goals or personally meaningful pursuits, exploration of meaning in life.     Objective #A: Patient will identify meaningful activity in social, occupational and  personal goals, and increase behavioral activation around these goals   Status: Continued - Date(s): 11/18/2020    Objective #B: Patient will address relationship difficulties in a more adaptive manner  Status: Continued - Date(s): 11/18/2020    Objective #C: Patient will develop coping/problem-solving skills to facilitate more adaptive adjustment and will effectively address problems that interfere with adaptive functioning  Status: Continued - Date(s): 11/18/2020      Possible Therapeutic Intervention(s)  Psycho-education regarding mental health diagnoses and treatment options      Skills training    Explore skills useful to client in current situation.    Skills include assertiveness, communication, conflict management, problem-solving, relaxation, etc.    Solution-Focused Therapy    Explore patterns in patient's relationships and discuss options for new behaviors.    Explore patterns in patient's actions and choices and discuss options for new behaviors.    Cognitive-behavioral Therapy    Discuss common cognitive distortions, identify them in patient's life.    Explore ways to challenge, replace, and act against these cognitions.    Acceptance and Commitment Therapy    Explore and identify important values in patient's life.    Discuss ways to commit to behavioral activation around these values.    Psychodynamic psychotherapy    Discuss  patient's emotional dynamics and issues and how they impact behaviors.    Explore patient's history of relationships and how they impact present behaviors.    Explore how to work with and make changes in these schemas and patterns.    Narrative Therapy    Explore the patient's story of his/her life from his/her perspective.    Explore alternate ways of understanding their experience, identifying exceptions, developing new themes.    Interpersonal Psychotherapy    Explore patterns in relationships that are effective or ineffective at helping patient reach their goals, find satisfying experience.    Discuss new patterns or behaviors to engage in for improved social functioning.    Behavioral Activation    Discuss steps patient can take to become more involved in meaningful activity.    Identify barriers to these activities and explore possible solutions.    Mindfulness-Based Strategies    Discuss skills based on development and application of mindfulness.    Skills drawn from compassion-focused therapy, dialectical behavior therapy, mindfulness-based stress reduction, mindfulness-based cognitive therapy, etc.      We have developed these goals together during our work to this point. Patient has assisted in the development of these goals and has agreed to this treatment plan.       Edmundo Hansen, MARJAN  November 18, 2020

## 2021-01-12 ENCOUNTER — VIRTUAL VISIT (OUTPATIENT)
Dept: ALLERGY | Facility: CLINIC | Age: 41
End: 2021-01-12
Payer: COMMERCIAL

## 2021-01-12 DIAGNOSIS — D83.9 CVID (COMMON VARIABLE IMMUNODEFICIENCY) (H): ICD-10-CM

## 2021-01-12 DIAGNOSIS — D80.1 HYPOGAMMAGLOBULINEMIA (H): Primary | ICD-10-CM

## 2021-01-12 DIAGNOSIS — R05.3 CHRONIC COUGH: ICD-10-CM

## 2021-01-12 DIAGNOSIS — J45.40 MODERATE PERSISTENT ASTHMA WITHOUT COMPLICATION: ICD-10-CM

## 2021-01-12 PROCEDURE — 99214 OFFICE O/P EST MOD 30 MIN: CPT | Mod: 95 | Performed by: DERMATOLOGY

## 2021-01-12 ASSESSMENT — PAIN SCALES - GENERAL: PAINLEVEL: NO PAIN (0)

## 2021-01-12 NOTE — PROGRESS NOTES
Shadia is a 40 year old who is being evaluated via a billable video visit.      How would you like to obtain your AVS? MyChart  If the video visit is dropped, the invitation should be resent by: Send to e-mail at: [email or web address]  Will anyone else be joining your video visit? No    HPI   Review of Systems   Physical Exam   Video-Visit Details    Type of service:  Video Visit    Originating Location (pt. Location): Home    Distant Location (provider location):  [facility] [place]     Platform used for Video Visit: tribr

## 2021-01-12 NOTE — PROGRESS NOTES
[facility] Dermato-Allergy Record     Allergy Problem List:    Specialty Problems        Allergy Diagnoses    CVID (common variable immunodeficiency) (H)              CC:   RECHECK (Shadia is here for a follow up she states that things have gotten worse. )        Encounter Date: Jan 12, 2021    History of Present Illness:  I have reviewed the teledermatology  information and the nursing intake corresponding to this issue. Shadia Mcgrath is a 40 year old female who presents as a teledermatology consult for the following information take directly from the nursing note (kept in this note for patient safety) and video call performed by myself:     Patient has strong coughing and has problems to get rid of the viscous material  Had to discuss with her all the blood results for Dr. Bowers        Patient of Dr. Beyer in October 2020:    Hypogammaglobulinemia (H)  Cough   -For her current cough, I will order azithromycin regular course, and then transition to 1 pill on Mondays, Wednesdays, and Fridays.  -Continue Breo, Pulmicort, and albuterol as is.  -I will reorder T and B cell panel, MBL, pneumococcal antibody levels, HIB antibody level, IgG, and serum IgE for regional aeroallergen panel.  She can have it done in mid/and of November, since pneumococcal antibody levels will be assessed as postvaccination. I asked the patient to provide records of immunization.  -Suggest discussing with lamotrigine prescribing physician an alternative medication. Secondary hypogammaglobulinemia has been described with the use of several antiepileptic agents (carbamazepine, evetiracetam phenytoin, oxcarbazepine, valproate, lamotrigine, and zonisamide).    Evaluation of blood tests from November 2020:  All specific IgE to various perennial and seasonal allergens negatives  Tetanus antibody present  Metabolic panel normal  No signs for monoclonac gammopathy and urine status normal  Complements normal, T and Bcells  within normal limits and IgA, M, and E within normal levels  Hypogammaglobininaemia (441 instead of [id number])    Past Medical History:   Patient Active Problem List   Diagnosis     CVID (common variable immunodeficiency) (H)     Major depressive disorder, recurrent episode, mild (H)     Past Medical History:   Diagnosis Date     Uncomplicated asthma        Allergy History:     Allergies   Allergen Reactions     Amitriptyline Headache     Nortriptyline Nausea and Vomiting     Theophylline Other (See Comments)     Seizures        Aspirin Anxiety and Palpitations       Social History:  The patient works with disabled in health care. Patient has the following hobbies or non-occupational exposure      reports that she has never smoked. She has never used smokeless tobacco. She reports current alcohol use. She reports current drug use. Drug: Marijuana.      Family History:  Family History   Problem Relation Age of Onset     No Known Problems Mother      No Known Problems Father      No Known Problems Sister      No Known Problems Brother        Medications:  Current Outpatient Medications   Medication Sig Dispense Refill     acetaminophen (TYLENOL) 500 MG tablet Take 500 mg by mouth       adapalene (DIFFERIN) 0.1 % external gel        albuterol (PROVENTIL) (2.5 MG/3ML) 0.083% neb solution Inhale 2.5 mg into the lungs       amphetamine-dextroamphetamine (ADDERALL) 20 MG tablet Take 20 mg by mouth       azelastine (ASTELIN) 0.1 % nasal spray Spray 2 sprays into both nostrils 2 times daily as needed for rhinitis 30 mL 3     azithromycin (ZITHROMAX) 250 MG tablet Take one pill on Mondays, Wednesdays, and Fridays. 12 tablet 3     azithromycin (ZITHROMAX) 250 MG tablet Take one tablet on Mondays, Wednesdays, and Fridays. 6 tablet 0     budesonide (PULMICORT FLEXHALER) 180 MCG/ACT inhaler Inhale into the lungs 2 times daily       cefpodoxime (VANTIN) 200 MG tablet Take 1 tablet (200 mg) by mouth 2 times daily (Patient not  taking: Reported on 10/29/2020) 42 tablet 0     Cetirizine HCl (ZYRTEC PO)        chlorhexidine (PERIDEX) 0.12 % solution Swish and spit 15 mL two times a day. Start after you finish your oral antibiotic. Swish for 30 seconds then spit, do NOT swallow       cholecalciferol ( ULTRA STRENGTH) 50 MCG (2000 UT) CAPS Take 2,000 Units by mouth       fluticasone (FLONASE) 50 MCG/ACT nasal spray Spray 1 spray in nostril       Fluticasone Furoate (ARNUITY ELLIPTA IN)        Fluticasone Furoate-Vilanterol (BREO ELLIPTA IN)        gabapentin (NEURONTIN) 300 MG capsule Take 1,200 mg by mouth       ibuprofen (ADVIL/MOTRIN) 800 MG tablet Take 800 mg by mouth       lamoTRIgine (LAMICTAL) 25 MG tablet 25 mg       methocarbamol (ROBAXIN) 500 MG tablet        minoxidil (ROGAINE) 2 % external solution        Montelukast Sodium (SINGULAIR PO) Take 10 mg by mouth       mupirocin (BACTROBAN) 2 % external ointment        omeprazole (PRILOSEC) 20 MG DR capsule Take 20 mg by mouth           Additional comments and observations from review of the patient s chart including the following:    See notes    ROS: Patient generally feeling well today   Physical Examination:  General: Well-appearing, appropriately-developed individual.  - Skin: Examination of the skin according to patients description within the teledermatology was performed.   No active skin lesions  As above in HPI. No additional skin concerns.  - upper respiratory tract: currently no obvious Rhinitis  - lungs: no signs for active and present Asthma/Wheezing or coughing to hear  - eyes: currently no active conjunctivits  - GI system/digestion: currently no problems, no bloating or Diarrhoea    Allergy tests:    Past Allergy tests prick tests with 7 years    Current Allergy tests (or planned)    Order for PRICK TESTS    [] Outpatient  [] Inpatient: [facility]..../ Bed ....      Skin Atopy (atopic dermatitis) [] Yes   [x] No  Contact allergies:   [] Yes   [x]  No  Urticaria/Angioedema  [] Yes   [x] No  Rhinitis/Sinusitis:   [x] Yes   [] No chronic?   [] seasonal [x] perennial            Allergic Asthma:   [x] Yes   [] No  Pets :  [x] Yes   [] No  which?cats  Food Allergy:   [] Yes   [x] No  Drug allergies:   [] Yes   [x] No      Reason for tests (suspected allergy): cough with asthma and postnasal drip = infectious with hypogammaglobulia or allergic??  Known previous allergies: all blood tests negatives    Standardized prick panels  [x] Atopic panel (20 tests)  [] Pediatric Panel (12 tests)  [] Milk, Meat, Eggs, Grains (20 tests)   [] Dust, Epithelia, Feathers (10 tests)  [] Fish, Seafood, Shellfish (17 tests)  [] Nuts, Beans (14 tests)  [] Spice, Vegetable, Fruit (17 tests)  [] Others: ...      [] Patient's own products: ...    DO NOT test if chemical or biological identity is unknown!     always ask from patient the product information and safety sheets (MSDS)     Standardized intradermal tests  [x] Penicillium notatum [x] Aspergillus fumigatus [x] House dust mites  [] Bee venom   [] Wasp venom !!Specific protocol with dilutions!!  [x] Others: cats    [] Patient needs consultation with Allergy team (changes of tests may apply)  [] Tests discussed with Allergy team (can have direct appointment for allergy tests)     Maybe repeat CBC diff if patient is in [facility] and CRP and ESR      Impression/Plan:    # Hypogammaglobulinemia with possible Immunodeficiency    # chronic coughing probably based on asthmoid problem    DDx allergic vs chronic infectious      Patient counseling:  Stop Zyrtec and Benadryl or other antihistamines 1 week before tests  Singulair and all the inhalant medications are OK (do NOT stop them)    Follow-up: in Derm-Allergy clinic for prick and intradermal tests (if possible same day as respiratory consult)      Thank you for the opportunity be involved in the care of this patient.     Staff: Antonella Morataya LPN  and Regla Palm,  RN    _____________________________________________________________________________    Teledermatology information:  - Location of patient: Home  - Patient presented in referral from: self   - Location of teledermatologist:  [facility] [place] (, [facility] [place], [place])  - Reason teledermatology is appropriate:  of National Emergency Regarding Coronavirus disease (COVID 19) Outbreak  - Method of transmission:  Store and Forward and Video ( Invitation sent by:  [employer] and text to cell phone )  - Date of images: na  - Service start time:2:12pm  - Service end time: 2:36pm  - Date of report: 01/12/21      I spent a total of 24 minutes for telemedicine consult with Shadia Mcgrath during today s video meeting. Over 50% of this time was spent counseling the patient and/or coordinating care. Please see Assessment and Plan for details.

## 2021-01-12 NOTE — NURSING NOTE
Allergy Rooming Note    Shadia Mcgrath's goals for this visit include:   Chief Complaint   Patient presents with     RECHECK     Shadia is here for a follow up she states that things have gotten worse.      Antonella Morataya LPN

## 2021-01-12 NOTE — LETTER
1/12/2021         RE: Shadia Mcgrath  [address]  [place] [address]        Dear Colleague,    Thank you for referring your patient, Shadia Mcgrath, to the [facility] [place]. Please see a copy of my visit note below.    Shadia is a 40 year old who is being evaluated via a billable video visit.      How would you like to obtain your AVS? MyChart  If the video visit is dropped, the invitation should be resent by: Send to e-mail at: [email or web address]  Will anyone else be joining your video visit? No    HPI   Review of Systems   Physical Exam   Video-Visit Details    Type of service:  Video Visit    Originating Location (pt. Location): Home    Distant Location (provider location):  [facility] [place]     Platform used for Video Visit: [employer] Dermato-Allergy Record     Allergy Problem List:    Specialty Problems        Allergy Diagnoses    CVID (common variable immunodeficiency) (H)              CC:   RECHECK (Shadia is here for a follow up she states that things have gotten worse. )        Encounter Date: Jan 12, 2021    History of Present Illness:  I have reviewed the teledermatology  information and the nursing intake corresponding to this issue. Shadia Mcgrath is a 40 year old female who presents as a teledermatology consult for the following information take directly from the nursing note (kept in this note for patient safety) and video call performed by myself:     Patient has strong coughing and has problems to get rid of the viscous material  Had to discuss with her all the blood results for Dr. Bowers        Patient of Dr. Beyer in October 2020:    Hypogammaglobulinemia (H)  Cough   -For her current cough, I will order azithromycin regular course, and then transition to 1 pill on Mondays, Wednesdays, and Fridays.  -Continue Breo, Pulmicort, and albuterol as is.  -I will reorder T and B  cell panel, MBL, pneumococcal antibody levels, HIB antibody level, IgG, and serum IgE for regional aeroallergen panel.  She can have it done in mid/and of November, since pneumococcal antibody levels will be assessed as postvaccination. I asked the patient to provide records of immunization.  -Suggest discussing with lamotrigine prescribing physician an alternative medication. Secondary hypogammaglobulinemia has been described with the use of several antiepileptic agents (carbamazepine, evetiracetam phenytoin, oxcarbazepine, valproate, lamotrigine, and zonisamide).    Evaluation of blood tests from November 2020:  All specific IgE to various perennial and seasonal allergens negatives  Tetanus antibody present  Metabolic panel normal  No signs for monoclonac gammopathy and urine status normal  Complements normal, T and Bcells within normal limits and IgA, M, and E within normal levels  Hypogammaglobininaemia (441 instead of [id number])    Past Medical History:   Patient Active Problem List   Diagnosis     CVID (common variable immunodeficiency) (H)     Major depressive disorder, recurrent episode, mild (H)     Past Medical History:   Diagnosis Date     Uncomplicated asthma        Allergy History:     Allergies   Allergen Reactions     Amitriptyline Headache     Nortriptyline Nausea and Vomiting     Theophylline Other (See Comments)     Seizures        Aspirin Anxiety and Palpitations       Social History:  The patient works with disabled in health care. Patient has the following hobbies or non-occupational exposure      reports that she has never smoked. She has never used smokeless tobacco. She reports current alcohol use. She reports current drug use. Drug: Marijuana.      Family History:  Family History   Problem Relation Age of Onset     No Known Problems Mother      No Known Problems Father      No Known Problems Sister      No Known Problems Brother        Medications:  Current Outpatient Medications    Medication Sig Dispense Refill     acetaminophen (TYLENOL) 500 MG tablet Take 500 mg by mouth       adapalene (DIFFERIN) 0.1 % external gel        albuterol (PROVENTIL) (2.5 MG/3ML) 0.083% neb solution Inhale 2.5 mg into the lungs       amphetamine-dextroamphetamine (ADDERALL) 20 MG tablet Take 20 mg by mouth       azelastine (ASTELIN) 0.1 % nasal spray Spray 2 sprays into both nostrils 2 times daily as needed for rhinitis 30 mL 3     azithromycin (ZITHROMAX) 250 MG tablet Take one pill on Mondays, Wednesdays, and Fridays. 12 tablet 3     azithromycin (ZITHROMAX) 250 MG tablet Take one tablet on Mondays, Wednesdays, and Fridays. 6 tablet 0     budesonide (PULMICORT FLEXHALER) 180 MCG/ACT inhaler Inhale into the lungs 2 times daily       cefpodoxime (VANTIN) 200 MG tablet Take 1 tablet (200 mg) by mouth 2 times daily (Patient not taking: Reported on 10/29/2020) 42 tablet 0     Cetirizine HCl (ZYRTEC PO)        chlorhexidine (PERIDEX) 0.12 % solution Swish and spit 15 mL two times a day. Start after you finish your oral antibiotic. Swish for 30 seconds then spit, do NOT swallow       cholecalciferol ( ULTRA STRENGTH) 50 MCG (2000 UT) CAPS Take 2,000 Units by mouth       fluticasone (FLONASE) 50 MCG/ACT nasal spray Spray 1 spray in nostril       Fluticasone Furoate (ARNUITY ELLIPTA IN)        Fluticasone Furoate-Vilanterol (BREO ELLIPTA IN)        gabapentin (NEURONTIN) 300 MG capsule Take 1,200 mg by mouth       ibuprofen (ADVIL/MOTRIN) 800 MG tablet Take 800 mg by mouth       lamoTRIgine (LAMICTAL) 25 MG tablet 25 mg       methocarbamol (ROBAXIN) 500 MG tablet        minoxidil (ROGAINE) 2 % external solution        Montelukast Sodium (SINGULAIR PO) Take 10 mg by mouth       mupirocin (BACTROBAN) 2 % external ointment        omeprazole (PRILOSEC) 20 MG DR capsule Take 20 mg by mouth           Additional comments and observations from review of the patient s chart including the following:    See notes    ROS:  Patient generally feeling well today   Physical Examination:  General: Well-appearing, appropriately-developed individual.  - Skin: Examination of the skin according to patients description within the teledermatology was performed.   No active skin lesions  As above in HPI. No additional skin concerns.  - upper respiratory tract: currently no obvious Rhinitis  - lungs: no signs for active and present Asthma/Wheezing or coughing to hear  - eyes: currently no active conjunctivits  - GI system/digestion: currently no problems, no bloating or Diarrhoea    Allergy tests:    Past Allergy tests prick tests with 7 years    Current Allergy tests (or planned)    Order for PRICK TESTS    [] Outpatient  [] Inpatient: [facility]..../ Bed ....      Skin Atopy (atopic dermatitis) [] Yes   [x] No  Contact allergies:   [] Yes   [x] No  Urticaria/Angioedema  [] Yes   [x] No  Rhinitis/Sinusitis:   [x] Yes   [] No chronic?   [] seasonal [x] perennial            Allergic Asthma:   [x] Yes   [] No  Pets :  [x] Yes   [] No  which?cats  Food Allergy:   [] Yes   [x] No  Drug allergies:   [] Yes   [x] No      Reason for tests (suspected allergy): cough with asthma and postnasal drip = infectious with hypogammaglobulia or allergic??  Known previous allergies: all blood tests negatives    Standardized prick panels  [x] Atopic panel (20 tests)  [] Pediatric Panel (12 tests)  [] Milk, Meat, Eggs, Grains (20 tests)   [] Dust, Epithelia, Feathers (10 tests)  [] Fish, Seafood, Shellfish (17 tests)  [] Nuts, Beans (14 tests)  [] Spice, Vegetable, Fruit (17 tests)  [] Others: ...      [] Patient's own products: ...    DO NOT test if chemical or biological identity is unknown!     always ask from patient the product information and safety sheets (MSDS)     Standardized intradermal tests  [x] Penicillium notatum [x] Aspergillus fumigatus [x] House dust mites  [] Bee venom   [] Wasp venom !!Specific protocol with dilutions!!  [x] Others: cats    [] Patient  needs consultation with Allergy team (changes of tests may apply)  [] Tests discussed with Allergy team (can have direct appointment for allergy tests)     Maybe repeat CBC diff if patient is in [facility] and CRP and ESR      Impression/Plan:    # Hypogammaglobulinemia with possible Immunodeficiency    # chronic coughing probably based on asthmoid problem    DDx allergic vs chronic infectious      Patient counseling:  Stop Zyrtec and Benadryl or other antihistamines 1 week before tests  Singulair and all the inhalant medications are OK (do NOT stop them)    Follow-up: in Derm-Allergy clinic for prick and intradermal tests (if possible same day as respiratory consult)      Thank you for the opportunity be involved in the care of this patient.     Staff: Antonella Morataya LPN  and Regla Palm RN    _____________________________________________________________________________    Teledermatology information:  - Location of patient: Home  - Patient presented in referral from: self   - Location of teledermatologist:  [facility] [place] (, [place], [place])  - Reason teledermatology is appropriate:  of National Emergency Regarding Coronavirus disease (COVID 19) Outbreak  - Method of transmission:  Store and Forward and Video ( Invitation sent by:  [email or web address] and text to cell phone )  - Date of images: na  - Service start time:2:12pm  - Service end time: 2:36pm  - Date of report: 01/12/21      I spent a total of 24 minutes for telemedicine consult with Shadia Mcgrath during today s video meeting. Over 50% of this time was spent counseling the patient and/or coordinating care. Please see Assessment and Plan for details.       Again, thank you for allowing me to participate in the care of your patient.        Sincerely,        Yakov Bautista MD

## 2021-01-13 DIAGNOSIS — J45.909 ASTHMA: Primary | ICD-10-CM

## 2021-01-15 ENCOUNTER — HEALTH MAINTENANCE LETTER (OUTPATIENT)
Age: 41
End: 2021-01-15

## 2021-01-20 DIAGNOSIS — J45.909 ASTHMA: ICD-10-CM

## 2021-01-20 PROCEDURE — 94729 DIFFUSING CAPACITY: CPT | Performed by: INTERNAL MEDICINE

## 2021-01-20 PROCEDURE — 94375 RESPIRATORY FLOW VOLUME LOOP: CPT | Performed by: INTERNAL MEDICINE

## 2021-01-20 PROCEDURE — 94726 PLETHYSMOGRAPHY LUNG VOLUMES: CPT | Performed by: INTERNAL MEDICINE

## 2021-01-21 LAB
DLCOUNC-%PRED-PRE: 130 %
DLCOUNC-PRE: 31.32 ML/MIN/MMHG
DLCOUNC-PRED: 23.98 ML/MIN/MMHG
ERV-%PRED-PRE: 39 %
ERV-PRE: 0.27 L
ERV-PRED: 0.68 L
EXPTIME-PRE: 7.76 SEC
FEF2575-%PRED-PRE: 77 %
FEF2575-PRE: 2.55 L/SEC
FEF2575-PRED: 3.3 L/SEC
FEFMAX-%PRED-PRE: 98 %
FEFMAX-PRE: 7.3 L/SEC
FEFMAX-PRED: 7.41 L/SEC
FEV1-%PRED-PRE: 90 %
FEV1-PRE: 2.95 L
FEV1FEV6-PRE: 78 %
FEV1FEV6-PRED: 84 %
FEV1FVC-PRE: 78 %
FEV1FVC-PRED: 81 %
FEV1SVC-PRE: 80 %
FEV1SVC-PRED: 81 %
FIFMAX-PRE: 5.58 L/SEC
FRCPLETH-%PRED-PRE: 81 %
FRCPLETH-PRE: 2.32 L
FRCPLETH-PRED: 2.85 L
FVC-%PRED-PRE: 93 %
FVC-PRE: 3.77 L
FVC-PRED: 4.04 L
IC-%PRED-PRE: 101 %
IC-PRE: 3.41 L
IC-PRED: 3.35 L
RVPLETH-%PRED-PRE: 116 %
RVPLETH-PRE: 2.05 L
RVPLETH-PRED: 1.75 L
TLCPLETH-%PRED-PRE: 105 %
TLCPLETH-PRE: 5.72 L
TLCPLETH-PRED: 5.44 L
VA-%PRED-PRE: 95 %
VA-PRE: 5.29 L
VC-%PRED-PRE: 91 %
VC-PRE: 3.68 L
VC-PRED: 4.03 L

## 2021-01-25 ENCOUNTER — VIRTUAL VISIT (OUTPATIENT)
Dept: PULMONOLOGY | Facility: CLINIC | Age: 41
End: 2021-01-25
Payer: COMMERCIAL

## 2021-01-25 DIAGNOSIS — J45.909 SEVERE ASTHMA, UNSPECIFIED WHETHER COMPLICATED, UNSPECIFIED WHETHER PERSISTENT: ICD-10-CM

## 2021-01-25 DIAGNOSIS — Z87.01 H/O RECURRENT PNEUMONIA: ICD-10-CM

## 2021-01-25 DIAGNOSIS — D80.1 HYPOGAMMAGLOBULINEMIA (H): Primary | ICD-10-CM

## 2021-01-25 PROCEDURE — 99214 OFFICE O/P EST MOD 30 MIN: CPT | Mod: 95

## 2021-01-25 NOTE — LETTER
1/25/2021         RE: Shadia Mcgrath  [address]  [place] [address]        Dear Colleague,    Thank you for referring your patient, Shadia Mcgrath, to the [facility]. Please see a copy of my visit note below.    Shadia is a 40 year old who is being evaluated via a billable video visit.      How would you like to obtain your AVS? [employer]  If the video visit is dropped, the invitation should be resent by: Other e-mail: [employer]  Will anyone else be joining your video visit? No      Video Start Time: 7am  Video-Visit Details    Type of service:  Video Visit    Video End Time:7:35    Originating Location (pt. Location): Home    Distant Location (provider location):  [facility]     Platform used for Video Visit: [facility]  Pulmonary Medicine  Visit Clinic Note  January 25, 2021         ASSESSMENT & PLAN     History of asthma  Hypogammaglobulinemia  History of recurrent pneumonias    It may be too early to tell for sure, but the change in her home situation may have improved her symptoms significantly.  Right now, her cough is still present but minimal.  She has not needed any recent antibiotics to treat a pneumonia.  Her spirometry is improved as is her diffusion capacity when compared to the year prior.  If the change in location was not what helped, then it could be the chronic azithromycin that was started.    She is on a lot of inhaled corticosteroid right now.  I recommended that she stop Pulmicort.  She can continue Breo.  I do wonder if the high-dose inhaled steroids are actually promoting recurrent pneumonias for her in the setting of having low immunoglobulin levels.    She also mentioned that the allergist discussed stopping Lamictal.  She is going to look into what a good mood stabilizer would be if this is stopped.  Lamictal  apparently has been reported to cause a hypogammaglobulinemia.    I will have her follow-up in my clinic in 3 months to see how her symptoms are doing off of Pulmicort.    Kleber Guy MD        Today's visit note:     Chief Complaint: Shadia Mcgrath is a 39 year old year old female who is being seen for RECHECK    HISTORY OF PRESENT ILLNESS:    This is a 40-year-old female with a history of asthma and recurrent pneumonias who is presenting to the pulmonary clinic today for a follow-up visit.    I had referred her to the allergy clinic for work-up of her hypogammaglobulinemia.  With recurrent sinus infections and pneumonias, there is a possibility for common variable immune deficiency.  She is still being worked up for this, and needs to get more blood work performed to assess her immune system.    Fortunately, her symptoms are much better than what they were a year ago.  She moved to a different home in the middle of September, and she thinks her symptoms have greatly improved since then.  She was diagnosed with COVID-19 in October, but despite that her pulmonary symptoms have still gotten better.  She still has a bit of a dry cough, better her shortness of breath is better and she has not required recurrent rounds of antibiotics.  Her allergist did start her on azithromycin 3 times a week in November.           Past Medical and Surgical History:     Past Medical History:  Past Medical History:   Diagnosis Date     ADHD (attention deficit hyperactivity disorder)     Asthma   Persistent asthma, steroid dependent     Bilateral chronic knee pain     Bipolar 1 disorder (HCC)     Delayed emergence from general anesthesia     Depression     Fibromyalgia     GERD (gastroesophageal reflux disease)     HCD (health care directive) 10/10/2017     DAPHNE (obstructive sleep apnea)     Osteomyelitis (HCC)     Osteopenia     Seasonal allergic rhinitis     Seizures (HCC)   ? partial seizures / none for somr time. due brain  trauma obtained in 2016     Vitamin D deficiency     Past Surgical History:   Procedure Laterality Date     GASTRIC FUNDOPLICATION 1998     KNEE SURGERY Right 4/2009     KNEE SURGERY Left 12/2004     REMOVAL GALLBLADDER     SIMON-EN-Y GASTRIC BYPASS 12/06     SINUS SURGERY     TOTAL KNEE ARTHROPLASTY Left 10/10/2017   Procedure: LEFT ARTHROPLASTY TOTAL KNEE; Surgeon: Yakov Choi MD; Location: [facility] MAIN OR         Family History:     Family History   Problem Relation Age of Onset     No Known Problems Mother      No Known Problems Father      No Known Problems Sister      No Known Problems Brother             Social History:     Social History     Socioeconomic History     Marital status: Single     Spouse name: Not on file     Number of children: Not on file     Years of education: Not on file     Highest education level: Not on file   Occupational History     Not on file   Social Needs     Financial resource strain: Not on file     Food insecurity     Worry: Not on file     Inability: Not on file     Transportation needs     Medical: Not on file     Non-medical: Not on file   Tobacco Use     Smoking status: Never Smoker     Smokeless tobacco: Never Used   Substance and Sexual Activity     Alcohol use: Yes     Comment: occassional      Drug use: Yes     Types: Marijuana     Comment: rare     Sexual activity: Not on file   Lifestyle     Physical activity     Days per week: Not on file     Minutes per session: Not on file     Stress: Not on file   Relationships     Social connections     Talks on phone: Not on file     Gets together: Not on file     Attends Alevism service: Not on file     Active member of club or organization: Not on file     Attends meetings of clubs or organizations: Not on file     Relationship status: Not on file     Intimate partner violence     Fear of current or ex partner: Not on file     Emotionally abused: Not on file     Physically abused: Not on file     Forced sexual  activity: Not on file   Other Topics Concern     Parent/sibling w/ CABG, MI or angioplasty before 65F 55M? Not Asked   Social History Narrative    10/29/20        ENVIRONMENTAL HISTORY: [place] The home is heated with a forced air. They does have central air conditioning. The patient's bedroom is furnished with hard ryan in bedroom.  Pets inside the house include 3 cat(s). There is no history of cockroach or mice infestation. There is/are 0 smokers in the house.  The house does not have a damp basement.             Medications:     Current Outpatient Medications   Medication     acetaminophen (TYLENOL) 500 MG tablet     adapalene (DIFFERIN) 0.1 % external gel     albuterol (PROVENTIL) (2.5 MG/3ML) 0.083% neb solution     amphetamine-dextroamphetamine (ADDERALL) 20 MG tablet     azelastine (ASTELIN) 0.1 % nasal spray     azithromycin (ZITHROMAX) 250 MG tablet     azithromycin (ZITHROMAX) 250 MG tablet     budesonide (PULMICORT FLEXHALER) 180 MCG/ACT inhaler     Cetirizine HCl (ZYRTEC PO)     chlorhexidine (PERIDEX) 0.12 % solution     cholecalciferol ( ULTRA STRENGTH) 50 MCG (2000 UT) CAPS     fluticasone (FLONASE) 50 MCG/ACT nasal spray     Fluticasone Furoate (ARNUITY ELLIPTA IN)     Fluticasone Furoate-Vilanterol (BREO ELLIPTA IN)     gabapentin (NEURONTIN) 300 MG capsule     ibuprofen (ADVIL/MOTRIN) 800 MG tablet     lamoTRIgine (LAMICTAL) 25 MG tablet     methocarbamol (ROBAXIN) 500 MG tablet     minoxidil (ROGAINE) 2 % external solution     Montelukast Sodium (SINGULAIR PO)     mupirocin (BACTROBAN) 2 % external ointment     omeprazole (PRILOSEC) 20 MG DR capsule     cefpodoxime (VANTIN) 200 MG tablet     No current facility-administered medications for this visit.             Review of Systems:     A complete review of systems was otherwise negative except as noted in the HPI.    PHYSICAL EXAM:  There were no vitals taken for this visit.     Video visit  exam shows a healthy-appearing female.  Speaking full sentences and in no respiratory distress.  No audible wheezing or coughing is witnessed.         Data:   All laboratory and imaging data reviewed.      PFT:   Pulmonary function testing performed on January 20, 2021 shows an FEV1/FVC ratio of 0.78.  Her FVC is 3.77 L which is 93% predicted, and the FEV1 is 2.95 L which is 90% predicted.  The residual volume is 116% predicted and the total lung capacity is 105% predicted.  Diffusion capacity is 130% predicted.       PFT Interpretation:  No airflow obstruction.  Normal diffusion capacity.  There is improved vital capacity and FEV1 when compared to spirometry in September 2019.    Chest CT:   9/2019: tree-in-bud opacities. Left lower lobe consolidative changes.        Again, thank you for allowing me to participate in the care of your patient.        Sincerely,        Simone Guy MD

## 2021-01-25 NOTE — PROGRESS NOTES
Shadia is a 40 year old who is being evaluated via a billable video visit.      How would you like to obtain your AVS? MyChart  If the video visit is dropped, the invitation should be resent by: Other e-mail: [place]  Will anyone else be joining your video visit? No      Video Start Time: 7am  Video-Visit Details    Type of service:  Video Visit    Video End Time:7:35    Originating Location (pt. Location): Home    Distant Location (provider location):  [facility]     Platform used for Video Visit: [facility]  Pulmonary Medicine  Visit Clinic Note  January 25, 2021         ASSESSMENT & PLAN     History of asthma  Hypogammaglobulinemia  History of recurrent pneumonias    It may be too early to tell for sure, but the change in her home situation may have improved her symptoms significantly.  Right now, her cough is still present but minimal.  She has not needed any recent antibiotics to treat a pneumonia.  Her spirometry is improved as is her diffusion capacity when compared to the year prior.  If the change in location was not what helped, then it could be the chronic azithromycin that was started.    She is on a lot of inhaled corticosteroid right now.  I recommended that she stop Pulmicort.  She can continue Breo.  I do wonder if the high-dose inhaled steroids are actually promoting recurrent pneumonias for her in the setting of having low immunoglobulin levels.    She also mentioned that the allergist discussed stopping Lamictal.  She is going to look into what a good mood stabilizer would be if this is stopped.  Lamictal apparently has been reported to cause a hypogammaglobulinemia.    I will have her follow-up in my clinic in 3 months to see how her symptoms are doing off of Pulmicort.    Kleber Guy MD        Today's visit note:     Chief Complaint: Shadia Mcgrath is a 39 year old year old female who is being  seen for RECHECK    HISTORY OF PRESENT ILLNESS:    This is a 40-year-old female with a history of asthma and recurrent pneumonias who is presenting to the pulmonary clinic today for a follow-up visit.    I had referred her to the allergy clinic for work-up of her hypogammaglobulinemia.  With recurrent sinus infections and pneumonias, there is a possibility for common variable immune deficiency.  She is still being worked up for this, and needs to get more blood work performed to assess her immune system.    Fortunately, her symptoms are much better than what they were a year ago.  She moved to a different home in the middle of September, and she thinks her symptoms have greatly improved since then.  She was diagnosed with COVID-19 in October, but despite that her pulmonary symptoms have still gotten better.  She still has a bit of a dry cough, better her shortness of breath is better and she has not required recurrent rounds of antibiotics.  Her allergist did start her on azithromycin 3 times a week in November.           Past Medical and Surgical History:     Past Medical History:  Past Medical History:   Diagnosis Date     ADHD (attention deficit hyperactivity disorder)     Asthma   Persistent asthma, steroid dependent     Bilateral chronic knee pain     Bipolar 1 disorder (HCC)     Delayed emergence from general anesthesia     Depression     Fibromyalgia     GERD (gastroesophageal reflux disease)     HCD (health care directive) 10/10/2017     DAPHNE (obstructive sleep apnea)     Osteomyelitis (HCC)     Osteopenia     Seasonal allergic rhinitis     Seizures (HCC)   ? partial seizures / none for somr time. due brain trauma obtained in 2016     Vitamin D deficiency     Past Surgical History:   Procedure Laterality Date     GASTRIC FUNDOPLICATION 1998     KNEE SURGERY Right 4/2009     KNEE SURGERY Left 12/2004     REMOVAL GALLBLADDER     SIMON-EN-Y GASTRIC BYPASS 12/06     SINUS SURGERY     TOTAL KNEE ARTHROPLASTY Left  10/10/2017   Procedure: LEFT ARTHROPLASTY TOTAL KNEE; Surgeon: Yakov Choi MD; Location: [facility] MAIN OR         Family History:     Family History   Problem Relation Age of Onset     No Known Problems Mother      No Known Problems Father      No Known Problems Sister      No Known Problems Brother             Social History:     Social History     Socioeconomic History     Marital status: Single     Spouse name: Not on file     Number of children: Not on file     Years of education: Not on file     Highest education level: Not on file   Occupational History     Not on file   Social Needs     Financial resource strain: Not on file     Food insecurity     Worry: Not on file     Inability: Not on file     Transportation needs     Medical: Not on file     Non-medical: Not on file   Tobacco Use     Smoking status: Never Smoker     Smokeless tobacco: Never Used   Substance and Sexual Activity     Alcohol use: Yes     Comment: occassional      Drug use: Yes     Types: Marijuana     Comment: rare     Sexual activity: Not on file   Lifestyle     Physical activity     Days per week: Not on file     Minutes per session: Not on file     Stress: Not on file   Relationships     Social connections     Talks on phone: Not on file     Gets together: Not on file     Attends Yarsani service: Not on file     Active member of club or organization: Not on file     Attends meetings of clubs or organizations: Not on file     Relationship status: Not on file     Intimate partner violence     Fear of current or ex partner: Not on file     Emotionally abused: Not on file     Physically abused: Not on file     Forced sexual activity: Not on file   Other Topics Concern     Parent/sibling w/ CABG, MI or angioplasty before 65F 55M? Not Asked   Social History Narrative    10/29/20        ENVIRONMENTAL HISTORY: [place] The home is heated with a forced air. They does have central air  conditioning. The patient's bedroom is furnished with hard ryan in bedroom.  Pets inside the house include 3 cat(s). There is no history of cockroach or mice infestation. There is/are 0 smokers in the house.  The house does not have a damp basement.             Medications:     Current Outpatient Medications   Medication     acetaminophen (TYLENOL) 500 MG tablet     adapalene (DIFFERIN) 0.1 % external gel     albuterol (PROVENTIL) (2.5 MG/3ML) 0.083% neb solution     amphetamine-dextroamphetamine (ADDERALL) 20 MG tablet     azelastine (ASTELIN) 0.1 % nasal spray     azithromycin (ZITHROMAX) 250 MG tablet     azithromycin (ZITHROMAX) 250 MG tablet     budesonide (PULMICORT FLEXHALER) 180 MCG/ACT inhaler     Cetirizine HCl (ZYRTEC PO)     chlorhexidine (PERIDEX) 0.12 % solution     cholecalciferol ( ULTRA STRENGTH) 50 MCG (2000 UT) CAPS     fluticasone (FLONASE) 50 MCG/ACT nasal spray     Fluticasone Furoate (ARNUITY ELLIPTA IN)     Fluticasone Furoate-Vilanterol (BREO ELLIPTA IN)     gabapentin (NEURONTIN) 300 MG capsule     ibuprofen (ADVIL/MOTRIN) 800 MG tablet     lamoTRIgine (LAMICTAL) 25 MG tablet     methocarbamol (ROBAXIN) 500 MG tablet     minoxidil (ROGAINE) 2 % external solution     Montelukast Sodium (SINGULAIR PO)     mupirocin (BACTROBAN) 2 % external ointment     omeprazole (PRILOSEC) 20 MG DR capsule     cefpodoxime (VANTIN) 200 MG tablet     No current facility-administered medications for this visit.             Review of Systems:     A complete review of systems was otherwise negative except as noted in the HPI.    PHYSICAL EXAM:  There were no vitals taken for this visit.     Video visit exam shows a healthy-appearing female.  Speaking full sentences and in no respiratory distress.  No audible wheezing or coughing is witnessed.         Data:   All laboratory and imaging data reviewed.      PFT:   Pulmonary function testing performed on January 20, 2021 shows an FEV1/FVC ratio of 0.78.  Her  FVC is 3.77 L which is 93% predicted, and the FEV1 is 2.95 L which is 90% predicted.  The residual volume is 116% predicted and the total lung capacity is 105% predicted.  Diffusion capacity is 130% predicted.       PFT Interpretation:  No airflow obstruction.  Normal diffusion capacity.  There is improved vital capacity and FEV1 when compared to spirometry in September 2019.    Chest CT:   9/2019: tree-in-bud opacities. Left lower lobe consolidative changes.

## 2021-01-27 ENCOUNTER — VIRTUAL VISIT (OUTPATIENT)
Dept: BEHAVIORAL HEALTH | Facility: CLINIC | Age: 41
End: 2021-01-27
Payer: COMMERCIAL

## 2021-01-27 DIAGNOSIS — F33.0 MAJOR DEPRESSIVE DISORDER, RECURRENT EPISODE, MILD (H): Primary | ICD-10-CM

## 2021-01-27 PROCEDURE — 90834 PSYTX W PT 45 MINUTES: CPT | Mod: 95 | Performed by: PSYCHOLOGIST

## 2021-01-27 NOTE — PROGRESS NOTES
"[facility]: Integrated Behavioral Health  January 27, 2021      Behavioral Health Clinician Progress Note    Patient Name: Shadia Mcgrath           Service Type: Phone Visit      Service Location:  phone visit      Session Start Time: 3:12  Session End Time: 3:51      Session Length: 38 - 52      Attendees: Patient     Visit Activities (Refresh list every visit): Phone Encounter    Shadia Mcgrath is a 40 year old female who is being evaluated via a telephone visit.      The patient has been notified of the following:     \"We have found that certain health care needs can be provided without the need for a face to face visit.  This service lets us provide the care you need with a short phone conversation.      I will have full access to your [facility] medical record during this entire phone call.   I will be taking notes for your medical record.     Since this is like an office visit, we will bill your insurance company for this service.  Please check with your medical insurance if this type of telephone visit/virtual care is covered.  You may be responsible for the cost of this service if insurance coverage is denied.      There are potential benefits and risks of telephone visits (e.g. limits to patient confidentiality) that differ from in-person visits.?  Confidentiality still applies for telephone services, and nobody will record the visit.  It is important to be in a quiet, private space that is free of distractions (including cell phone or other devices) during the visit.??     If during the course of the call I believe a telephone visit is not appropriate, you will not be charged for this service\"    Consent has been obtained for this service by care team member: yes.    Diagnostic Assessment Date: 10/5/2020  Treatment Plan Review Date: 2/18/2021  See Flowsheets for today's PHQ-9 and ZACH-7 results  Previous PHQ-9:   PHQ-9 SCORE 8/27/2020   PHQ-9 Total Score 13 " "    Previous ZACH-7: No flowsheet data found.     MELVIN LEVEL:  No flowsheet data found.    DATA  Extended Session (60+ minutes): No  Interactive Complexity: No  Crisis: No    Treatment Objective(s) Addressed in This Session:  Target Behavior(s): disease management/lifestyle changes related to psychosocial and relational stress    Depressed Mood: Decrease frequency and intensity of feeling down, depressed, hopeless  Relationship Problems: will address relationship difficulties in a more adaptive manner    Current Stressors / Issues:  Shadia shared at the onset of our call that she was having a \"lousy\" day. We talked about what has been stressful for her lately, chief of which included her recent conversations with her daughter via text. Shadia shared information about her history with the custody of her two daughters and the history of problems their family faced. Shadia indicated she wants to see her daughters more often, though her ex  has sole custody of them. She shared with me a text she sent her ex about asking for more time with them. We talked about why she seemed to apologize profusely in her message and we talked about potentially more adaptive ways she could ask for more time with her children without apologizing excessively for past events. We talked about ways to keep bids for more time present-focused and to not bring up past events to avoid unneeded conflict. Throughout the session, we practiced/rehearsed a few healthy communication behaviors she could use to help achieve her goals with others.     Supportive and solution-focused counseling was our emphasis today.     Progress on Treatment Objective(s) / Homework:  Satisfactory progress - CONTEMPLATION (Considering change and yet undecided); Intervened by assessing the negative and positive thinking (ambivalence) about behavior change    Supportive counseling insight-oriented counseling with emphasis on guided discovery and interpersonal " conflict resolution - focused on improving adaptive responses to conflict with others.     Care Plan review completed: No    Medication Review:  No changes to current psychiatric medication(s)    Medication Compliance:  Yes    Changes in Health Issues:   None reported    Chemical Use Review:   Substance Use: Chemical use reviewed, no active concerns identified      Tobacco Use: No current tobacco use.      Assessment: Current Emotional / Mental Status (status of significant symptoms):  Risk status (Self / Other harm or suicidal ideation)  Patient has had a history of suicidal ideation: SI as a youth only and suicide attempts: one prior attempt at age 15; none recent  Patient denies current fears or concerns for personal safety.     Patient denies current or recent suicidal ideation or behaviors.  Patient denies current or recent homicidal ideation or behaviors.  Patient denies current or recent self injurious behavior or ideation.  Patient denies other safety concerns.     A safety and risk management plan has not been developed at this time, however patient was encouraged to call [place] [phone] should there be a change in any of these risk factors.    [place] Suicide Severity Rating Scale (Lifetime/Recent)  [place] Suicide Severity Rating (Lifetime/Recent) 9/21/2020   1. Wish to be Dead (Lifetime) Yes   1. Wish to be Dead (Recent) No   2. Non-Specific Active Suicidal Thoughts (Lifetime) Yes   2. Non-Specific Active Suicidal Thoughts (Recent) No   3. Active Suicidal Ideation with any Methods (Not Plan) Without Intent to Act (Lifetime) Yes   3. Active Suicidal Ideation with any Methods (Not Plan) Without Intent to Act (Recent) No   4. Active Suicidal Ideation with Some Intent to Act, Without Specific Plan (Lifetime) Yes   4. Active Suicidal Ideation with Some Intent to Act, Without Specific Plan (Recent) No   5. Active Suicidal Ideation with Specific Plan and Intent (Lifetime) Yes   5. Active Suicidal Ideation  with Specific Plan and Intent (Recent) No   Most Severe Ideation Rating (Lifetime) 4   Frequency (Lifetime) 4   Duration (Lifetime) 4   Controllability (Lifetime) 3   Protective Factors  (Lifetime) 2   Reasons for Ideation (Lifetime) 4   Most Severe Ideation Rating (Past Month) NA   Frequency (Past Month) NA   Duration (Past Month) NA   Controllability (Past Month) NA   Protective Factors (Past Month) NA   Reasons for Ideation (Past Month) NA   Actual Attempt (Lifetime) Yes   Actual Attempt (Past 3 Months) No   Has subject engaged in non-suicidal self-injurious behavior? (Lifetime) No   Has subject engaged in non-suicidal self-injurious behavior? (Past 3 Months) No   Interrupted Attempts (Lifetime) No   Interrupted Attempts (Past 3 Months) No   Aborted or Self-Interrupted Attempt (Lifetime) No   Aborted or Self-Interrupted Attempt (Past 3 Months) No   Preparatory Acts or Behavior (Lifetime) No   Preparatory Acts or Behavior (Past 3 Months) No   Most Recent Attempt Actual Lethality Code 2   Most Lethal Attempt Actual Lethality Code 2   Initial/First Attempt Actual Lethality Code 2       Appearance:   Unable to assess   Eye Contact:   Unable to assess   Psychomotor Behavior: Unable to assess   Attitude:   Cooperative  Friendly Pleasant  Orientation:   All  Speech   Rate / Production: Normal    Volume:  Normal   Mood:    Normal  Affect:    Appropriate   Thought Content:  Clear   Thought Form:  Coherent  Logical   Insight:    Good     Diagnoses:  1. Major depressive disorder, recurrent episode, mild (H)        Collateral Reports Completed:   Not Applicable    Plan: (Homework, other):  Patient was given information about behavioral services and encouraged to schedule a follow up appointment with the clinic [facility] in 3 weeks. Provided self-reflection and thought exercises based on topics covered today for HW. CD Recommendations: No indications of CD issues. Edmundo Hansen, MARJAN      ______________________________________________________________________    [facility] Integrated Behavioral Health Treatment Plan    Client's Name: Shadia Mcgrath  YOB: 1980    Date:11/18/2020    DSM-V Diagnoses: 296.31 (F33.0) Major Depressive Disorder, Recurrent Episode, Mild _  WHODAS: none on file  Clinical Global Impressions  First:  Considering your total clinical experience with this particular patient population, how severe are the patient's symptoms at this time?: 4 (11/20/2020  1:48 PM)      Most recent:  No data recorded        Referral / Collaboration:  Will collaborate with care team as indicated during treatment.    Anticipated number of session or this episode of care: 10+      MeasurableTreatment Goal(s) related to diagnosis / functional impairment(s)  Goal 1:  Patient will experience a reduction in depressive and anxious symptoms, along with a corresponding increase in positive emotion and life satisfaction.    Objective #A: Patient will experience a reduction in depressed mood, will develop more effective coping skills to manage depressive symptoms, will develop healthy cognitive patterns and beliefs, will increase ability to function adaptively and will continue to take medications as prescribed / participate in supportive activities and services    Status: Continued - Date(s): 11/18/2020    Objective #B: Patient will experience a reduction in anxiety, will develop more effective coping skills to manage anxiety symptoms, will develop healthy cognitive patterns and beliefs and will increase ability to function adaptively  Status: Continued - Date(s): 11/18/2020    Objective #C: Patient will develop better understanding of triggers and coping strategies to stabilize mood  Status: Continued - Date(s):11/18/2020    Goal 2:  Patient will identify and increase engagement in valued activity, i.e. improving social connections/relationships, pursuing occupational goals or personally  meaningful pursuits, exploration of meaning in life.     Objective #A: Patient will identify meaningful activity in social, occupational and  personal goals, and increase behavioral activation around these goals   Status: Continued - Date(s): 11/18/2020    Objective #B: Patient will address relationship difficulties in a more adaptive manner  Status: Continued - Date(s): 11/18/2020    Objective #C: Patient will develop coping/problem-solving skills to facilitate more adaptive adjustment and will effectively address problems that interfere with adaptive functioning  Status: Continued - Date(s): 11/18/2020      Possible Therapeutic Intervention(s)  Psycho-education regarding mental health diagnoses and treatment options      Skills training    Explore skills useful to client in current situation.    Skills include assertiveness, communication, conflict management, problem-solving, relaxation, etc.    Solution-Focused Therapy    Explore patterns in patient's relationships and discuss options for new behaviors.    Explore patterns in patient's actions and choices and discuss options for new behaviors.    Cognitive-behavioral Therapy    Discuss common cognitive distortions, identify them in patient's life.    Explore ways to challenge, replace, and act against these cognitions.    Acceptance and Commitment Therapy    Explore and identify important values in patient's life.    Discuss ways to commit to behavioral activation around these values.    Psychodynamic psychotherapy    Discuss patient's emotional dynamics and issues and how they impact behaviors.    Explore patient's history of relationships and how they impact present behaviors.    Explore how to work with and make changes in these schemas and patterns.    Narrative Therapy    Explore the patient's story of his/her life from his/her perspective.    Explore alternate ways of understanding their experience, identifying exceptions, developing new  themes.    Interpersonal Psychotherapy    Explore patterns in relationships that are effective or ineffective at helping patient reach their goals, find satisfying experience.    Discuss new patterns or behaviors to engage in for improved social functioning.    Behavioral Activation    Discuss steps patient can take to become more involved in meaningful activity.    Identify barriers to these activities and explore possible solutions.    Mindfulness-Based Strategies    Discuss skills based on development and application of mindfulness.    Skills drawn from compassion-focused therapy, dialectical behavior therapy, mindfulness-based stress reduction, mindfulness-based cognitive therapy, etc.      We have developed these goals together during our work to this point. Patient has assisted in the development of these goals and has agreed to this treatment plan.       Edmundo Hansen, MARJAN  November 18, 2020

## 2021-02-16 ENCOUNTER — VIRTUAL VISIT (OUTPATIENT)
Dept: BEHAVIORAL HEALTH | Facility: CLINIC | Age: 41
End: 2021-02-16
Payer: COMMERCIAL

## 2021-02-16 DIAGNOSIS — Z53.9 ERRONEOUS ENCOUNTER--DISREGARD: Primary | ICD-10-CM
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
## 2021-03-05 ENCOUNTER — VIRTUAL VISIT (OUTPATIENT)
Dept: BEHAVIORAL HEALTH | Facility: CLINIC | Age: 41
End: 2021-03-05
Payer: COMMERCIAL

## 2021-03-05 DIAGNOSIS — F33.0 MAJOR DEPRESSIVE DISORDER, RECURRENT EPISODE, MILD (H): Primary | ICD-10-CM

## 2021-03-05 PROCEDURE — 90834 PSYTX W PT 45 MINUTES: CPT | Mod: 95 | Performed by: PSYCHOLOGIST

## 2021-03-05 NOTE — PROGRESS NOTES
"[facility]: Integrated Behavioral Health  March 5, 2021      Behavioral Health Clinician Progress Note    Patient Name: Shadia Mcgrath           Service Type: Phone Visit      Service Location:  Phone Visit      Session Start Time: 9:00  Session End Time: 9:48      Session Length: 38 - 52      Attendees: Patient     Visit Activities (Refresh list every visit): Phone Encounter    Shadia Mcgrath is a 40 year old female who is being evaluated via a telephone visit.      The patient has been notified of the following:     \"We have found that certain health care needs can be provided without the need for a face to face visit.  This service lets us provide the care you need with a short phone conversation.      I will have full access to your [place] medical record during this entire phone call.   I will be taking notes for your medical record.     Since this is like an office visit, we will bill your insurance company for this service.  Please check with your medical insurance if this type of telephone visit/virtual care is covered.  You may be responsible for the cost of this service if insurance coverage is denied.      There are potential benefits and risks of telephone visits (e.g. limits to patient confidentiality) that differ from in-person visits.?  Confidentiality still applies for telephone services, and nobody will record the visit.  It is important to be in a quiet, private space that is free of distractions (including cell phone or other devices) during the visit.??     If during the course of the call I believe a telephone visit is not appropriate, you will not be charged for this service\"    Consent has been obtained for this service by care team member: yes.    Diagnostic Assessment Date: 10/5/2020  Treatment Plan Review Date: 6/5/2021  See Flowsheets for today's PHQ-9 and ZACH-7 results  Previous PHQ-9:   PHQ-9 SCORE 8/27/2020   PHQ-9 Total Score 13 " "    Previous ZACH-7: No flowsheet data found.     MELVIN LEVEL:  No flowsheet data found.    DATA  Extended Session (60+ minutes): No  Interactive Complexity: No  Crisis: No    Treatment Objective(s) Addressed in This Session:  Target Behavior(s): disease management/lifestyle changes related to psychosocial and relational stress    Depressed Mood: Decrease frequency and intensity of feeling down, depressed, hopeless  Identify negative self-talk and behaviors: challenge core beliefs, myths, and actions  Decrease thoughts that you'd be better off dead or of suicide / self-harm  Relationship Problems: will address relationship difficulties in a more adaptive manner  Improve compulsive shopping behavior    Current Stressors / Issues:  Shadia shared she has felt more down and depressed lately. She indicates having more thoughts of being a \"lousy mom\" with regard to her son. She indicates that when she feels down, lonely, or \"empty\" as she will impulsively buy items she does not need online. She described the sense of achievement she gets from bidding on an item and \"winning\" it online even though she does not need it. When asked about how often this happens, Shadia did share she is prone to receiving [employer] or other packages at home most days during the week. She indicated that the number of packages she receives is often correlated to how bad her depression is each week.     Explored how her depression and compulsive shopping behavior are related. Namely we talked about the short-term relief she experiences from shopping from her depression, but how it ultimately exacerbates it in the long-term (e.g. financial stress and guilt). Described more specifically the triggers she can identify for shopping, to which she indicated feeling alone. Looked at alternative behaviors to more adaptively respond to triggers. Shadia indicated she can try reaching out to someone for support instead and avoid social media (as this tends to " "exacerbate her mood at times).     Also made use of exploratory and cognitive interventions to help Shadia reduce her feelings of depression and negative thoughts, such as \"I am a bad mother.\" Looked at alternative explanations, reasons her thoughts are not true, and explored how others might view her as a mother/friend. Connected how thinking more rationally tends to improve her mood in session. Looked at how thinking in this new way makes her feel more content with herself.     Of concern, Shadia shared that she has experienced some SI in the last few weeks. When asked about this, she indicated occasionally having fleeting, largely passive thoughts, but reminders of her family and children are strong deterrents for her. She denied making plans for self-harm or having any intent to hurt herself. She attested to being able to manage her safety today and noted she felt much better after our conversation, more hopeful. Her thinking was remarkable for occasionally thinking how she might hurt herself (she indicated having fleeting thoughts of several methods), but indicated these thoughts were easily controllable.     Progress on Treatment Objective(s) / Homework:  Satisfactory progress - ACTION (Actively working towards change); Intervened by reinforcing change plan / affirming steps taken    Cognitive-behavioral therapy for depression today    Care Plan review completed: Yes    Medication Review:  No changes to current psychiatric medication(s)    Medication Compliance:  Yes    Changes in Health Issues:   None reported    Chemical Use Review:   Substance Use: Chemical use reviewed, no active concerns identified      Tobacco Use: No current tobacco use.      Assessment: Current Emotional / Mental Status (status of significant symptoms):  Risk status (Self / Other harm or suicidal ideation)  Patient has had a history of suicidal ideation: SI as a youth only and suicide attempts: one prior attempt at age 15; none " recent  Patient denies current fears or concerns for personal safety.     Patient denies current or recent suicidal ideation or behaviors.  Patient denies current or recent homicidal ideation or behaviors.  Patient denies current or recent self injurious behavior or ideation.  Patient denies other safety concerns.     A safety and risk management plan has not been developed at this time, however patient was encouraged to call [place] [phone] should there be a change in any of these risk factors.    Stokes Suicide Severity Rating Scale (Lifetime/Recent)  Stokes Suicide Severity Rating (Lifetime/Recent) 9/21/2020   1. Wish to be Dead (Lifetime) Yes   1. Wish to be Dead (Recent) No   2. Non-Specific Active Suicidal Thoughts (Lifetime) Yes   2. Non-Specific Active Suicidal Thoughts (Recent) No   3. Active Suicidal Ideation with any Methods (Not Plan) Without Intent to Act (Lifetime) Yes   3. Active Suicidal Ideation with any Methods (Not Plan) Without Intent to Act (Recent) No   4. Active Suicidal Ideation with Some Intent to Act, Without Specific Plan (Lifetime) Yes   4. Active Suicidal Ideation with Some Intent to Act, Without Specific Plan (Recent) No   5. Active Suicidal Ideation with Specific Plan and Intent (Lifetime) Yes   5. Active Suicidal Ideation with Specific Plan and Intent (Recent) No   Most Severe Ideation Rating (Lifetime) 4   Frequency (Lifetime) 4   Duration (Lifetime) 4   Controllability (Lifetime) 3   Protective Factors  (Lifetime) 2   Reasons for Ideation (Lifetime) 4   Most Severe Ideation Rating (Past Month) NA   Frequency (Past Month) NA   Duration (Past Month) NA   Controllability (Past Month) NA   Protective Factors (Past Month) NA   Reasons for Ideation (Past Month) NA   Actual Attempt (Lifetime) Yes   Actual Attempt (Past 3 Months) No   Has subject engaged in non-suicidal self-injurious behavior? (Lifetime) No   Has subject engaged in non-suicidal self-injurious behavior? (Past 3  Months) No   Interrupted Attempts (Lifetime) No   Interrupted Attempts (Past 3 Months) No   Aborted or Self-Interrupted Attempt (Lifetime) No   Aborted or Self-Interrupted Attempt (Past 3 Months) No   Preparatory Acts or Behavior (Lifetime) No   Preparatory Acts or Behavior (Past 3 Months) No   Most Recent Attempt Actual Lethality Code 2   Most Lethal Attempt Actual Lethality Code 2   Initial/First Attempt Actual Lethality Code 2       Appearance:   Unable to assess   Eye Contact:   Unable to assess   Psychomotor Behavior: Unable to assess   Attitude:   Cooperative  Friendly Pleasant  Orientation:   All  Speech   Rate / Production: Normal    Volume:  Normal   Mood:    Normal  Affect:    Appropriate   Thought Content:  Clear   Thought Form:  Coherent  Logical   Insight:    Good     Diagnoses:  1. Major depressive disorder, recurrent episode, mild (H)        Collateral Reports Completed:   Not Applicable    Plan: (Homework, other):  Shadia was given information about behavioral services and encouraged to schedule a follow up appointment with the clinic [facility] in 3 weeks. Provided self-reflection and thought exercises based on topics covered today for HW. CD Recommendations: No indications of CD issues. Edmundo Hansen, MARJAN     ______________________________________________________________________    [facility] Integrated Behavioral Health Treatment Plan    Client's Name: Shadia Mcgrath  YOB: 1980    Date: 3/5/2021    DSM-V Diagnoses: 296.31 (F33.0) Major Depressive Disorder, Recurrent Episode, Mild _  WHODAS: none on file  Clinical Global Impressions  First:  Considering your total clinical experience with this particular patient population, how severe are the patient's symptoms at this time?: 4 (3/5/2021 11:05 AM)      Most recent:  Compared to the patient's condition at the START of treatment, this patient's condition is: 4 (3/5/2021 11:05 AM)      Referral / Collaboration:  Will collaborate with care team  as indicated during treatment.    Anticipated number of session or this episode of care: 10+      MeasurableTreatment Goal(s) related to diagnosis / functional impairment(s)  Goal 1:  Patient will experience a reduction in depressive and anxious symptoms, along with a corresponding increase in positive emotion and life satisfaction.    Objective #A: Patient will experience a reduction in depressed mood, will develop more effective coping skills to manage depressive symptoms, will develop healthy cognitive patterns and beliefs, will increase ability to function adaptively and will continue to take medications as prescribed / participate in supportive activities and services    Status: Continued - Date(s): 3/5/2021    Objective #B: Patient will experience a reduction in anxiety, will develop more effective coping skills to manage anxiety symptoms, will develop healthy cognitive patterns and beliefs and will increase ability to function adaptively  Status: Continued - Date(s): 3/5/2021    Objective #C: Patient will develop better understanding of triggers and coping strategies to stabilize mood  Status: Continued - Date(s):3/5/2021    Goal 2:  Patient will identify and increase engagement in valued activity, i.e. improving social connections/relationships, pursuing occupational goals or personally meaningful pursuits, exploration of meaning in life.     Objective #A: Patient will identify meaningful activity in social, occupational and  personal goals, and increase behavioral activation around these goals   Status: Continued - Date(s): 3/5/2021    Objective #B: Patient will address relationship difficulties in a more adaptive manner  Status: Continued - Date(s): 3/5/2021    Objective #C: Patient will develop coping/problem-solving skills to facilitate more adaptive adjustment and will effectively address problems that interfere with adaptive functioning  Status: Continued - Date(s): 3/5/2021      Possible Therapeutic  Intervention(s)  Psycho-education regarding mental health diagnoses and treatment options      Skills training    Explore skills useful to client in current situation.    Skills include assertiveness, communication, conflict management, problem-solving, relaxation, etc.    Solution-Focused Therapy    Explore patterns in patient's relationships and discuss options for new behaviors.    Explore patterns in patient's actions and choices and discuss options for new behaviors.    Cognitive-behavioral Therapy    Discuss common cognitive distortions, identify them in patient's life.    Explore ways to challenge, replace, and act against these cognitions.    Acceptance and Commitment Therapy    Explore and identify important values in patient's life.    Discuss ways to commit to behavioral activation around these values.    Psychodynamic psychotherapy    Discuss patient's emotional dynamics and issues and how they impact behaviors.    Explore patient's history of relationships and how they impact present behaviors.    Explore how to work with and make changes in these schemas and patterns.    Narrative Therapy    Explore the patient's story of his/her life from his/her perspective.    Explore alternate ways of understanding their experience, identifying exceptions, developing new themes.    Interpersonal Psychotherapy    Explore patterns in relationships that are effective or ineffective at helping patient reach their goals, find satisfying experience.    Discuss new patterns or behaviors to engage in for improved social functioning.    Behavioral Activation    Discuss steps patient can take to become more involved in meaningful activity.    Identify barriers to these activities and explore possible solutions.    Mindfulness-Based Strategies    Discuss skills based on development and application of mindfulness.    Skills drawn from compassion-focused therapy, dialectical behavior therapy, mindfulness-based stress reduction,  mindfulness-based cognitive therapy, etc.      We have developed these goals together during our work to this point. Patient has assisted in the development of these goals and has agreed to this treatment plan.       Edmundo Hansen LP  3/5/2021

## 2021-03-26 ENCOUNTER — VIRTUAL VISIT (OUTPATIENT)
Dept: BEHAVIORAL HEALTH | Facility: CLINIC | Age: 41
End: 2021-03-26
Payer: COMMERCIAL

## 2021-03-26 DIAGNOSIS — Z53.9 ERRONEOUS ENCOUNTER--DISREGARD: Primary | ICD-10-CM

## 2021-03-26 NOTE — PROGRESS NOTES
Appointment Cancelled by Patient    Shadia indicated she had an emergent appointment when this provider called that made her unable to complete our session today. We rescheduled for 4/13/2021.     Edmundo Hansen Psy.D, MARJAN   Behavioral Health Clinician   [facility]

## 2021-04-13 ENCOUNTER — VIRTUAL VISIT (OUTPATIENT)
Dept: BEHAVIORAL HEALTH | Facility: CLINIC | Age: 41
End: 2021-04-13
Payer: COMMERCIAL

## 2021-04-13 DIAGNOSIS — F33.0 MAJOR DEPRESSIVE DISORDER, RECURRENT EPISODE, MILD (H): Primary | ICD-10-CM

## 2021-04-13 PROCEDURE — 90834 PSYTX W PT 45 MINUTES: CPT | Mod: 95 | Performed by: PSYCHOLOGIST

## 2021-04-13 NOTE — PROGRESS NOTES
"[facility]: Integrated Behavioral Health  April 13, 2021      Behavioral Health Clinician Progress Note    Patient Name: Shadia Mcgrath           Service Type: Phone Visit      Service Location:  Phone Visit      Session Start Time: 9:00  Session End Time: 9:48      Session Length: 38 - 52      Attendees: Patient     Visit Activities (Refresh list every visit): Phone Encounter    Shadia Mcgrath is a 40 year old female who is being evaluated via a telephone visit.      The patient has been notified of the following:     \"We have found that certain health care needs can be provided without the need for a face to face visit.  This service lets us provide the care you need with a short phone conversation.      I will have full access to your [facility] medical record during this entire phone call.   I will be taking notes for your medical record.     Since this is like an office visit, we will bill your insurance company for this service.  Please check with your medical insurance if this type of telephone visit/virtual care is covered.  You may be responsible for the cost of this service if insurance coverage is denied.      There are potential benefits and risks of telephone visits (e.g. limits to patient confidentiality) that differ from in-person visits.?  Confidentiality still applies for telephone services, and nobody will record the visit.  It is important to be in a quiet, private space that is free of distractions (including cell phone or other devices) during the visit.??     If during the course of the call I believe a telephone visit is not appropriate, you will not be charged for this service\"    Consent has been obtained for this service by care team member: yes.    Diagnostic Assessment Date: 10/5/2020  Treatment Plan Review Date: 6/5/2021  See Flowsheets for today's PHQ-9 and ZACH-7 results  Previous PHQ-9:   PHQ-9 SCORE 8/27/2020   PHQ-9 Total Score 13 "     Previous ZACH-7: No flowsheet data found.     MELVIN LEVEL:  No flowsheet data found.    DATA  Extended Session (60+ minutes): No  Interactive Complexity: No  Crisis: No    Treatment Objective(s) Addressed in This Session:  Target Behavior(s): disease management/lifestyle changes related to psychosocial and relational stress    Relationship Problems: will address relationship difficulties in a more adaptive manner  Anger Management: will learn strategies to resolve conflict adaptively and will learn and practice positive anger management skills     Current Stressors / Issues:  [employer] continued to help Shadia with depression and relationship concerns today. Shadia said she recently moved into a new apartment and already finds this to be a better fit for her and her son. She is having some stress at work she says, citing a few examples of challenging interpersonal conflicts with her co-workers. Raised concern to Shadia about how these conflicts unfolded, as her report suggested use of expletives and shouting during the conflicts. This transitioned us to talking about Shadia's history with anger, how she has a longstanding history with trouble managing her mood and her tendency to be quick to anger at times in interpersonal contexts. Talked some about triggers for her anger, why she believes she reacts this way in certain situations. Focused on exploring various themes in her triggers that came up for her. [employer] provided feedback on a few strategies to help Shadia manage her anger at times, as we talked about how this could lead to outcomes she would not prefer, such as losing employment.      Progress on Treatment Objective(s) / Homework:  Satisfactory progress - ACTION (Actively working towards change); Intervened by reinforcing change plan / affirming steps taken    Strategies to manage anger explored today      Care Plan review completed: No    Medication Review:  No changes to current psychiatric  medication(s)    Medication Compliance:  Yes    Changes in Health Issues:   None reported    Chemical Use Review:   Substance Use: Chemical use reviewed, no active concerns identified      Tobacco Use: No current tobacco use.      Assessment: Current Emotional / Mental Status (status of significant symptoms):  Risk status (Self / Other harm or suicidal ideation)  Patient has had a history of suicidal ideation: SI as a youth only and suicide attempts: one prior attempt at age 15; none recent  Patient denies current fears or concerns for personal safety.     Patient denies current or recent suicidal ideation or behaviors.  Patient denies current or recent homicidal ideation or behaviors.  Patient denies current or recent self injurious behavior or ideation.  Patient denies other safety concerns.     A safety and risk management plan has not been developed at this time, however patient was encouraged to call [place] [phone] should there be a change in any of these risk factors.    [place] Suicide Severity Rating Scale (Lifetime/Recent)  [place] Suicide Severity Rating (Lifetime/Recent) 9/21/2020   1. Wish to be Dead (Lifetime) Yes   1. Wish to be Dead (Recent) No   2. Non-Specific Active Suicidal Thoughts (Lifetime) Yes   2. Non-Specific Active Suicidal Thoughts (Recent) No   3. Active Suicidal Ideation with any Methods (Not Plan) Without Intent to Act (Lifetime) Yes   3. Active Suicidal Ideation with any Methods (Not Plan) Without Intent to Act (Recent) No   4. Active Suicidal Ideation with Some Intent to Act, Without Specific Plan (Lifetime) Yes   4. Active Suicidal Ideation with Some Intent to Act, Without Specific Plan (Recent) No   5. Active Suicidal Ideation with Specific Plan and Intent (Lifetime) Yes   5. Active Suicidal Ideation with Specific Plan and Intent (Recent) No   Most Severe Ideation Rating (Lifetime) 4   Frequency (Lifetime) 4   Duration (Lifetime) 4   Controllability (Lifetime) 3   Protective  Factors  (Lifetime) 2   Reasons for Ideation (Lifetime) 4   Most Severe Ideation Rating (Past Month) NA   Frequency (Past Month) NA   Duration (Past Month) NA   Controllability (Past Month) NA   Protective Factors (Past Month) NA   Reasons for Ideation (Past Month) NA   Actual Attempt (Lifetime) Yes   Actual Attempt (Past 3 Months) No   Has subject engaged in non-suicidal self-injurious behavior? (Lifetime) No   Has subject engaged in non-suicidal self-injurious behavior? (Past 3 Months) No   Interrupted Attempts (Lifetime) No   Interrupted Attempts (Past 3 Months) No   Aborted or Self-Interrupted Attempt (Lifetime) No   Aborted or Self-Interrupted Attempt (Past 3 Months) No   Preparatory Acts or Behavior (Lifetime) No   Preparatory Acts or Behavior (Past 3 Months) No   Most Recent Attempt Actual Lethality Code 2   Most Lethal Attempt Actual Lethality Code 2   Initial/First Attempt Actual Lethality Code 2       Appearance:   Unable to assess   Eye Contact:   Unable to assess   Psychomotor Behavior: Unable to assess   Attitude:   Cooperative  Friendly Pleasant  Orientation:   All  Speech   Rate / Production: Normal    Volume:  Normal   Mood:    Normal  Affect:    Appropriate   Thought Content:  Clear   Thought Form:  Coherent  Logical   Insight:    Good     Diagnoses:  1. Major depressive disorder, recurrent episode, mild (H)        Collateral Reports Completed:   Not Applicable    Plan: (Homework, other):  Shadia was given information about behavioral services and encouraged to schedule a follow up appointment with the clinic [facility] in 3 weeks. Reviewed anger management stratgies she could use today. CD Recommendations: No indications of CD issues.     Edmundo Hansen Psy.D, LP   Behavioral Health Clinician   -[facility]       ______________________________________________________________________    [facility] Integrated Behavioral Health Treatment Plan    Client's Name: Shadia Noel  Ramila  YOB: 1980    Date: 3/5/2021    DSM-V Diagnoses: 296.31 (F33.0) Major Depressive Disorder, Recurrent Episode, Mild _  WHODAS: none on file  Clinical Global Impressions  First:  Considering your total clinical experience with this particular patient population, how severe are the patient's symptoms at this time?: 4 (3/5/2021 11:05 AM)      Most recent:  Compared to the patient's condition at the START of treatment, this patient's condition is: 4 (3/5/2021 11:05 AM)      Referral / Collaboration:  Will collaborate with care team as indicated during treatment.    Anticipated number of session or this episode of care: 10+      MeasurableTreatment Goal(s) related to diagnosis / functional impairment(s)  Goal 1:  Patient will experience a reduction in depressive and anxious symptoms, along with a corresponding increase in positive emotion and life satisfaction.    Objective #A: Patient will experience a reduction in depressed mood, will develop more effective coping skills to manage depressive symptoms, will develop healthy cognitive patterns and beliefs, will increase ability to function adaptively and will continue to take medications as prescribed / participate in supportive activities and services    Status: Continued - Date(s): 3/5/2021    Objective #B: Patient will experience a reduction in anxiety, will develop more effective coping skills to manage anxiety symptoms, will develop healthy cognitive patterns and beliefs and will increase ability to function adaptively  Status: Continued - Date(s): 3/5/2021    Objective #C: Patient will develop better understanding of triggers and coping strategies to stabilize mood  Status: Continued - Date(s):3/5/2021    Goal 2:  Patient will identify and increase engagement in valued activity, i.e. improving social connections/relationships, pursuing occupational goals or personally meaningful pursuits, exploration of meaning in life.     Objective  #A: Patient will identify meaningful activity in social, occupational and  personal goals, and increase behavioral activation around these goals   Status: Continued - Date(s): 3/5/2021    Objective #B: Patient will address relationship difficulties in a more adaptive manner  Status: Continued - Date(s): 3/5/2021    Objective #C: Patient will develop coping/problem-solving skills to facilitate more adaptive adjustment and will effectively address problems that interfere with adaptive functioning  Status: Continued - Date(s): 3/5/2021      Possible Therapeutic Intervention(s)  Psycho-education regarding mental health diagnoses and treatment options      Skills training    Explore skills useful to client in current situation.    Skills include assertiveness, communication, conflict management, problem-solving, relaxation, etc.    Solution-Focused Therapy    Explore patterns in patient's relationships and discuss options for new behaviors.    Explore patterns in patient's actions and choices and discuss options for new behaviors.    Cognitive-behavioral Therapy    Discuss common cognitive distortions, identify them in patient's life.    Explore ways to challenge, replace, and act against these cognitions.    Acceptance and Commitment Therapy    Explore and identify important values in patient's life.    Discuss ways to commit to behavioral activation around these values.    Psychodynamic psychotherapy    Discuss patient's emotional dynamics and issues and how they impact behaviors.    Explore patient's history of relationships and how they impact present behaviors.    Explore how to work with and make changes in these schemas and patterns.    Narrative Therapy    Explore the patient's story of his/her life from his/her perspective.    Explore alternate ways of understanding their experience, identifying exceptions, developing new themes.    Interpersonal Psychotherapy    Explore patterns in relationships that are  effective or ineffective at helping patient reach their goals, find satisfying experience.    Discuss new patterns or behaviors to engage in for improved social functioning.    Behavioral Activation    Discuss steps patient can take to become more involved in meaningful activity.    Identify barriers to these activities and explore possible solutions.    Mindfulness-Based Strategies    Discuss skills based on development and application of mindfulness.    Skills drawn from compassion-focused therapy, dialectical behavior therapy, mindfulness-based stress reduction, mindfulness-based cognitive therapy, etc.      We have developed these goals together during our work to this point. Patient has assisted in the development of these goals and has agreed to this treatment plan.       Edmundo Hansen LP  3/5/2021

## 2021-04-19 ENCOUNTER — VIRTUAL VISIT (OUTPATIENT)
Dept: PULMONOLOGY | Facility: CLINIC | Age: 41
End: 2021-04-19
Payer: COMMERCIAL

## 2021-04-19 DIAGNOSIS — D80.1 HYPOGAMMAGLOBULINEMIA (H): ICD-10-CM

## 2021-04-19 DIAGNOSIS — J45.909 SEVERE ASTHMA, UNSPECIFIED WHETHER COMPLICATED, UNSPECIFIED WHETHER PERSISTENT: Primary | ICD-10-CM

## 2021-04-19 DIAGNOSIS — R05.9 COUGH: ICD-10-CM

## 2021-04-19 PROCEDURE — 99214 OFFICE O/P EST MOD 30 MIN: CPT | Mod: 95

## 2021-04-19 RX ORDER — METRONIDAZOLE 500 MG/1
500 TABLET ORAL
COMMUNITY
Start: 2021-04-17 | End: 2021-04-24

## 2021-04-19 RX ORDER — AZITHROMYCIN 250 MG/1
250 TABLET, FILM COATED ORAL DAILY
Qty: 30 TABLET | Refills: 6 | Status: SHIPPED | OUTPATIENT
Start: 2021-04-19 | End: 2021-07-26

## 2021-04-19 NOTE — PROGRESS NOTES
Shadia is a 40 year old who is being evaluated via a billable telephone visit.  Video visit was attempted, but this was transitioned to telephone visit, as she could not login to [email or web address].      [facility]  Pulmonary Medicine  Visit Clinic Note  April 19, 2021         ASSESSMENT & PLAN     History of asthma  Hypogammaglobulinemia  History of recurrent pneumonias    Her bronchitis symptoms continue to be absent.  She does endorse some increased shortness of breath lately.  I will restart her azithromycin, but give it to her daily at 250 mg.  This will be more conventional with the add-on treatment to severe asthma.  I will also continue her Breo for now.  She remains off Pulmicort.    It is still little bit unclear to me what the assessment is for her regarding the diagnosis of potential common variable immune deficiency.  I will touch base with her allergist to see if any further follow-up is necessary.    I will have her follow-up with me in 3 months.  Potentially at that point if everything is stable she can transition back to her local pulmonologist.      Kleber Guy MD          Today's visit note:     Chief Complaint: Shadia Mcgrath is a 40 year old year old female who is being seen for RECHECK (Return video visit )    HISTORY OF PRESENT ILLNESS:    This is a 40-year-old female with a history of asthma and recurrent pneumonias who is presenting to the pulmonary clinic today for a follow-up visit.    At our last visit 3 months ago, I had her stop her Pulmicort.  She remains on Breo.  I had previously treated her with chronic azithromycin, however her refills ran out.  Apparently she states that the pharmacy was trying to reach me for refilling this, but I do not have any record of this.  Therefore she has not been getting any azithromycin.  Over the past several weeks, she thinks that her shortness of breath is gotten worse.  She does not have a cough.  She does not have any wheezing or  chest tightness.           Past Medical and Surgical History:     Past Medical History:  Past Medical History:   Diagnosis Date     ADHD (attention deficit hyperactivity disorder)     Asthma   Persistent asthma, steroid dependent     Bilateral chronic knee pain     Bipolar 1 disorder (HCC)     Delayed emergence from general anesthesia     Depression     Fibromyalgia     GERD (gastroesophageal reflux disease)     HCD (health care directive) 10/10/2017     DAPHNE (obstructive sleep apnea)     Osteomyelitis (HCC)     Osteopenia     Seasonal allergic rhinitis     Seizures (HCC)   ? partial seizures / none for somr time. due brain trauma obtained in 2016     Vitamin D deficiency     Past Surgical History:   Procedure Laterality Date     GASTRIC FUNDOPLICATION 1998     KNEE SURGERY Right 4/2009     KNEE SURGERY Left 12/2004     REMOVAL GALLBLADDER     SIMON-EN-Y GASTRIC BYPASS 12/06     SINUS SURGERY     TOTAL KNEE ARTHROPLASTY Left 10/10/2017   Procedure: LEFT ARTHROPLASTY TOTAL KNEE; Surgeon: Yakov Choi MD; Location: [facility] MAIN OR         Family History:     Family History   Problem Relation Age of Onset     No Known Problems Mother      No Known Problems Father      No Known Problems Sister      No Known Problems Brother             Social History:     Social History     Socioeconomic History     Marital status: Single     Spouse name: Not on file     Number of children: Not on file     Years of education: Not on file     Highest education level: Not on file   Occupational History     Not on file   Social Needs     Financial resource strain: Not on file     Food insecurity     Worry: Not on file     Inability: Not on file     Transportation needs     Medical: Not on file     Non-medical: Not on file   Tobacco Use     Smoking status: Never Smoker     Smokeless tobacco: Never Used   Substance and Sexual Activity     Alcohol use: Yes     Comment: occassional      Drug use: Yes     Types: Marijuana     Comment:  rare     Sexual activity: Not on file   Lifestyle     Physical activity     Days per week: Not on file     Minutes per session: Not on file     Stress: Not on file   Relationships     Social connections     Talks on phone: Not on file     Gets together: Not on file     Attends Anabaptist service: Not on file     Active member of club or organization: Not on file     Attends meetings of clubs or organizations: Not on file     Relationship status: Not on file     Intimate partner violence     Fear of current or ex partner: Not on file     Emotionally abused: Not on file     Physically abused: Not on file     Forced sexual activity: Not on file   Other Topics Concern     Parent/sibling w/ CABG, MI or angioplasty before 65F 55M? Not Asked   Social History Narrative    10/29/20        ENVIRONMENTAL HISTORY: [place] The home is heated with a forced air. They does have central air conditioning. The patient's bedroom is furnished with hard ryan in bedroom.  Pets inside the house include 3 cat(s). There is no history of cockroach or mice infestation. There is/are 0 smokers in the house.  The house does not have a damp basement.             Medications:     Current Outpatient Medications   Medication     acetaminophen (TYLENOL) 500 MG tablet     adapalene (DIFFERIN) 0.1 % external gel     albuterol (PROVENTIL) (2.5 MG/3ML) 0.083% neb solution     amphetamine-dextroamphetamine (ADDERALL) 20 MG tablet     azelastine (ASTELIN) 0.1 % nasal spray     budesonide (PULMICORT FLEXHALER) 180 MCG/ACT inhaler     Cetirizine HCl (ZYRTEC PO)     chlorhexidine (PERIDEX) 0.12 % solution     cholecalciferol ( ULTRA STRENGTH) 50 MCG (2000 UT) CAPS     fluticasone (FLONASE) 50 MCG/ACT nasal spray     Fluticasone Furoate (ARNUITY ELLIPTA IN)     gabapentin (NEURONTIN) 300 MG capsule     ibuprofen (ADVIL/MOTRIN) 800 MG tablet     lamoTRIgine (LAMICTAL) 25 MG tablet     methocarbamol  (ROBAXIN) 500 MG tablet     metroNIDAZOLE (FLAGYL) 500 MG tablet     minoxidil (ROGAINE) 2 % external solution     Montelukast Sodium (SINGULAIR PO)     mupirocin (BACTROBAN) 2 % external ointment     omeprazole (PRILOSEC) 20 MG DR capsule     azithromycin (ZITHROMAX) 250 MG tablet     No current facility-administered medications for this visit.             Review of Systems:     A complete review of systems was otherwise negative except as noted in the HPI.    PHYSICAL EXAM:  There were no vitals taken for this visit.     This was a telephone visit exam.  No cough or wheeze audible.  She is speaking full sentences.         Data:   All laboratory and imaging data reviewed.      PFT:   Pulmonary function testing performed on January 20, 2021 shows an FEV1/FVC ratio of 0.78.  Her FVC is 3.77 L which is 93% predicted, and the FEV1 is 2.95 L which is 90% predicted.  The residual volume is 116% predicted and the total lung capacity is 105% predicted.  Diffusion capacity is 130% predicted.       PFT Interpretation:  No airflow obstruction.  Normal diffusion capacity.  There is improved vital capacity and FEV1 when compared to spirometry in September 2019.    Chest CT:   9/2019: tree-in-bud opacities. Left lower lobe consolidative changes.

## 2021-04-19 NOTE — LETTER
4/19/2021         RE: Shadia Mcgrath  [address]  [place] [address]        Dear Colleague,    Thank you for referring your patient, Shadia Mcgrath, to the [facility] [place]. Please see a copy of my visit note below.    Shadia is a 40 year old who is being evaluated via a billable telephone visit.  Video visit was attempted, but this was transitioned to telephone visit, as she could not login to [employer].      [facility]  Pulmonary Medicine  Visit Clinic Note  April 19, 2021         ASSESSMENT & PLAN     History of asthma  Hypogammaglobulinemia  History of recurrent pneumonias    Her bronchitis symptoms continue to be absent.  She does endorse some increased shortness of breath lately.  I will restart her azithromycin, but give it to her daily at 250 mg.  This will be more conventional with the add-on treatment to severe asthma.  I will also continue her Breo for now.  She remains off Pulmicort.    It is still little bit unclear to me what the assessment is for her regarding the diagnosis of potential common variable immune deficiency.  I will touch base with her allergist to see if any further follow-up is necessary.    I will have her follow-up with me in 3 months.  Potentially at that point if everything is stable she can transition back to her local pulmonologist.      Kleber Guy MD          Today's visit note:     Chief Complaint: Shadia Mcgrath is a 40 year old year old female who is being seen for RECHECK (Return video visit )    HISTORY OF PRESENT ILLNESS:    This is a 40-year-old female with a history of asthma and recurrent pneumonias who is presenting to the pulmonary clinic today for a follow-up visit.    At our last visit 3 months ago, I had her stop her Pulmicort.  She remains on Breo.  I had previously treated her with chronic azithromycin, however her refills ran out.  Apparently she states  that the pharmacy was trying to reach me for refilling this, but I do not have any record of this.  Therefore she has not been getting any azithromycin.  Over the past several weeks, she thinks that her shortness of breath is gotten worse.  She does not have a cough.  She does not have any wheezing or chest tightness.           Past Medical and Surgical History:     Past Medical History:  Past Medical History:   Diagnosis Date     ADHD (attention deficit hyperactivity disorder)     Asthma   Persistent asthma, steroid dependent     Bilateral chronic knee pain     Bipolar 1 disorder (HCC)     Delayed emergence from general anesthesia     Depression     Fibromyalgia     GERD (gastroesophageal reflux disease)     HCD (health care directive) 10/10/2017     DAPHNE (obstructive sleep apnea)     Osteomyelitis (HCC)     Osteopenia     Seasonal allergic rhinitis     Seizures ([facility])   ? partial seizures / none for somr time. due brain trauma obtained in 2016     Vitamin D deficiency     Past Surgical History:   Procedure Laterality Date     GASTRIC FUNDOPLICATION 1998     KNEE SURGERY Right 4/2009     KNEE SURGERY Left 12/2004     REMOVAL GALLBLADDER     SIMON-EN-Y GASTRIC BYPASS 12/06     SINUS SURGERY     TOTAL KNEE ARTHROPLASTY Left 10/10/2017   Procedure: LEFT ARTHROPLASTY TOTAL KNEE; Surgeon: Yakov Choi MD; Location: [facility] MAIN OR         Family History:     Family History   Problem Relation Age of Onset     No Known Problems Mother      No Known Problems Father      No Known Problems Sister      No Known Problems Brother             Social History:     Social History     Socioeconomic History     Marital status: Single     Spouse name: Not on file     Number of children: Not on file     Years of education: Not on file     Highest education level: Not on file   Occupational History     Not on file   Social Needs     Financial resource strain: Not on file     Food insecurity     Worry: Not on file     Inability:  Not on file     Transportation needs     Medical: Not on file     Non-medical: Not on file   Tobacco Use     Smoking status: Never Smoker     Smokeless tobacco: Never Used   Substance and Sexual Activity     Alcohol use: Yes     Comment: occassional      Drug use: Yes     Types: Marijuana     Comment: rare     Sexual activity: Not on file   Lifestyle     Physical activity     Days per week: Not on file     Minutes per session: Not on file     Stress: Not on file   Relationships     Social connections     Talks on phone: Not on file     Gets together: Not on file     Attends [employer] service: Not on file     Active member of club or organization: Not on file     Attends meetings of clubs or organizations: Not on file     Relationship status: Not on file     Intimate partner violence     Fear of current or ex partner: Not on file     Emotionally abused: Not on file     Physically abused: Not on file     Forced sexual activity: Not on file   Other Topics Concern     Parent/sibling w/ CABG, MI or angioplasty before 65F 55M? Not Asked   Social History Narrative    10/29/20        ENVIRONMENTAL HISTORY: [place] The home is heated with a forced air. They does have central air conditioning. The patient's bedroom is furnished with hard ryan in bedroom.  Pets inside the house include 3 cat(s). There is no history of cockroach or mice infestation. There is/are 0 smokers in the house.  The house does not have a damp basement.             Medications:     Current Outpatient Medications   Medication     acetaminophen (TYLENOL) 500 MG tablet     adapalene (DIFFERIN) 0.1 % external gel     albuterol (PROVENTIL) (2.5 MG/3ML) 0.083% neb solution     amphetamine-dextroamphetamine (ADDERALL) 20 MG tablet     azelastine (ASTELIN) 0.1 % nasal spray     budesonide (PULMICORT FLEXHALER) 180 MCG/ACT inhaler     Cetirizine HCl (ZYRTEC PO)     chlorhexidine (PERIDEX) 0.12 % solution      cholecalciferol ( ULTRA STRENGTH) 50 MCG (2000 UT) CAPS     fluticasone (FLONASE) 50 MCG/ACT nasal spray     Fluticasone Furoate (ARNUITY ELLIPTA IN)     gabapentin (NEURONTIN) 300 MG capsule     ibuprofen (ADVIL/MOTRIN) 800 MG tablet     lamoTRIgine (LAMICTAL) 25 MG tablet     methocarbamol (ROBAXIN) 500 MG tablet     metroNIDAZOLE (FLAGYL) 500 MG tablet     minoxidil (ROGAINE) 2 % external solution     Montelukast Sodium (SINGULAIR PO)     mupirocin (BACTROBAN) 2 % external ointment     omeprazole (PRILOSEC) 20 MG DR capsule     azithromycin (ZITHROMAX) 250 MG tablet     No current facility-administered medications for this visit.             Review of Systems:     A complete review of systems was otherwise negative except as noted in the HPI.    PHYSICAL EXAM:  There were no vitals taken for this visit.     This was a telephone visit exam.  No cough or wheeze audible.  She is speaking full sentences.         Data:   All laboratory and imaging data reviewed.      PFT:   Pulmonary function testing performed on January 20, 2021 shows an FEV1/FVC ratio of 0.78.  Her FVC is 3.77 L which is 93% predicted, and the FEV1 is 2.95 L which is 90% predicted.  The residual volume is 116% predicted and the total lung capacity is 105% predicted.  Diffusion capacity is 130% predicted.       PFT Interpretation:  No airflow obstruction.  Normal diffusion capacity.  There is improved vital capacity and FEV1 when compared to spirometry in September 2019.    Chest CT:   9/2019: tree-in-bud opacities. Left lower lobe consolidative changes.         Sincerely,        Simone Guy MD

## 2021-04-21 ENCOUNTER — TELEPHONE (OUTPATIENT)
Dept: PULMONOLOGY | Facility: CLINIC | Age: 41
End: 2021-04-21

## 2021-04-21 NOTE — TELEPHONE ENCOUNTER
Spoke with pt and was able to schedule 3 month follow up with Dr. Guy via video. Details confirmed with pt.

## 2021-05-06 ENCOUNTER — VIRTUAL VISIT (OUTPATIENT)
Dept: BEHAVIORAL HEALTH | Facility: CLINIC | Age: 41
End: 2021-05-06
Payer: COMMERCIAL

## 2021-05-06 DIAGNOSIS — F33.0 MAJOR DEPRESSIVE DISORDER, RECURRENT EPISODE, MILD (H): Primary | ICD-10-CM

## 2021-05-06 PROCEDURE — 90834 PSYTX W PT 45 MINUTES: CPT | Mod: 95 | Performed by: PSYCHOLOGIST

## 2021-05-06 NOTE — PROGRESS NOTES
"[facility]: Integrated Behavioral Health  May 6, 2021        Behavioral Health Clinician Progress Note    Patient Name: Shadia Mcgrath           Service Type: Phone Visit      Service Location:  Phone Visit      Session Start Time: 9:15  Session End Time: 9:58      Session Length: 38 - 52      Attendees: Patient     Visit Activities (Refresh list every visit): Phone Encounter    Shadia Mcgrath is a 40 year old female who is being evaluated via a telephone visit.      The patient has been notified of the following:     \"We have found that certain health care needs can be provided without the need for a face to face visit.  This service lets us provide the care you need with a short phone conversation.      I will have full access to your [facility] medical record during this entire phone call.   I will be taking notes for your medical record.     Since this is like an office visit, we will bill your insurance company for this service.  Please check with your medical insurance if this type of telephone visit/virtual care is covered.  You may be responsible for the cost of this service if insurance coverage is denied.      There are potential benefits and risks of telephone visits (e.g. limits to patient confidentiality) that differ from in-person visits.?  Confidentiality still applies for telephone services, and nobody will record the visit.  It is important to be in a quiet, private space that is free of distractions (including cell phone or other devices) during the visit.??     If during the course of the call I believe a telephone visit is not appropriate, you will not be charged for this service\"    Consent has been obtained for this service by care team member: yes.    Diagnostic Assessment Date: 10/5/2020  Treatment Plan Review Date: 6/5/2021  See Flowsheets for today's PHQ-9 and ZACH-7 results  Previous PHQ-9:   PHQ-9 SCORE 8/27/2020   PHQ-9 Total Score 13 " "    Previous ZACH-7: No flowsheet data found.     MELVIN LEVEL:  No flowsheet data found.    DATA  Extended Session (60+ minutes): No  Interactive Complexity: No  Crisis: No    Treatment Objective(s) Addressed in This Session:  Target Behavior(s): disease management/lifestyle changes related to psychosocial and relational stress    Depressed Mood: Decrease frequency and intensity of feeling down, depressed, hopeless  Identify negative self-talk and behaviors: challenge core beliefs, myths, and actions  Relationship Problems: will address relationship difficulties in a more adaptive manner    Current Stressors / Issues:  [facility] continued to help Shadia with depression and relationship concerns today. Our session today focused on helping Shadia challenge core beliefs about herself regarding themes of worthlessness she experiences. Explored how she is often quick to help and support others, but feels as though this is rarely reciprocated by others in her life. She described a recent experience with being \"ghosted\" by someone she recently started dating and how this reinforced this core belief. Shadia indicated she was ambivalent about dating again, but thought this individual was trustworthy. We processed how her response with anger might be self-protective in some ways against the theme of worthlessness above. [facility] helped Shadia challenge and process these themes today through CBT treatment. Interventions included cognitive exercises, guided discovery, support, reframes, and insight-oriented techniques.       Progress on Treatment Objective(s) / Homework:  Satisfactory progress - ACTION (Actively working towards change); Intervened by reinforcing change plan / affirming steps taken    Strategies to manage anger explored today      Care Plan review completed: No    Medication Review:  No changes to current psychiatric medication(s)    Medication Compliance:  Yes    Changes in Health Issues:   None reported    Chemical Use " Review:   Substance Use: Chemical use reviewed, no active concerns identified      Tobacco Use: No current tobacco use.      Assessment: Current Emotional / Mental Status (status of significant symptoms):  Risk status (Self / Other harm or suicidal ideation)  Patient has had a history of suicidal ideation: SI as a youth only and suicide attempts: one prior attempt at age 15; none recent  Patient denies current fears or concerns for personal safety.     Patient denies current or recent suicidal ideation or behaviors.  Patient denies current or recent homicidal ideation or behaviors.  Patient denies current or recent self injurious behavior or ideation.  Patient denies other safety concerns.     A safety and risk management plan has not been developed at this time, however patient was encouraged to call [place] [phone] should there be a change in any of these risk factors.    [place] Suicide Severity Rating Scale (Lifetime/Recent)  [place] Suicide Severity Rating (Lifetime/Recent) 9/21/2020   1. Wish to be Dead (Lifetime) Yes   1. Wish to be Dead (Recent) No   2. Non-Specific Active Suicidal Thoughts (Lifetime) Yes   2. Non-Specific Active Suicidal Thoughts (Recent) No   3. Active Suicidal Ideation with any Methods (Not Plan) Without Intent to Act (Lifetime) Yes   3. Active Suicidal Ideation with any Methods (Not Plan) Without Intent to Act (Recent) No   4. Active Suicidal Ideation with Some Intent to Act, Without Specific Plan (Lifetime) Yes   4. Active Suicidal Ideation with Some Intent to Act, Without Specific Plan (Recent) No   5. Active Suicidal Ideation with Specific Plan and Intent (Lifetime) Yes   5. Active Suicidal Ideation with Specific Plan and Intent (Recent) No   Most Severe Ideation Rating (Lifetime) 4   Frequency (Lifetime) 4   Duration (Lifetime) 4   Controllability (Lifetime) 3   Protective Factors  (Lifetime) 2   Reasons for Ideation (Lifetime) 4   Most Severe Ideation Rating (Past Month) NA    Frequency (Past Month) NA   Duration (Past Month) NA   Controllability (Past Month) NA   Protective Factors (Past Month) NA   Reasons for Ideation (Past Month) NA   Actual Attempt (Lifetime) Yes   Actual Attempt (Past 3 Months) No   Has subject engaged in non-suicidal self-injurious behavior? (Lifetime) No   Has subject engaged in non-suicidal self-injurious behavior? (Past 3 Months) No   Interrupted Attempts (Lifetime) No   Interrupted Attempts (Past 3 Months) No   Aborted or Self-Interrupted Attempt (Lifetime) No   Aborted or Self-Interrupted Attempt (Past 3 Months) No   Preparatory Acts or Behavior (Lifetime) No   Preparatory Acts or Behavior (Past 3 Months) No   Most Recent Attempt Actual Lethality Code 2   Most Lethal Attempt Actual Lethality Code 2   Initial/First Attempt Actual Lethality Code 2       Appearance:   Unable to assess   Eye Contact:   Unable to assess   Psychomotor Behavior: Unable to assess   Attitude:   Cooperative  Friendly Pleasant  Orientation:   All  Speech   Rate / Production: Normal    Volume:  Normal   Mood:    Angry  Depressed   Affect:    Appropriate   Thought Content:  Clear   Thought Form:  Coherent  Logical   Insight:    Good     Diagnoses:  1. Major depressive disorder, recurrent episode, mild (H)        Collateral Reports Completed:   Not Applicable    Plan: (Homework, other):  Shadia was given information about behavioral services and encouraged to schedule a follow up appointment with the clinic [facility] in 3 weeks.  CD Recommendations: No indications of CD issues.     Edmundo Hansen Psy.D, LP   Behavioral Health Clinician   [facility] [place]       ______________________________________________________________________    [facility] Integrated Behavioral Health Treatment Plan    Client's Name: Shadia Mcgrath  YOB: 1980    Date: 3/5/2021    DSM-V Diagnoses: 296.31 (F33.0) Major Depressive Disorder, Recurrent Episode, Mild _  WHODAS: none on  file  Clinical Global Impressions  First:  Considering your total clinical experience with this particular patient population, how severe are the patient's symptoms at this time?: 4 (3/5/2021 11:05 AM)      Most recent:  Compared to the patient's condition at the START of treatment, this patient's condition is: 4 (3/5/2021 11:05 AM)      Referral / Collaboration:  Will collaborate with care team as indicated during treatment.    Anticipated number of session or this episode of care: 10+      MeasurableTreatment Goal(s) related to diagnosis / functional impairment(s)  Goal 1:  Patient will experience a reduction in depressive and anxious symptoms, along with a corresponding increase in positive emotion and life satisfaction.    Objective #A: Patient will experience a reduction in depressed mood, will develop more effective coping skills to manage depressive symptoms, will develop healthy cognitive patterns and beliefs, will increase ability to function adaptively and will continue to take medications as prescribed / participate in supportive activities and services    Status: Continued - Date(s): 3/5/2021    Objective #B: Patient will experience a reduction in anxiety, will develop more effective coping skills to manage anxiety symptoms, will develop healthy cognitive patterns and beliefs and will increase ability to function adaptively  Status: Continued - Date(s): 3/5/2021    Objective #C: Patient will develop better understanding of triggers and coping strategies to stabilize mood  Status: Continued - Date(s):3/5/2021    Goal 2:  Patient will identify and increase engagement in valued activity, i.e. improving social connections/relationships, pursuing occupational goals or personally meaningful pursuits, exploration of meaning in life.     Objective #A: Patient will identify meaningful activity in social, occupational and  personal goals, and increase behavioral activation around these goals   Status: Continued -  Date(s): 3/5/2021    Objective #B: Patient will address relationship difficulties in a more adaptive manner  Status: Continued - Date(s): 3/5/2021    Objective #C: Patient will develop coping/problem-solving skills to facilitate more adaptive adjustment and will effectively address problems that interfere with adaptive functioning  Status: Continued - Date(s): 3/5/2021      Possible Therapeutic Intervention(s)  Psycho-education regarding mental health diagnoses and treatment options      Skills training    Explore skills useful to client in current situation.    Skills include assertiveness, communication, conflict management, problem-solving, relaxation, etc.    Solution-Focused Therapy    Explore patterns in patient's relationships and discuss options for new behaviors.    Explore patterns in patient's actions and choices and discuss options for new behaviors.    Cognitive-behavioral Therapy    Discuss common cognitive distortions, identify them in patient's life.    Explore ways to challenge, replace, and act against these cognitions.    Acceptance and Commitment Therapy    Explore and identify important values in patient's life.    Discuss ways to commit to behavioral activation around these values.    Psychodynamic psychotherapy    Discuss patient's emotional dynamics and issues and how they impact behaviors.    Explore patient's history of relationships and how they impact present behaviors.    Explore how to work with and make changes in these schemas and patterns.    Narrative Therapy    Explore the patient's story of his/her life from his/her perspective.    Explore alternate ways of understanding their experience, identifying exceptions, developing new themes.    Interpersonal Psychotherapy    Explore patterns in relationships that are effective or ineffective at helping patient reach their goals, find satisfying experience.    Discuss new patterns or behaviors to engage in for improved social  functioning.    Behavioral Activation    Discuss steps patient can take to become more involved in meaningful activity.    Identify barriers to these activities and explore possible solutions.    Mindfulness-Based Strategies    Discuss skills based on development and application of mindfulness.    Skills drawn from compassion-focused therapy, dialectical behavior therapy, mindfulness-based stress reduction, mindfulness-based cognitive therapy, etc.      We have developed these goals together during our work to this point. Patient has assisted in the development of these goals and has agreed to this treatment plan.       Edmundo Hansen LP  3/5/2021

## 2021-05-17 ENCOUNTER — TELEPHONE (OUTPATIENT)
Dept: PULMONOLOGY | Facility: CLINIC | Age: 41
End: 2021-05-17

## 2021-05-17 NOTE — TELEPHONE ENCOUNTER
I called Ms Mcgrath to follow up on her allergy referral. I gave her the number for Dr. Patino's clinic in [place], [place] and asked her to call and set up a follow up .     Kleber Guy MD

## 2021-05-28 ENCOUNTER — VIRTUAL VISIT (OUTPATIENT)
Dept: BEHAVIORAL HEALTH | Facility: CLINIC | Age: 41
End: 2021-05-28
Payer: COMMERCIAL

## 2021-05-28 DIAGNOSIS — F33.0 MAJOR DEPRESSIVE DISORDER, RECURRENT EPISODE, MILD (H): Primary | ICD-10-CM

## 2021-05-28 PROCEDURE — 90837 PSYTX W PT 60 MINUTES: CPT | Mod: 95 | Performed by: PSYCHOLOGIST

## 2021-05-28 NOTE — PROGRESS NOTES
"[facility]: Integrated Behavioral Health   May 28, 2021        Behavioral Health Clinician Progress Note    Patient Name: Shadia Mcgrath           Service Type: Phone Visit      Service Location:  Phone Visit      Session Start Time: 9:01  Session End Time: 9:58      Session Length: 53 - 60      Attendees: Patient     Visit Activities (Refresh list every visit): Phone Encounter    Shadia Mcgrath is a 40 year old female who is being evaluated via a telephone visit.      The patient has been notified of the following:     \"We have found that certain health care needs can be provided without the need for a face to face visit.  This service lets us provide the care you need with a short phone conversation.      I will have full access to your [facility] medical record during this entire phone call.   I will be taking notes for your medical record.     Since this is like an office visit, we will bill your insurance company for this service.  Please check with your medical insurance if this type of telephone visit/virtual care is covered.  You may be responsible for the cost of this service if insurance coverage is denied.      There are potential benefits and risks of telephone visits (e.g. limits to patient confidentiality) that differ from in-person visits.?  Confidentiality still applies for telephone services, and nobody will record the visit.  It is important to be in a quiet, private space that is free of distractions (including cell phone or other devices) during the visit.??     If during the course of the call I believe a telephone visit is not appropriate, you will not be charged for this service\"    Consent has been obtained for this service by care team member: yes.    Diagnostic Assessment Date: 10/5/2020  Treatment Plan Review Date: 6/5/2021  See Flowsheets for today's PHQ-9 and ZACH-7 results  Previous PHQ-9:   PHQ-9 SCORE 8/27/2020   PHQ-9 Total Score 13 " "    Previous ZACH-7: No flowsheet data found.     MELVIN LEVEL:  No flowsheet data found.    DATA  Extended Session (60+ minutes): No  Interactive Complexity: No  Crisis: No    Treatment Objective(s) Addressed in This Session:  Target Behavior(s): disease management/lifestyle changes related to psychosocial and relational stress    Depressed Mood: Decrease frequency and intensity of feeling down, depressed, hopeless  Identify negative self-talk and behaviors: challenge core beliefs, myths, and actions  Relationship Problems: will address relationship difficulties in a more adaptive manner  Anger Management: will learn strategies to resolve conflict adaptively    Current Stressors / Issues:  [facility] continued to help Shadia with depression and relationship concerns today. Our session focused most on helping Shadia find more adaptive responses to relationship conflict and better manage her anger that occurs. Shadia described feeling quite angry and upset at work lately. She described an instance of learning her raise at work only went up 25 cents and reportedly became outraged. We talked about how she showed her anger, how it came up for her. We looked at how she felt under appreciated, disrespected, hurt, and discounted due to this news. Explored how these are common triggers for anger and how these seem to be patterns in her life under an insight oriented approach. We agreed to discuss more adaptive ways of managing anger and mood difficulties during our session today. We explored this in the context of her going to her supervisor to discuss the raise situation. This writer helped Shadia identify better ways of managing anger, such as taking a \"recess\" if her anger becomes too severe (I.e. walking away and revisiting the conflict later) and using more adaptive communication strategies with others. We rehearsed several of these skills in session and this writer gave her feedback as needed to help her. Shadia took time " "to write down some responses she found helpful, such as using words/phrases like \"I would appreciate\" and \"thankful.\" Discussed her thoughts on how useful these skills would be for her, how they might change the outcomes in relationship conflicts.       Progress on Treatment Objective(s) / Homework:  Satisfactory progress - ACTION (Actively working towards change); Intervened by reinforcing change plan / affirming steps taken    Strategies to manage anger explored today with emphasis on identifying underlying feelings/triggers for anger and developing more adaptive responses to interpersonal conflict.       Care Plan review completed: No    Medication Review:  No changes to current psychiatric medication(s)    Medication Compliance:  Yes    Changes in Health Issues:   None reported    Chemical Use Review:   Substance Use: Chemical use reviewed, no active concerns identified      Tobacco Use: No current tobacco use.      Assessment: Current Emotional / Mental Status (status of significant symptoms):  Risk status (Self / Other harm or suicidal ideation)  Patient has had a history of suicidal ideation: SI as a youth only and suicide attempts: one prior attempt at age 15; none recent  Patient denies current fears or concerns for personal safety.     Patient denies current or recent suicidal ideation or behaviors.  Patient denies current or recent homicidal ideation or behaviors.  Patient denies current or recent self injurious behavior or ideation.  Patient denies other safety concerns.     A safety and risk management plan has not been developed at this time, however patient was encouraged to call [place] [phone] should there be a change in any of these risk factors.    [place] Suicide Severity Rating Scale (Lifetime/Recent)  [place] Suicide Severity Rating (Lifetime/Recent) 9/21/2020   1. Wish to be Dead (Lifetime) Yes   1. Wish to be Dead (Recent) No   2. Non-Specific Active Suicidal Thoughts (Lifetime) Yes "   2. Non-Specific Active Suicidal Thoughts (Recent) No   3. Active Suicidal Ideation with any Methods (Not Plan) Without Intent to Act (Lifetime) Yes   3. Active Suicidal Ideation with any Methods (Not Plan) Without Intent to Act (Recent) No   4. Active Suicidal Ideation with Some Intent to Act, Without Specific Plan (Lifetime) Yes   4. Active Suicidal Ideation with Some Intent to Act, Without Specific Plan (Recent) No   5. Active Suicidal Ideation with Specific Plan and Intent (Lifetime) Yes   5. Active Suicidal Ideation with Specific Plan and Intent (Recent) No   Most Severe Ideation Rating (Lifetime) 4   Frequency (Lifetime) 4   Duration (Lifetime) 4   Controllability (Lifetime) 3   Protective Factors  (Lifetime) 2   Reasons for Ideation (Lifetime) 4   Most Severe Ideation Rating (Past Month) NA   Frequency (Past Month) NA   Duration (Past Month) NA   Controllability (Past Month) NA   Protective Factors (Past Month) NA   Reasons for Ideation (Past Month) NA   Actual Attempt (Lifetime) Yes   Actual Attempt (Past 3 Months) No   Has subject engaged in non-suicidal self-injurious behavior? (Lifetime) No   Has subject engaged in non-suicidal self-injurious behavior? (Past 3 Months) No   Interrupted Attempts (Lifetime) No   Interrupted Attempts (Past 3 Months) No   Aborted or Self-Interrupted Attempt (Lifetime) No   Aborted or Self-Interrupted Attempt (Past 3 Months) No   Preparatory Acts or Behavior (Lifetime) No   Preparatory Acts or Behavior (Past 3 Months) No   Most Recent Attempt Actual Lethality Code 2   Most Lethal Attempt Actual Lethality Code 2   Initial/First Attempt Actual Lethality Code 2       Appearance:   Unable to assess   Eye Contact:   Unable to assess   Psychomotor Behavior: Unable to assess   Attitude:   Cooperative  Friendly Pleasant  Orientation:   All  Speech   Rate / Production: Normal    Volume:  Normal   Mood:    Angry  Depressed   Affect:    Appropriate   Thought Content:  Clear   Thought  Form:  Coherent  Logical   Insight:    Good     Diagnoses:  1. Major depressive disorder, recurrent episode, mild (H)        Collateral Reports Completed:   Not Applicable    Plan: (Homework, other):  Shadia was given information about behavioral services and encouraged to schedule a follow up appointment with the clinic [facility] in 3 weeks. She was given strategies today to better manage anger and relationship conflict. CD Recommendations: No indications of CD issues.     Edmundo Hansen Psy.D, LP   Behavioral Health Clinician   [facility]     5/28/2021    ______________________________________________________________________    [facility] Integrated Behavioral Health Treatment Plan    Client's Name: Shadia Mcgrath  YOB: 1980    Date: 3/5/2021    DSM-V Diagnoses: 296.31 (F33.0) Major Depressive Disorder, Recurrent Episode, Mild _  WHODAS: none on file  Clinical Global Impressions  First:  Considering your total clinical experience with this particular patient population, how severe are the patient's symptoms at this time?: 4 (3/5/2021 11:05 AM)      Most recent:  Compared to the patient's condition at the START of treatment, this patient's condition is: 4 (3/5/2021 11:05 AM)      Referral / Collaboration:  Will collaborate with care team as indicated during treatment.    Anticipated number of session or this episode of care: 10+      MeasurableTreatment Goal(s) related to diagnosis / functional impairment(s)  Goal 1:  Patient will experience a reduction in depressive and anxious symptoms, along with a corresponding increase in positive emotion and life satisfaction.    Objective #A: Patient will experience a reduction in depressed mood, will develop more effective coping skills to manage depressive symptoms, will develop healthy cognitive patterns and beliefs, will increase ability to function adaptively and will continue to take medications as prescribed / participate in  supportive activities and services    Status: Continued - Date(s): 3/5/2021    Objective #B: Patient will experience a reduction in anxiety, will develop more effective coping skills to manage anxiety symptoms, will develop healthy cognitive patterns and beliefs and will increase ability to function adaptively  Status: Continued - Date(s): 3/5/2021    Objective #C: Patient will develop better understanding of triggers and coping strategies to stabilize mood  Status: Continued - Date(s):3/5/2021    Goal 2:  Patient will identify and increase engagement in valued activity, i.e. improving social connections/relationships, pursuing occupational goals or personally meaningful pursuits, exploration of meaning in life.     Objective #A: Patient will identify meaningful activity in social, occupational and  personal goals, and increase behavioral activation around these goals   Status: Continued - Date(s): 3/5/2021    Objective #B: Patient will address relationship difficulties in a more adaptive manner  Status: Continued - Date(s): 3/5/2021    Objective #C: Patient will develop coping/problem-solving skills to facilitate more adaptive adjustment and will effectively address problems that interfere with adaptive functioning  Status: Continued - Date(s): 3/5/2021      Possible Therapeutic Intervention(s)  Psycho-education regarding mental health diagnoses and treatment options      Skills training    Explore skills useful to client in current situation.    Skills include assertiveness, communication, conflict management, problem-solving, relaxation, etc.    Solution-Focused Therapy    Explore patterns in patient's relationships and discuss options for new behaviors.    Explore patterns in patient's actions and choices and discuss options for new behaviors.    Cognitive-behavioral Therapy    Discuss common cognitive distortions, identify them in patient's life.    Explore ways to challenge, replace, and act against these  cognitions.    Acceptance and Commitment Therapy    Explore and identify important values in patient's life.    Discuss ways to commit to behavioral activation around these values.    Psychodynamic psychotherapy    Discuss patient's emotional dynamics and issues and how they impact behaviors.    Explore patient's history of relationships and how they impact present behaviors.    Explore how to work with and make changes in these schemas and patterns.    Narrative Therapy    Explore the patient's story of his/her life from his/her perspective.    Explore alternate ways of understanding their experience, identifying exceptions, developing new themes.    Interpersonal Psychotherapy    Explore patterns in relationships that are effective or ineffective at helping patient reach their goals, find satisfying experience.    Discuss new patterns or behaviors to engage in for improved social functioning.    Behavioral Activation    Discuss steps patient can take to become more involved in meaningful activity.    Identify barriers to these activities and explore possible solutions.    Mindfulness-Based Strategies    Discuss skills based on development and application of mindfulness.    Skills drawn from compassion-focused therapy, dialectical behavior therapy, mindfulness-based stress reduction, mindfulness-based cognitive therapy, etc.      We have developed these goals together during our work to this point. Patient has assisted in the development of these goals and has agreed to this treatment plan.       Edmundo Hansen LP  3/5/2021

## 2021-06-02 ENCOUNTER — TELEPHONE (OUTPATIENT)
Dept: PULMONOLOGY | Facility: CLINIC | Age: 41
End: 2021-06-02

## 2021-06-02 NOTE — TELEPHONE ENCOUNTER
Spoke with pt about rescheduling 7/5 appt with Dr. Guy as he is no longer available. Appt was rescheduled for 7/26 and details confirmed with pt.

## 2021-06-10 ENCOUNTER — VIRTUAL VISIT (OUTPATIENT)
Dept: BEHAVIORAL HEALTH | Facility: CLINIC | Age: 41
End: 2021-06-10
Payer: COMMERCIAL

## 2021-06-10 DIAGNOSIS — F33.0 MAJOR DEPRESSIVE DISORDER, RECURRENT EPISODE, MILD (H): Primary | ICD-10-CM

## 2021-06-10 PROCEDURE — 90837 PSYTX W PT 60 MINUTES: CPT | Mod: 95 | Performed by: PSYCHOLOGIST

## 2021-06-10 NOTE — PROGRESS NOTES
"[facility]: Integrated Behavioral Health   [place] 10, 2021        Behavioral Health Clinician Progress Note    Patient Name: Shadia Mcgrath           Service Type: Phone Visit      Service Location:  Phone Visit      Session Start Time: 9:03  Session End Time: 9:59      Session Length: 53 - 60      Attendees: Patient     Visit Activities (Refresh list every visit): Phone Encounter    Shadia Mcgrath is a 40 year old female who is being evaluated via a telephone visit.      The patient has been notified of the following:     \"We have found that certain health care needs can be provided without the need for a face to face visit.  This service lets us provide the care you need with a short phone conversation.      I will have full access to your [place] medical record during this entire phone call.   I will be taking notes for your medical record.     Since this is like an office visit, we will bill your insurance company for this service.  Please check with your medical insurance if this type of telephone visit/virtual care is covered.  You may be responsible for the cost of this service if insurance coverage is denied.      There are potential benefits and risks of telephone visits (e.g. limits to patient confidentiality) that differ from in-person visits.?  Confidentiality still applies for telephone services, and nobody will record the visit.  It is important to be in a quiet, private space that is free of distractions (including cell phone or other devices) during the visit.??     If during the course of the call I believe a telephone visit is not appropriate, you will not be charged for this service\"    Consent has been obtained for this service by care team member: yes.    Diagnostic Assessment Date: 10/5/2020  Treatment Plan Review Date: 9/10/2021  See Flowsheets for today's PHQ-9 and ZACH-7 results  Previous PHQ-9:   PHQ-9 SCORE 8/27/2020   PHQ-9 Total Score 13 " "    Previous ZACH-7: No flowsheet data found.     MELVIN LEVEL:  No flowsheet data found.    DATA  Extended Session (60+ minutes): No  Interactive Complexity: No  Crisis: No    Treatment Objective(s) Addressed in This Session:  Target Behavior(s): disease management/lifestyle changes related to psychosocial and relational stress    Depressed Mood: Decrease frequency and intensity of feeling down, depressed, hopeless  Identify negative self-talk and behaviors: challenge core beliefs, myths, and actions  Relationship Problems: will address relationship difficulties in a more adaptive manner  Anger Management: will learn strategies to resolve conflict adaptively    Current Stressors / Issues:  [facility] met with Shadia today for a follow-up. Our session continued to focus on helping her address relationship and anger concerns she experiences with work. We began with reviewing how things went with addressing a wage concern with her supervisor at work. Shadia said she had success in addressing her concern with her supervisor, as she described them both laughing and getting along toward the end of her meeting. Shadia indicated she used the techniques we talked about in communicating assertively and effectively and found these strategies helpful in achieving her goals. She also reported having a good interaction with a co-worker about a situation that previously made her angry. She reported however having a \"blow up\" while at the grocery store about a week ago. Shadia indicated she wound up cursing at a friend of hers and got upset with a . We processed this situation together and looked at themes of humiliation and embarrassment that lead her to her outburst of anger. We explored alternative reactions she could use in the future and additional strategies she could implement to resolve conflicts more adaptively. We looked at the STOP technique in particular and this provider advised Shadia how to use the questions " below to better handle situations that contribute to her anger moving forward.     We reviewed how to use the following prompts to better deal with anger:  What will happen that's bad if I continue down this path?  What will happen that is good or positive if I walk away or not act on my anger?  Two hours from how, how will I be wishing I handled this situation?   Two hours from now, how bothersome will this situation for me?      Progress on Treatment Objective(s) / Homework:  Satisfactory progress - ACTION (Actively working towards change); Intervened by reinforcing change plan / affirming steps taken    Strategies to manage anger explored today    Care Plan review completed: No    Medication Review:  No changes to current psychiatric medication(s)    Medication Compliance:  Yes    Changes in Health Issues:   None reported    Chemical Use Review:   Substance Use: Chemical use reviewed, no active concerns identified      Tobacco Use: No current tobacco use.      Assessment: Current Emotional / Mental Status (status of significant symptoms):  Risk status (Self / Other harm or suicidal ideation)  Patient has had a history of suicidal ideation: SI as a youth only and suicide attempts: one prior attempt at age 15; none recent  Patient denies current fears or concerns for personal safety.     Patient denies current or recent suicidal ideation or behaviors.  Patient denies current or recent homicidal ideation or behaviors.  Patient denies current or recent self injurious behavior or ideation.  Patient denies other safety concerns.     A safety and risk management plan has not been developed at this time, however patient was encouraged to call [place] [phone] should there be a change in any of these risk factors.    [place] Suicide Severity Rating Scale (Lifetime/Recent)  [place] Suicide Severity Rating (Lifetime/Recent) 9/21/2020   1. Wish to be Dead (Lifetime) Yes   1. Wish to be Dead (Recent) No   2. Non-Specific  Active Suicidal Thoughts (Lifetime) Yes   2. Non-Specific Active Suicidal Thoughts (Recent) No   3. Active Suicidal Ideation with any Methods (Not Plan) Without Intent to Act (Lifetime) Yes   3. Active Suicidal Ideation with any Methods (Not Plan) Without Intent to Act (Recent) No   4. Active Suicidal Ideation with Some Intent to Act, Without Specific Plan (Lifetime) Yes   4. Active Suicidal Ideation with Some Intent to Act, Without Specific Plan (Recent) No   5. Active Suicidal Ideation with Specific Plan and Intent (Lifetime) Yes   5. Active Suicidal Ideation with Specific Plan and Intent (Recent) No   Most Severe Ideation Rating (Lifetime) 4   Frequency (Lifetime) 4   Duration (Lifetime) 4   Controllability (Lifetime) 3   Protective Factors  (Lifetime) 2   Reasons for Ideation (Lifetime) 4   Most Severe Ideation Rating (Past Month) NA   Frequency (Past Month) NA   Duration (Past Month) NA   Controllability (Past Month) NA   Protective Factors (Past Month) NA   Reasons for Ideation (Past Month) NA   Actual Attempt (Lifetime) Yes   Actual Attempt (Past 3 Months) No   Has subject engaged in non-suicidal self-injurious behavior? (Lifetime) No   Has subject engaged in non-suicidal self-injurious behavior? (Past 3 Months) No   Interrupted Attempts (Lifetime) No   Interrupted Attempts (Past 3 Months) No   Aborted or Self-Interrupted Attempt (Lifetime) No   Aborted or Self-Interrupted Attempt (Past 3 Months) No   Preparatory Acts or Behavior (Lifetime) No   Preparatory Acts or Behavior (Past 3 Months) No   Most Recent Attempt Actual Lethality Code 2   Most Lethal Attempt Actual Lethality Code 2   Initial/First Attempt Actual Lethality Code 2       Appearance:   Unable to assess   Eye Contact:   Unable to assess   Psychomotor Behavior: Unable to assess   Attitude:   Cooperative  Friendly Pleasant  Orientation:   All  Speech   Rate / Production: Normal    Volume:  Normal   Mood:    Angry  Depressed   Affect:    Appropriate    Thought Content:  Clear   Thought Form:  Coherent  Logical   Insight:    Good     Diagnoses:  1. Major depressive disorder, recurrent episode, mild (H)        Collateral Reports Completed:   Not Applicable    Plan: (Homework, other):  Shadia was given information about behavioral services and encouraged to schedule a follow up appointment with the clinic [facility] in 3 weeks.  Shadia provided additional anger management strategies today. CD Recommendations: No indications of CD issues.     Edmundo Hansen Psy.D, LP   Behavioral Health Clinician   [facility]       ______________________________________________________________________    [facility] Integrated Behavioral Health Treatment Plan    Client's Name: Shadia Mcgrath  YOB: 1980    Date: 3/5/2021    DSM-V Diagnoses: 296.31 (F33.0) Major Depressive Disorder, Recurrent Episode, Mild _  WHODAS: none on file  Clinical Global Impressions  First:  Considering your total clinical experience with this particular patient population, how severe are the patient's symptoms at this time?: 4 (3/5/2021 11:05 AM)      Most recent:  Compared to the patient's condition at the START of treatment, this patient's condition is: 4 (3/5/2021 11:05 AM)      Referral / Collaboration:  Will collaborate with care team as indicated during treatment.    Anticipated number of session or this episode of care: 10+      MeasurableTreatment Goal(s) related to diagnosis / functional impairment(s)  Goal 1:  Patient will experience a reduction in depressive and anxious symptoms, along with a corresponding increase in positive emotion and life satisfaction.    Objective #A: Patient will experience a reduction in depressed mood, will develop more effective coping skills to manage depressive symptoms, will develop healthy cognitive patterns and beliefs, will increase ability to function adaptively and will continue to take medications as prescribed /  participate in supportive activities and services    Status: Continued - Date(s): 3/5/2021    Objective #B: Patient will experience a reduction in anxiety, will develop more effective coping skills to manage anxiety symptoms, will develop healthy cognitive patterns and beliefs and will increase ability to function adaptively  Status: Continued - Date(s): 3/5/2021    Objective #C: Patient will develop better understanding of triggers and coping strategies to stabilize mood  Status: Continued - Date(s):3/5/2021    Goal 2:  Patient will identify and increase engagement in valued activity, i.e. improving social connections/relationships, pursuing occupational goals or personally meaningful pursuits, exploration of meaning in life.     Objective #A: Patient will identify meaningful activity in social, occupational and  personal goals, and increase behavioral activation around these goals   Status: Continued - Date(s): 3/5/2021    Objective #B: Patient will address relationship difficulties in a more adaptive manner  Status: Continued - Date(s): 3/5/2021    Objective #C: Patient will develop coping/problem-solving skills to facilitate more adaptive adjustment and will effectively address problems that interfere with adaptive functioning  Status: Continued - Date(s): 3/5/2021      Possible Therapeutic Intervention(s)  Psycho-education regarding mental health diagnoses and treatment options      Skills training    Explore skills useful to client in current situation.    Skills include assertiveness, communication, conflict management, problem-solving, relaxation, etc.    Solution-Focused Therapy    Explore patterns in patient's relationships and discuss options for new behaviors.    Explore patterns in patient's actions and choices and discuss options for new behaviors.    Cognitive-behavioral Therapy    Discuss common cognitive distortions, identify them in patient's life.    Explore ways to challenge, replace, and act  against these cognitions.    Acceptance and Commitment Therapy    Explore and identify important values in patient's life.    Discuss ways to commit to behavioral activation around these values.    Psychodynamic psychotherapy    Discuss patient's emotional dynamics and issues and how they impact behaviors.    Explore patient's history of relationships and how they impact present behaviors.    Explore how to work with and make changes in these schemas and patterns.    Narrative Therapy    Explore the patient's story of his/her life from his/her perspective.    Explore alternate ways of understanding their experience, identifying exceptions, developing new themes.    Interpersonal Psychotherapy    Explore patterns in relationships that are effective or ineffective at helping patient reach their goals, find satisfying experience.    Discuss new patterns or behaviors to engage in for improved social functioning.    Behavioral Activation    Discuss steps patient can take to become more involved in meaningful activity.    Identify barriers to these activities and explore possible solutions.    Mindfulness-Based Strategies    Discuss skills based on development and application of mindfulness.    Skills drawn from compassion-focused therapy, dialectical behavior therapy, mindfulness-based stress reduction, mindfulness-based cognitive therapy, etc.      We have developed these goals together during our work to this point. Patient has assisted in the development of these goals and has agreed to this treatment plan.       Edmundo Hansen LP  3/5/2021

## 2021-06-21 ENCOUNTER — VIRTUAL VISIT (OUTPATIENT)
Dept: BEHAVIORAL HEALTH | Facility: CLINIC | Age: 41
End: 2021-06-21
Payer: COMMERCIAL

## 2021-06-21 DIAGNOSIS — F33.0 MAJOR DEPRESSIVE DISORDER, RECURRENT EPISODE, MILD (H): Primary | ICD-10-CM

## 2021-06-21 PROCEDURE — 90837 PSYTX W PT 60 MINUTES: CPT | Mod: 95 | Performed by: PSYCHOLOGIST

## 2021-06-21 NOTE — PROGRESS NOTES
"[facility]: Integrated Behavioral Health   June 21, 2021        Behavioral Health Clinician Progress Note    Patient Name: Shadia Mcgrath           Service Type: Phone Visit      Service Location:  Phone Visit      Session Start Time: 9:00  Session End Time: 10:00      Session Length: 53 - 60      Attendees: Patient     Visit Activities (Refresh list every visit): Phone Encounter    Shadia Mcgrath is a 40 year old female who is being evaluated via a telephone visit.      The patient has been notified of the following:     \"We have found that certain health care needs can be provided without the need for a face to face visit.  This service lets us provide the care you need with a short phone conversation.      I will have full access to your [facility] medical record during this entire phone call.   I will be taking notes for your medical record.     Since this is like an office visit, we will bill your insurance company for this service.  Please check with your medical insurance if this type of telephone visit/virtual care is covered.  You may be responsible for the cost of this service if insurance coverage is denied.      There are potential benefits and risks of telephone visits (e.g. limits to patient confidentiality) that differ from in-person visits.?  Confidentiality still applies for telephone services, and nobody will record the visit.  It is important to be in a quiet, private space that is free of distractions (including cell phone or other devices) during the visit.??     If during the course of the call I believe a telephone visit is not appropriate, you will not be charged for this service\"    Consent has been obtained for this service by care team member: yes.    Diagnostic Assessment Date: 10/5/2020  Treatment Plan Review Date: 9/10/2021  See Flowsheets for today's PHQ-9 and ZACH-7 results  Previous PHQ-9:   PHQ-9 SCORE 8/27/2020   PHQ-9 Total Score 13 "     Previous ZACH-7: No flowsheet data found.     MELVIN LEVEL:  No flowsheet data found.    DATA  Extended Session (60+ minutes): No  Interactive Complexity: No  Crisis: No    Treatment Objective(s) Addressed in This Session:  Target Behavior(s): disease management/lifestyle changes related to psychosocial and relational stress    Depressed Mood: Decrease frequency and intensity of feeling down, depressed, hopeless  Identify negative self-talk and behaviors: challenge core beliefs, myths, and actions  Relationship Problems: will address relationship difficulties in a more adaptive manner  Anger Management: will learn strategies to resolve conflict adaptively    Current Stressors / Issues:  Shadia presents today with report of feeling more depressed over the last two weeks. She cites a recent negative experience at her work contributed to her feeling this way. Specifically, Shadia reports she met with the owner of her workplace and had requested to have every other Saturday evening off, as she was suspecting that working Saturdays was adversely affecting her mental health. Her request was reportedly denied and Shadia indicated she then put in her notice to resign. She spoke at about feeling angry, hurt, and betrayed by this event, as she described how she does a lot for her company even when it is not expected/asked of her.     We reflected on what went well in the interaction, as she described doing a good job of managing her anger in the moment, despite feeling betrayed. We did talk about how feeling hurt, betrayed, dismissed are like ingredients to anger, how these feelings can still affect interpretations of the events that unfold.     As we talked, [facility] provided her interpersonal and objective feedback about the situation she reports to help build her insight to how her emotional experiences during conflicts affect outcomes. We talked about viewing the situation from the owner's perspective, how some of Shadia's  "words, while calm, might have made him feel threatened. Discussed how Shadia might deliver threatening messages when hurt and we talked about how this affects her interpersonal interactions.     [facility] presented Shadia with additional strategies to manage interpersonal conflict and her anger more effectively. Also gave her reinforcing feedback about what she is doing well in interactions. We reframed her tendency to say she does things \"badly\" as just working on improvement/optimization as well.     Of concern, Shadia described worse depression over the last two weeks which included more frequent crying spells and evidence of passive SI - she indicated not caring whether she woke up some mornings. She denied plans, means, or intent to hurt herself fortunately. [facility] recommended she schedule a follow-up with her new PCP to review options for psychotropic medication. Shadia currently takes Lamictal, which can have adverse effects on irritability, SI, and mood. She agreed to schedule a follow-up with her PCP to review medication options.     Progress on Treatment Objective(s) / Homework:  Satisfactory progress - ACTION (Actively working towards change); Intervened by reinforcing change plan / affirming steps taken    Strategies to manage anger explored today    Care Plan review completed: No    Medication Review:  No changes to current psychiatric medication(s)    Medication Compliance:  Yes    Changes in Health Issues:   None reported    Chemical Use Review:   Substance Use: Chemical use reviewed, no active concerns identified      Tobacco Use: No current tobacco use.      Assessment: Current Emotional / Mental Status (status of significant symptoms):  Risk status (Self / Other harm or suicidal ideation)  Patient has had a history of suicidal ideation: SI as a youth only and suicide attempts: one prior attempt at age 15; none recent  Patient denies current fears or concerns for personal safety.     Patient denies " current or recent suicidal ideation or behaviors.  Patient denies current or recent homicidal ideation or behaviors.  Patient denies current or recent self injurious behavior or ideation.  Patient denies other safety concerns.     A safety and risk management plan has not been developed at this time, however patient was encouraged to call [place] [phone] should there be a change in any of these risk factors.    [place] Suicide Severity Rating Scale (Lifetime/Recent)  [place] Suicide Severity Rating (Lifetime/Recent) 9/21/2020   1. Wish to be Dead (Lifetime) Yes   1. Wish to be Dead (Recent) No   2. Non-Specific Active Suicidal Thoughts (Lifetime) Yes   2. Non-Specific Active Suicidal Thoughts (Recent) No   3. Active Suicidal Ideation with any Methods (Not Plan) Without Intent to Act (Lifetime) Yes   3. Active Suicidal Ideation with any Methods (Not Plan) Without Intent to Act (Recent) No   4. Active Suicidal Ideation with Some Intent to Act, Without Specific Plan (Lifetime) Yes   4. Active Suicidal Ideation with Some Intent to Act, Without Specific Plan (Recent) No   5. Active Suicidal Ideation with Specific Plan and Intent (Lifetime) Yes   5. Active Suicidal Ideation with Specific Plan and Intent (Recent) No   Most Severe Ideation Rating (Lifetime) 4   Frequency (Lifetime) 4   Duration (Lifetime) 4   Controllability (Lifetime) 3   Protective Factors  (Lifetime) 2   Reasons for Ideation (Lifetime) 4   Most Severe Ideation Rating (Past Month) NA   Frequency (Past Month) NA   Duration (Past Month) NA   Controllability (Past Month) NA   Protective Factors (Past Month) NA   Reasons for Ideation (Past Month) NA   Actual Attempt (Lifetime) Yes   Actual Attempt (Past 3 Months) No   Has subject engaged in non-suicidal self-injurious behavior? (Lifetime) No   Has subject engaged in non-suicidal self-injurious behavior? (Past 3 Months) No   Interrupted Attempts (Lifetime) No   Interrupted Attempts (Past 3 Months) No    Aborted or Self-Interrupted Attempt (Lifetime) No   Aborted or Self-Interrupted Attempt (Past 3 Months) No   Preparatory Acts or Behavior (Lifetime) No   Preparatory Acts or Behavior (Past 3 Months) No   Most Recent Attempt Actual Lethality Code 2   Most Lethal Attempt Actual Lethality Code 2   Initial/First Attempt Actual Lethality Code 2       Appearance:   Unable to assess   Eye Contact:   Unable to assess   Psychomotor Behavior: Unable to assess   Attitude:   Cooperative  Friendly Pleasant  Orientation:   All  Speech   Rate / Production: Normal    Volume:  Normal   Mood:    Angry  Depressed   Affect:    Appropriate   Thought Content:  Clear   Thought Form:  Coherent  Logical   Insight:    Good     Diagnoses:  1. Major depressive disorder, recurrent episode, mild (H)        Collateral Reports Completed:   Not Applicable    Plan: (Homework, other):  Shadia was given information about behavioral services and encouraged to schedule a follow up appointment with the clinic [facility] in 2 weeks.  Shadia provided additional anger management strategies today. She was recommended to schedule a follow-up with her PCP to review medication options for better mood management. CD Recommendations: No indications of CD issues.     Edmundo Hansen Psy.D, LP   Behavioral Health Clinician   [facility]     June 21, 2021        ______________________________________________________________________    [facility] Integrated Behavioral Health Treatment Plan    Client's Name: Shadia Mcgrath  YOB: 1980    Date: 3/5/2021    DSM-V Diagnoses: 296.31 (F33.0) Major Depressive Disorder, Recurrent Episode, Mild _  WHODAS: none on file  Clinical Global Impressions  First:  Considering your total clinical experience with this particular patient population, how severe are the patient's symptoms at this time?: 4 (3/5/2021 11:05 AM)      Most recent:  Compared to the patient's condition at the START of  treatment, this patient's condition is: 4 (3/5/2021 11:05 AM)      Referral / Collaboration:  Will collaborate with care team as indicated during treatment.    Anticipated number of session or this episode of care: 10+      MeasurableTreatment Goal(s) related to diagnosis / functional impairment(s)  Goal 1:  Patient will experience a reduction in depressive and anxious symptoms, along with a corresponding increase in positive emotion and life satisfaction.    Objective #A: Patient will experience a reduction in depressed mood, will develop more effective coping skills to manage depressive symptoms, will develop healthy cognitive patterns and beliefs, will increase ability to function adaptively and will continue to take medications as prescribed / participate in supportive activities and services    Status: Continued - Date(s): 3/5/2021    Objective #B: Patient will experience a reduction in anxiety, will develop more effective coping skills to manage anxiety symptoms, will develop healthy cognitive patterns and beliefs and will increase ability to function adaptively  Status: Continued - Date(s): 3/5/2021    Objective #C: Patient will develop better understanding of triggers and coping strategies to stabilize mood  Status: Continued - Date(s):3/5/2021    Goal 2:  Patient will identify and increase engagement in valued activity, i.e. improving social connections/relationships, pursuing occupational goals or personally meaningful pursuits, exploration of meaning in life.     Objective #A: Patient will identify meaningful activity in social, occupational and  personal goals, and increase behavioral activation around these goals   Status: Continued - Date(s): 3/5/2021    Objective #B: Patient will address relationship difficulties in a more adaptive manner  Status: Continued - Date(s): 3/5/2021    Objective #C: Patient will develop coping/problem-solving skills to facilitate more adaptive adjustment and will  effectively address problems that interfere with adaptive functioning  Status: Continued - Date(s): 3/5/2021      Possible Therapeutic Intervention(s)  Psycho-education regarding mental health diagnoses and treatment options      Skills training    Explore skills useful to client in current situation.    Skills include assertiveness, communication, conflict management, problem-solving, relaxation, etc.    Solution-Focused Therapy    Explore patterns in patient's relationships and discuss options for new behaviors.    Explore patterns in patient's actions and choices and discuss options for new behaviors.    Cognitive-behavioral Therapy    Discuss common cognitive distortions, identify them in patient's life.    Explore ways to challenge, replace, and act against these cognitions.    Acceptance and Commitment Therapy    Explore and identify important values in patient's life.    Discuss ways to commit to behavioral activation around these values.    Psychodynamic psychotherapy    Discuss patient's emotional dynamics and issues and how they impact behaviors.    Explore patient's history of relationships and how they impact present behaviors.    Explore how to work with and make changes in these schemas and patterns.    Narrative Therapy    Explore the patient's story of his/her life from his/her perspective.    Explore alternate ways of understanding their experience, identifying exceptions, developing new themes.    Interpersonal Psychotherapy    Explore patterns in relationships that are effective or ineffective at helping patient reach their goals, find satisfying experience.    Discuss new patterns or behaviors to engage in for improved social functioning.    Behavioral Activation    Discuss steps patient can take to become more involved in meaningful activity.    Identify barriers to these activities and explore possible solutions.    Mindfulness-Based Strategies    Discuss skills based on development and  application of mindfulness.    Skills drawn from compassion-focused therapy, dialectical behavior therapy, mindfulness-based stress reduction, mindfulness-based cognitive therapy, etc.      We have developed these goals together during our work to this point. Patient has assisted in the development of these goals and has agreed to this treatment plan.       Edmundo Hansen LP  3/5/2021

## 2021-07-08 ENCOUNTER — VIRTUAL VISIT (OUTPATIENT)
Dept: BEHAVIORAL HEALTH | Facility: CLINIC | Age: 41
End: 2021-07-08
Payer: COMMERCIAL

## 2021-07-08 DIAGNOSIS — F33.0 MAJOR DEPRESSIVE DISORDER, RECURRENT EPISODE, MILD (H): Primary | ICD-10-CM

## 2021-07-08 PROCEDURE — 90834 PSYTX W PT 45 MINUTES: CPT | Mod: 95 | Performed by: PSYCHOLOGIST

## 2021-07-08 NOTE — PROGRESS NOTES
"[facility]: Integrated Behavioral Health   July 8, 2021      Behavioral Health Clinician Progress Note    Patient Name: Shadia Mcgrath           Service Type: Phone Visit      Service Location:  Phone Visit      Session Start Time: 2:00  Session End Time: 2:40      Session Length: 38 - 52      Attendees: Patient     Visit Activities (Refresh list every visit): Phone Encounter    Shadia Mcgrath is a 40 year old female who is being evaluated via a telephone visit.      The patient has been notified of the following:     \"We have found that certain health care needs can be provided without the need for a face to face visit.  This service lets us provide the care you need with a short phone conversation.      I will have full access to your [place] medical record during this entire phone call.   I will be taking notes for your medical record.     Since this is like an office visit, we will bill your insurance company for this service.  Please check with your medical insurance if this type of telephone visit/virtual care is covered.  You may be responsible for the cost of this service if insurance coverage is denied.      There are potential benefits and risks of telephone visits (e.g. limits to patient confidentiality) that differ from in-person visits.?  Confidentiality still applies for telephone services, and nobody will record the visit.  It is important to be in a quiet, private space that is free of distractions (including cell phone or other devices) during the visit.??     If during the course of the call I believe a telephone visit is not appropriate, you will not be charged for this service\"    Consent has been obtained for this service by care team member: yes.    Diagnostic Assessment Date: 10/5/2020  Treatment Plan Review Date: 9/10/2021  See Flowsheets for today's PHQ-9 and ZACH-7 results  Previous PHQ-9:   PHQ-9 SCORE 8/27/2020   PHQ-9 Total Score 13 " "    Previous ZACH-7: No flowsheet data found.     MELVIN LEVEL:  No flowsheet data found.    DATA  Extended Session (60+ minutes): No  Interactive Complexity: No  Crisis: No    Treatment Objective(s) Addressed in This Session:  Target Behavior(s): disease management/lifestyle changes related to psychosocial and relational stress    Depressed Mood: Decrease frequency and intensity of feeling down, depressed, hopeless  Identify negative self-talk and behaviors: challenge core beliefs, myths, and actions  Relationship Problems: will address relationship difficulties in a more adaptive manner  Anger Management: will learn strategies to resolve conflict adaptively    Current Stressors / Issues:  [facility] met with Shadia today over the phone to continue supporting her treatment of depression and reported difficulties with anger and relationship problems. She was accompanied on the phone by her friend Marisabel who could be heard talking to Shaida at times during the call. Shadia provided several updates about her transition to a new employer and how she is experiencing some stress with this. She also described some relationship concerns, which Marisabel described how Shadia can be quick to anger at times. This writer continued to help Shadia develop adaptive responses to anger and we explored how anger can lead to depressive experiences (e.g. guilt). Looked at more adaptive communication strategies she can continue to develop to more adaptively resolve conflict and feel better about how she handles difficult interactions with others. Looked at in particular how she would prefer interactions to unfold (e.g. \"How would I prefer this situation to have been handled two hours from now). Discussed ways of utilizing these ideas in her daily interactions with others as well.         Progress on Treatment Objective(s) / Homework:  Satisfactory progress - ACTION (Actively working towards change); Intervened by reinforcing change plan / " affirming steps taken    Strategies to manage anger and depression/relationship conflict discussed today    Care Plan review completed: Yes    Medication Review:  No changes to current psychiatric medication(s)    Medication Compliance:  Yes    Changes in Health Issues:   None reported    Chemical Use Review:   Substance Use: Chemical use reviewed, no active concerns identified      Tobacco Use: No current tobacco use.      Assessment: Current Emotional / Mental Status (status of significant symptoms):  Risk status (Self / Other harm or suicidal ideation)  Patient has had a history of suicidal ideation: SI as a youth only and suicide attempts: one prior attempt at age 15; none recent  Patient denies current fears or concerns for personal safety.     Patient denies current or recent suicidal ideation or behaviors.  Patient denies current or recent homicidal ideation or behaviors.  Patient denies current or recent self injurious behavior or ideation.  Patient denies other safety concerns.     A safety and risk management plan has not been developed at this time, however patient was encouraged to call [place] [phone] should there be a change in any of these risk factors.    [place] Suicide Severity Rating Scale (Lifetime/Recent)  [place] Suicide Severity Rating (Lifetime/Recent) 9/21/2020   1. Wish to be Dead (Lifetime) Yes   1. Wish to be Dead (Recent) No   2. Non-Specific Active Suicidal Thoughts (Lifetime) Yes   2. Non-Specific Active Suicidal Thoughts (Recent) No   3. Active Suicidal Ideation with any Methods (Not Plan) Without Intent to Act (Lifetime) Yes   3. Active Suicidal Ideation with any Methods (Not Plan) Without Intent to Act (Recent) No   4. Active Suicidal Ideation with Some Intent to Act, Without Specific Plan (Lifetime) Yes   4. Active Suicidal Ideation with Some Intent to Act, Without Specific Plan (Recent) No   5. Active Suicidal Ideation with Specific Plan and Intent (Lifetime) Yes   5. Active  Suicidal Ideation with Specific Plan and Intent (Recent) No   Most Severe Ideation Rating (Lifetime) 4   Frequency (Lifetime) 4   Duration (Lifetime) 4   Controllability (Lifetime) 3   Protective Factors  (Lifetime) 2   Reasons for Ideation (Lifetime) 4   Most Severe Ideation Rating (Past Month) NA   Frequency (Past Month) NA   Duration (Past Month) NA   Controllability (Past Month) NA   Protective Factors (Past Month) NA   Reasons for Ideation (Past Month) NA   Actual Attempt (Lifetime) Yes   Actual Attempt (Past 3 Months) No   Has subject engaged in non-suicidal self-injurious behavior? (Lifetime) No   Has subject engaged in non-suicidal self-injurious behavior? (Past 3 Months) No   Interrupted Attempts (Lifetime) No   Interrupted Attempts (Past 3 Months) No   Aborted or Self-Interrupted Attempt (Lifetime) No   Aborted or Self-Interrupted Attempt (Past 3 Months) No   Preparatory Acts or Behavior (Lifetime) No   Preparatory Acts or Behavior (Past 3 Months) No   Most Recent Attempt Actual Lethality Code 2   Most Lethal Attempt Actual Lethality Code 2   Initial/First Attempt Actual Lethality Code 2       Appearance:   Unable to assess   Eye Contact:   Unable to assess   Psychomotor Behavior: Unable to assess   Attitude:   Cooperative  Friendly Pleasant  Orientation:   All  Speech   Rate / Production: Normal    Volume:  Normal   Mood:    Angry  Depressed   Affect:    Appropriate   Thought Content:  Clear   Thought Form:  Coherent  Logical   Insight:    Good     Diagnoses:  1. Major depressive disorder, recurrent episode, mild (H)        Collateral Reports Completed:   Not Applicable    Plan: (Homework, other):  Shadia was given information about behavioral services and encouraged to schedule a follow up appointment with the clinic [facility] in 2 weeks.  Shadia provided additional anger management strategies today. She was previously  recommended to schedule a follow-up with her PCP to review medication options for better  mood management. CD Recommendations: No indications of CD issues.     Edmundo Hansen Psy.D, LP   Behavioral Health Clinician   [facility]     7/8/2021      ______________________________________________________________________    [facility] Integrated Behavioral Health Treatment Plan    Client's Name: Shadia Mcgrath  YOB: 1980    Date: 7/10/2021    DSM-V Diagnoses: 296.31 (F33.0) Major Depressive Disorder, Recurrent Episode, Mild _  WHODAS: none on file  Clinical Global Impressions  First:  Considering your total clinical experience with this particular patient population, how severe are the patient's symptoms at this time?: 4 (7/23/2021 11:08 AM)      Most recent:  Compared to the patient's condition at the START of treatment, this patient's condition is: 3 (7/23/2021 11:08 AM)      Referral / Collaboration:  Will collaborate with care team as indicated during treatment.    Anticipated number of session or this episode of care: 10+      MeasurableTreatment Goal(s) related to diagnosis / functional impairment(s)  Goal 1:  Patient will experience a reduction in depressive and anxious symptoms, along with a corresponding increase in positive emotion and life satisfaction.    Objective #A: Patient will experience a reduction in depressed mood, will develop more effective coping skills to manage depressive symptoms, will develop healthy cognitive patterns and beliefs, will increase ability to function adaptively and will continue to take medications as prescribed / participate in supportive activities and services    Status: Continued - Date(s):7/10/2021    Objective #B: Patient will experience a reduction in anxiety, will develop more effective coping skills to manage anxiety symptoms, will develop healthy cognitive patterns and beliefs and will increase ability to function adaptively  Status: Continued - Date(s): 7/10/2021    Objective #C: Patient will develop better  understanding of triggers and coping strategies to stabilize mood  Status: Continued - Date(s): 7/10/2021    Goal 2:  Patient will identify and increase engagement in valued activity, i.e. improving social connections/relationships, pursuing occupational goals or personally meaningful pursuits, exploration of meaning in life.     Objective #A: Patient will identify meaningful activity in social, occupational and  personal goals, and increase behavioral activation around these goals   Status: Continued - Date(s): 7/10/2021    Objective #B: Patient will address relationship difficulties in a more adaptive manner  Status: Continued - Date(s): 7/10/2021    Objective #C: Patient will develop coping/problem-solving skills to facilitate more adaptive adjustment and will effectively address problems that interfere with adaptive functioning  Status: Continued - Date(s): 7/10/2021      Possible Therapeutic Intervention(s)  Psycho-education regarding mental health diagnoses and treatment options      Skills training    Explore skills useful to client in current situation.    Skills include assertiveness, communication, conflict management, problem-solving, relaxation, etc.    Solution-Focused Therapy    Explore patterns in patient's relationships and discuss options for new behaviors.    Explore patterns in patient's actions and choices and discuss options for new behaviors.    Cognitive-behavioral Therapy    Discuss common cognitive distortions, identify them in patient's life.    Explore ways to challenge, replace, and act against these cognitions.    Acceptance and Commitment Therapy    Explore and identify important values in patient's life.    Discuss ways to commit to behavioral activation around these values.    Psychodynamic psychotherapy    Discuss patient's emotional dynamics and issues and how they impact behaviors.    Explore patient's history of relationships and how they impact present behaviors.    Explore how  to work with and make changes in these schemas and patterns.    Narrative Therapy    Explore the patient's story of his/her life from his/her perspective.    Explore alternate ways of understanding their experience, identifying exceptions, developing new themes.    Interpersonal Psychotherapy    Explore patterns in relationships that are effective or ineffective at helping patient reach their goals, find satisfying experience.    Discuss new patterns or behaviors to engage in for improved social functioning.    Behavioral Activation    Discuss steps patient can take to become more involved in meaningful activity.    Identify barriers to these activities and explore possible solutions.    Mindfulness-Based Strategies    Discuss skills based on development and application of mindfulness.    Skills drawn from compassion-focused therapy, dialectical behavior therapy, mindfulness-based stress reduction, mindfulness-based cognitive therapy, etc.      We have developed these goals together during our work to this point. Patient has assisted in the development of these goals and has agreed to this treatment plan.       Edmundo Hansen LP  7/10/2021

## 2021-07-26 ENCOUNTER — VIRTUAL VISIT (OUTPATIENT)
Dept: PULMONOLOGY | Facility: CLINIC | Age: 41
End: 2021-07-26
Payer: COMMERCIAL

## 2021-07-26 DIAGNOSIS — J45.909 SEVERE ASTHMA, UNSPECIFIED WHETHER COMPLICATED, UNSPECIFIED WHETHER PERSISTENT: Primary | ICD-10-CM

## 2021-07-26 DIAGNOSIS — R05.9 COUGH: ICD-10-CM

## 2021-07-26 DIAGNOSIS — Z87.01 H/O RECURRENT PNEUMONIA: ICD-10-CM

## 2021-07-26 PROCEDURE — 99214 OFFICE O/P EST MOD 30 MIN: CPT | Mod: 95

## 2021-07-26 RX ORDER — AZITHROMYCIN 250 MG/1
250 TABLET, FILM COATED ORAL DAILY
Qty: 30 TABLET | Refills: 6 | Status: SHIPPED | OUTPATIENT
Start: 2021-07-26 | End: 2022-03-11

## 2021-07-26 RX ORDER — ESCITALOPRAM OXALATE 10 MG/1
10 TABLET ORAL DAILY
COMMUNITY

## 2021-07-26 RX ORDER — LISDEXAMFETAMINE DIMESYLATE 60 MG/1
60 CAPSULE ORAL EVERY MORNING
COMMUNITY

## 2021-07-26 ASSESSMENT — ASTHMA QUESTIONNAIRES
QUESTION_1 LAST FOUR WEEKS HOW MUCH OF THE TIME DID YOUR ASTHMA KEEP YOU FROM GETTING AS MUCH DONE AT WORK, SCHOOL OR AT HOME: SOME OF THE TIME
QUESTION_2 LAST FOUR WEEKS HOW OFTEN HAVE YOU HAD SHORTNESS OF BREATH: ONCE A DAY
QUESTION_5 LAST FOUR WEEKS HOW WOULD YOU RATE YOUR ASTHMA CONTROL: SOMEWHAT CONTROLLED
QUESTION_3 LAST FOUR WEEKS HOW OFTEN DID YOUR ASTHMA SYMPTOMS (WHEEZING, COUGHING, SHORTNESS OF BREATH, CHEST TIGHTNESS OR PAIN) WAKE YOU UP AT NIGHT OR EARLIER THAN USUAL IN THE MORNING: FOUR OR MORE NIGHTS A WEEK
QUESTION_4 LAST FOUR WEEKS HOW OFTEN HAVE YOU USED YOUR RESCUE INHALER OR NEBULIZER MEDICATION (SUCH AS ALBUTEROL): THREE OR MORE TIMES PER DAY
ACT_TOTALSCORE: 10
EMERGENCY_ROOM_LAST_YEAR_TOTAL: THREE OR MORE

## 2021-07-26 NOTE — PROGRESS NOTES
Shadia is a 41 year old who is being evaluated via a billable video visit.      How would you like to obtain your AVS? MyChart  If the video visit is dropped, the invitation should be resent by: Other e-mail: [email or web address]  Will anyone else be joining your video visit? No      Video Start Time: 12:32  Video-Visit Details    Type of service:  Video Visit    Video End Time:12:42 PM    Originating Location (pt. Location): Home    Distant Location (provider location):  [facility]     Platform used for Video Visit: [facility]  Pulmonary Medicine  Visit Clinic Note  July 26th, 2021         ASSESSMENT & PLAN     History of asthma  Hypogammaglobulinemia    Symptoms have been under good control on the current regimen and so we will keep it where it is at. I think the idea of going to see an allergist is less of a priority at this point, since she has not had any recent pneumonia as. This is always something that could be considered later on if she develops those symptoms again. I would check her immunoglobulin levels at that point time. For now, I think she should continue daily azithromycin. I will set up an appointment with her in about 6 months and if everything is stable, we could consider stopping the azithromycin. We could also consider having her follow-up with a local pulmonologist at that point as well.      Kleber Guy MD          Today's visit note:     Chief Complaint: Shadia Mcgrath is a 41 year old year old female who is being seen for asthma    HISTORY OF PRESENT ILLNESS:      Is been 3 months since her last clinic visit, and she has had decent respiratory health since then. She takes Breo every day, daily azithromycin, and albuterol 3-4 times a week. She is pleased with her symptoms, and they are much better than they were a year ago. She did not schedule a visit with the allergist to discuss the possible  immunoglobulin therapy. She does plan to get a knee arthroplasty performed tomorrow. There has been no other change in her respiratory health recently.           Past Medical and Surgical History:     Past Medical History:  Past Medical History:   Diagnosis Date     ADHD (attention deficit hyperactivity disorder)     Asthma   Persistent asthma, steroid dependent     Bilateral chronic knee pain     Bipolar 1 disorder (HCC)     Delayed emergence from general anesthesia     Depression     Fibromyalgia     GERD (gastroesophageal reflux disease)     HCD (health care directive) 10/10/2017     DAPHNE (obstructive sleep apnea)     Osteomyelitis (HCC)     Osteopenia     Seasonal allergic rhinitis     Seizures (HCC)   ? partial seizures / none for somr time. due brain trauma obtained in 2016     Vitamin D deficiency     Past Surgical History:   Procedure Laterality Date     GASTRIC FUNDOPLICATION 1998     KNEE SURGERY Right 4/2009     KNEE SURGERY Left 12/2004     REMOVAL GALLBLADDER     SIMON-EN-Y GASTRIC BYPASS 12/06     SINUS SURGERY     TOTAL KNEE ARTHROPLASTY Left 10/10/2017   Procedure: LEFT ARTHROPLASTY TOTAL KNEE; Surgeon: Yakov Choi MD; Location: [facility] MAIN OR         Family History:     Family History   Problem Relation Age of Onset     No Known Problems Mother      No Known Problems Father      No Known Problems Sister      No Known Problems Brother             Social History:     Social History     Socioeconomic History     Marital status: Single     Spouse name: Not on file     Number of children: Not on file     Years of education: Not on file     Highest education level: Not on file   Occupational History     Not on file   Tobacco Use     Smoking status: Never Smoker     Smokeless tobacco: Never Used   Substance and Sexual Activity     Alcohol use: Yes     Comment: occassional      Drug use: Yes     Types: Marijuana     Comment: rare     Sexual activity: Not on file   Other Topics Concern      Parent/sibling w/ CABG, MI or angioplasty before 65F 55M? Not Asked   Social History Narrative    10/29/20        ENVIRONMENTAL HISTORY: [place] The home is heated with a forced air. They does have central air conditioning. The patient's bedroom is furnished with hard ryan in bedroom.  Pets inside the house include 3 cat(s). There is no history of cockroach or mice infestation. There is/are 0 smokers in the house.  The house does not have a damp basement.      Social Determinants of Health     Financial Resource Strain:      Difficulty of Paying Living Expenses:    Food Insecurity:      Worried About Running Out of Food in the Last Year:      Ran Out of Food in the Last Year:    Transportation Needs:      Lack of Transportation (Medical):      Lack of Transportation (Non-Medical):    Physical Activity:      Days of Exercise per Week:      Minutes of Exercise per Session:    Stress:      Feeling of Stress :    Social Connections:      Frequency of Communication with Friends and Family:      Frequency of Social Gatherings with Friends and Family:      Attends [employer] Services:      Active Member of Clubs or Organizations:      Attends Club or Organization Meetings:      Marital Status:    Intimate Partner Violence:      Fear of Current or Ex-Partner:      Emotionally Abused:      Physically Abused:      Sexually Abused:             Medications:     Current Outpatient Medications   Medication     acetaminophen (TYLENOL) 500 MG tablet     adapalene (DIFFERIN) 0.1 % external gel     albuterol (PROVENTIL) (2.5 MG/3ML) 0.083% neb solution     azelastine (ASTELIN) 0.1 % nasal spray     azithromycin (ZITHROMAX) 250 MG tablet     budesonide (PULMICORT FLEXHALER) 180 MCG/ACT inhaler     Cetirizine HCl (ZYRTEC PO)     chlorhexidine (PERIDEX) 0.12 % solution     cholecalciferol ( ULTRA STRENGTH) 50 MCG (2000 UT) CAPS     escitalopram (LEXAPRO) 10 MG tablet      fluticasone (FLONASE) 50 MCG/ACT nasal spray     Fluticasone Furoate (ARNUITY ELLIPTA IN)     gabapentin (NEURONTIN) 300 MG capsule     ibuprofen (ADVIL/MOTRIN) 800 MG tablet     lamoTRIgine (LAMICTAL) 25 MG tablet     lisdexamfetamine (VYVANSE) 60 MG capsule     methocarbamol (ROBAXIN) 500 MG tablet     minoxidil (ROGAINE) 2 % external solution     Montelukast Sodium (SINGULAIR PO)     mupirocin (BACTROBAN) 2 % external ointment     omeprazole (PRILOSEC) 20 MG DR capsule     amphetamine-dextroamphetamine (ADDERALL) 20 MG tablet     No current facility-administered medications for this visit.            Review of Systems:     A complete review of systems was otherwise negative except as noted in the HPI.    PHYSICAL EXAM:  There were no vitals taken for this visit.     This was a video visit exam         Data:   All laboratory and imaging data reviewed.      PFT:   Pulmonary function testing performed on January 20, 2021 shows an FEV1/FVC ratio of 0.78.  Her FVC is 3.77 L which is 93% predicted, and the FEV1 is 2.95 L which is 90% predicted.  The residual volume is 116% predicted and the total lung capacity is 105% predicted.  Diffusion capacity is 130% predicted.       PFT Interpretation:  No airflow obstruction.  Normal diffusion capacity.  There is improved vital capacity and FEV1 when compared to spirometry in September 2019.    Chest CT:   9/2019: tree-in-bud opacities. Left lower lobe consolidative changes.

## 2021-07-27 ENCOUNTER — VIRTUAL VISIT (OUTPATIENT)
Dept: BEHAVIORAL HEALTH | Facility: CLINIC | Age: 41
End: 2021-07-27
Payer: COMMERCIAL

## 2021-07-27 DIAGNOSIS — Z53.9 ERRONEOUS ENCOUNTER--DISREGARD: Primary | ICD-10-CM

## 2021-07-27 ASSESSMENT — ASTHMA QUESTIONNAIRES: ACT_TOTALSCORE: 10

## 2021-07-27 NOTE — PROGRESS NOTES
Appointment Cancelled    Appointment was cancelled by provider due to Shadia currently being hospitalized due to a arthoplasty procedure completed today. Explained to Shadia that this provider does not have hospital privledges to provide care and her appointment would have to be rescheduled upon her discharge. We rescheduled for 8/6/21 at 3:00.     Shadia briefly reported her new Lexapro medication is helpful managing mood and she is denying SI at this time.        Edmundo Hansen Psy.D, LP   Behavioral Health Clinician   -[facility]     July 27, 2021      Appointment cancelled.

## 2021-08-06 ENCOUNTER — VIRTUAL VISIT (OUTPATIENT)
Dept: BEHAVIORAL HEALTH | Facility: CLINIC | Age: 41
End: 2021-08-06
Payer: COMMERCIAL

## 2021-08-06 DIAGNOSIS — Z53.9 ERRONEOUS ENCOUNTER--DISREGARD: Primary | ICD-10-CM

## 2021-08-06 NOTE — PROGRESS NOTES
Appointment Rescheduled by Patient    Shadia indicated on the call today she did not have enough privacy at home to discuss an important topic and wished to reschedule. [facility] validated her preference for privacy and agreed to reschedule her for 8/11/2021.     Edmundo Hansen Psy.D, LP   Behavioral Health Clinician   [facility]     August 6, 2021

## 2021-08-11 ENCOUNTER — VIRTUAL VISIT (OUTPATIENT)
Dept: BEHAVIORAL HEALTH | Facility: CLINIC | Age: 41
End: 2021-08-11
Payer: COMMERCIAL

## 2021-08-11 DIAGNOSIS — F33.0 MAJOR DEPRESSIVE DISORDER, RECURRENT EPISODE, MILD (H): Primary | ICD-10-CM

## 2021-08-11 PROCEDURE — 90834 PSYTX W PT 45 MINUTES: CPT | Mod: 95 | Performed by: PSYCHOLOGIST

## 2021-08-11 NOTE — PROGRESS NOTES
"[facility]: Integrated Behavioral Health   August 11, 2021        Behavioral Health Clinician Progress Note    Patient Name: Shadia Mcgrath           Service Type: Phone Visit      Service Location:  Phone Visit      Session Start Time: 2:00  Session End Time: 2:40      Session Length: 38 - 52      Attendees: Patient     Visit Activities (Refresh list every visit): Phone Encounter    Shadia Mcgrath is a 40 year old female who is being evaluated via a telephone visit.      The patient has been notified of the following:     \"We have found that certain health care needs can be provided without the need for a face to face visit.  This service lets us provide the care you need with a short phone conversation.      I will have full access to your [facility] medical record during this entire phone call.   I will be taking notes for your medical record.     Since this is like an office visit, we will bill your insurance company for this service.  Please check with your medical insurance if this type of telephone visit/virtual care is covered.  You may be responsible for the cost of this service if insurance coverage is denied.      There are potential benefits and risks of telephone visits (e.g. limits to patient confidentiality) that differ from in-person visits.?  Confidentiality still applies for telephone services, and nobody will record the visit.  It is important to be in a quiet, private space that is free of distractions (including cell phone or other devices) during the visit.??     If during the course of the call I believe a telephone visit is not appropriate, you will not be charged for this service\"    Consent has been obtained for this service by care team member: yes.    Diagnostic Assessment Date: 10/5/2020  Treatment Plan Review Date: 9/10/2021  See Flowsheets for today's PHQ-9 and ZACH-7 results  Previous PHQ-9:   PHQ-9 SCORE 8/27/2020   PHQ-9 Total Score 13 "     Previous ZACH-7: No flowsheet data found.     MELVIN LEVEL:  No flowsheet data found.    DATA  Extended Session (60+ minutes): No  Interactive Complexity: No  Crisis: No    Treatment Objective(s) Addressed in This Session:  Target Behavior(s): disease management/lifestyle changes related to psychosocial and relational stress    Depressed Mood: Decrease frequency and intensity of feeling down, depressed, hopeless  Identify negative self-talk and behaviors: challenge core beliefs, myths, and actions  Relationship Problems: will address relationship difficulties in a more adaptive manner  Anger Management: will learn strategies to resolve conflict adaptively    Current Stressors / Issues:  [facility] met with Shadia today over the phone to continue supporting her treatment of depression and reported difficulties with anger and relationship problems. Her ongoing concerns with interpersonal relationships and matters pertaining to work were reviewed and addressed today. We continued to explore ways she can better manage her anger in relationships and resolve relational conflicts more adaptively. Shadia discussed a few ways she has tried strategies we have discussed and what she is actively working on. [facility] reinforced steps she has taken, things she has tried doing and we focused on areas of success.       Progress on Treatment Objective(s) / Homework:  Satisfactory progress - ACTION (Actively working towards change); Intervened by reinforcing change plan / affirming steps taken    Strategies to manage anger and depression/relationship conflict discussed today    Care Plan review completed: Yes    Medication Review:  No changes to current psychiatric medication(s)    Medication Compliance:  Yes    Changes in Health Issues:   None reported    Chemical Use Review:   Substance Use: Chemical use reviewed, no active concerns identified      Tobacco Use: No current tobacco use.      Assessment: Current Emotional / Mental Status  (status of significant symptoms):  Risk status (Self / Other harm or suicidal ideation)  Patient has had a history of suicidal ideation: SI as a youth only and suicide attempts: one prior attempt at age 15; none recent  Patient denies current fears or concerns for personal safety.     Patient denies current or recent suicidal ideation or behaviors.  Patient denies current or recent homicidal ideation or behaviors.  Patient denies current or recent self injurious behavior or ideation.  Patient denies other safety concerns.     A safety and risk management plan has not been developed at this time, however patient was encouraged to call [place] [phone] should there be a change in any of these risk factors.    Vanderbilt Suicide Severity Rating Scale (Lifetime/Recent)  Vanderbilt Suicide Severity Rating (Lifetime/Recent) 9/21/2020   1. Wish to be Dead (Lifetime) Yes   1. Wish to be Dead (Recent) No   2. Non-Specific Active Suicidal Thoughts (Lifetime) Yes   2. Non-Specific Active Suicidal Thoughts (Recent) No   3. Active Suicidal Ideation with any Methods (Not Plan) Without Intent to Act (Lifetime) Yes   3. Active Suicidal Ideation with any Methods (Not Plan) Without Intent to Act (Recent) No   4. Active Suicidal Ideation with Some Intent to Act, Without Specific Plan (Lifetime) Yes   4. Active Suicidal Ideation with Some Intent to Act, Without Specific Plan (Recent) No   5. Active Suicidal Ideation with Specific Plan and Intent (Lifetime) Yes   5. Active Suicidal Ideation with Specific Plan and Intent (Recent) No   Most Severe Ideation Rating (Lifetime) 4   Frequency (Lifetime) 4   Duration (Lifetime) 4   Controllability (Lifetime) 3   Protective Factors  (Lifetime) 2   Reasons for Ideation (Lifetime) 4   Most Severe Ideation Rating (Past Month) NA   Frequency (Past Month) NA   Duration (Past Month) NA   Controllability (Past Month) NA   Protective Factors (Past Month) NA   Reasons for Ideation (Past Month) NA   Actual  Attempt (Lifetime) Yes   Actual Attempt (Past 3 Months) No   Has subject engaged in non-suicidal self-injurious behavior? (Lifetime) No   Has subject engaged in non-suicidal self-injurious behavior? (Past 3 Months) No   Interrupted Attempts (Lifetime) No   Interrupted Attempts (Past 3 Months) No   Aborted or Self-Interrupted Attempt (Lifetime) No   Aborted or Self-Interrupted Attempt (Past 3 Months) No   Preparatory Acts or Behavior (Lifetime) No   Preparatory Acts or Behavior (Past 3 Months) No   Most Recent Attempt Actual Lethality Code 2   Most Lethal Attempt Actual Lethality Code 2   Initial/First Attempt Actual Lethality Code 2       Appearance:   Unable to assess   Eye Contact:   Unable to assess   Psychomotor Behavior: Unable to assess   Attitude:   Cooperative  Friendly Pleasant  Orientation:   All  Speech   Rate / Production: Normal    Volume:  Normal   Mood:    Angry  Depressed   Affect:    Appropriate   Thought Content:  Clear   Thought Form:  Coherent  Logical   Insight:    Good     Diagnoses:  1. Major depressive disorder, recurrent episode, mild (H)        Collateral Reports Completed:   Not Applicable    Plan: (Homework, other):  Shadia was given information about behavioral services and encouraged to schedule a follow up appointment with the clinic [facility] in 2 weeks.  Shadia provided additional anger management strategies today.  CD Recommendations: No indications of CD issues.     Edmundo Hansen Psy.D, LP   Behavioral Health Clinician   [facility]     8/11/2021      ______________________________________________________________________    [facility] Integrated Behavioral Health Treatment Plan    Client's Name: Shadia Mcgrath  YOB: 1980    Date: 7/10/2021    DSM-V Diagnoses: 296.31 (F33.0) Major Depressive Disorder, Recurrent Episode, Mild _  WHODAS: none on file  Clinical Global Impressions  First:  Considering your total clinical experience with this  particular patient population, how severe are the patient's symptoms at this time?: 4 (7/23/2021 11:08 AM)      Most recent:  Compared to the patient's condition at the START of treatment, this patient's condition is: 3 (7/23/2021 11:08 AM)      Referral / Collaboration:  Will collaborate with care team as indicated during treatment.    Anticipated number of session or this episode of care: 10+      MeasurableTreatment Goal(s) related to diagnosis / functional impairment(s)  Goal 1:  Patient will experience a reduction in depressive and anxious symptoms, along with a corresponding increase in positive emotion and life satisfaction.    Objective #A: Patient will experience a reduction in depressed mood, will develop more effective coping skills to manage depressive symptoms, will develop healthy cognitive patterns and beliefs, will increase ability to function adaptively and will continue to take medications as prescribed / participate in supportive activities and services    Status: Continued - Date(s):7/10/2021    Objective #B: Patient will experience a reduction in anxiety, will develop more effective coping skills to manage anxiety symptoms, will develop healthy cognitive patterns and beliefs and will increase ability to function adaptively  Status: Continued - Date(s): 7/10/2021    Objective #C: Patient will develop better understanding of triggers and coping strategies to stabilize mood  Status: Continued - Date(s): 7/10/2021    Goal 2:  Patient will identify and increase engagement in valued activity, i.e. improving social connections/relationships, pursuing occupational goals or personally meaningful pursuits, exploration of meaning in life.     Objective #A: Patient will identify meaningful activity in social, occupational and  personal goals, and increase behavioral activation around these goals   Status: Continued - Date(s): 7/10/2021    Objective #B: Patient will address relationship difficulties in a  more adaptive manner  Status: Continued - Date(s): 7/10/2021    Objective #C: Patient will develop coping/problem-solving skills to facilitate more adaptive adjustment and will effectively address problems that interfere with adaptive functioning  Status: Continued - Date(s): 7/10/2021      Possible Therapeutic Intervention(s)  Psycho-education regarding mental health diagnoses and treatment options      Skills training    Explore skills useful to client in current situation.    Skills include assertiveness, communication, conflict management, problem-solving, relaxation, etc.    Solution-Focused Therapy    Explore patterns in patient's relationships and discuss options for new behaviors.    Explore patterns in patient's actions and choices and discuss options for new behaviors.    Cognitive-behavioral Therapy    Discuss common cognitive distortions, identify them in patient's life.    Explore ways to challenge, replace, and act against these cognitions.    Acceptance and Commitment Therapy    Explore and identify important values in patient's life.    Discuss ways to commit to behavioral activation around these values.    Psychodynamic psychotherapy    Discuss patient's emotional dynamics and issues and how they impact behaviors.    Explore patient's history of relationships and how they impact present behaviors.    Explore how to work with and make changes in these schemas and patterns.    Narrative Therapy    Explore the patient's story of his/her life from his/her perspective.    Explore alternate ways of understanding their experience, identifying exceptions, developing new themes.    Interpersonal Psychotherapy    Explore patterns in relationships that are effective or ineffective at helping patient reach their goals, find satisfying experience.    Discuss new patterns or behaviors to engage in for improved social functioning.    Behavioral Activation    Discuss steps patient can take to become more involved  in meaningful activity.    Identify barriers to these activities and explore possible solutions.    Mindfulness-Based Strategies    Discuss skills based on development and application of mindfulness.    Skills drawn from compassion-focused therapy, dialectical behavior therapy, mindfulness-based stress reduction, mindfulness-based cognitive therapy, etc.      We have developed these goals together during our work to this point. Patient has assisted in the development of these goals and has agreed to this treatment plan.       Edmundo Hansen LP  7/10/2021

## 2021-08-31 ENCOUNTER — VIRTUAL VISIT (OUTPATIENT)
Dept: BEHAVIORAL HEALTH | Facility: CLINIC | Age: 41
End: 2021-08-31
Payer: COMMERCIAL

## 2021-08-31 DIAGNOSIS — F33.41 MAJOR DEPRESSIVE DISORDER, RECURRENT EPISODE, IN PARTIAL REMISSION (H): Primary | ICD-10-CM

## 2021-08-31 PROCEDURE — 90832 PSYTX W PT 30 MINUTES: CPT | Mod: 95 | Performed by: PSYCHOLOGIST

## 2021-08-31 NOTE — PROGRESS NOTES
"[facility]: Integrated Behavioral Health   August 31, 2021        Behavioral Health Clinician Progress Note    Patient Name: Shadia Mcgrath           Service Type: Phone Visit      Service Location:  Phone Visit      Session Start Time: 9:00  Session End Time: 9:16      Session Length: 38 - 52      Attendees: Patient     Visit Activities (Refresh list every visit): Phone Encounter    Shadia Mcgrath is a 40 year old female who is being evaluated via a telephone visit.      The patient has been notified of the following:     \"We have found that certain health care needs can be provided without the need for a face to face visit.  This service lets us provide the care you need with a short phone conversation.      I will have full access to your [facility] medical record during this entire phone call.   I will be taking notes for your medical record.     Since this is like an office visit, we will bill your insurance company for this service.  Please check with your medical insurance if this type of telephone visit/virtual care is covered.  You may be responsible for the cost of this service if insurance coverage is denied.      There are potential benefits and risks of telephone visits (e.g. limits to patient confidentiality) that differ from in-person visits.?  Confidentiality still applies for telephone services, and nobody will record the visit.  It is important to be in a quiet, private space that is free of distractions (including cell phone or other devices) during the visit.??     If during the course of the call I believe a telephone visit is not appropriate, you will not be charged for this service\"    Consent has been obtained for this service by care team member: yes.    Diagnostic Assessment Date: 10/5/2020  Treatment Plan Review Date: 9/10/2021  See Flowsheets for today's PHQ-9 and ZACH-7 results  Previous PHQ-9:   PHQ-9 SCORE 8/27/2020   PHQ-9 Total Score 13 " "    Previous ZACH-7: No flowsheet data found.     MELVIN LEVEL:  No flowsheet data found.    DATA  Extended Session (60+ minutes): No  Interactive Complexity: No  Crisis: No    Treatment Objective(s) Addressed in This Session:  Target Behavior(s): disease management/lifestyle changes related to psychosocial and relational stress    Depressed Mood: Increase interest, engagement, and pleasure in doing things  Decrease frequency and intensity of feeling down, depressed, hopeless   Review care progress    Current Stressors / Issues:  [facility] met with Shadia today over the phone to continue supporting her treatment of depression and reported difficulties with anger and relationship problems. Shadia reports today that she is doing well overall. She reports having a successful move to her new home and reports doing well at her job. When asked to describe her mood, she described feeling \"fabulous\" and overall upbeat and positive. She denied having specific agenda items to discuss today and states she is doing overall very well. We reviewed her care plan and discussed next steps. We agreed to move out her sessions to once per month to check-in and review progress. We set up another appointment for October and she was encouraged to call and schedule sooner if needed.     Interventions today included reviewing her care plan, supportive counseling, and affirmation of her steps taken toward adaptive change.       Progress on Treatment Objective(s) / Homework:  Satisfactory progress - ACTION (Actively working towards change); Intervened by reinforcing change plan / affirming steps taken    Care Plan review completed: Yes    Medication Review:  No changes to current psychiatric medication(s)    Medication Compliance:  Yes    Changes in Health Issues:   None reported    Chemical Use Review:   Substance Use: Chemical use reviewed, no active concerns identified      Tobacco Use: No current tobacco use.      Assessment: Current Emotional / " Mental Status (status of significant symptoms):  Risk status (Self / Other harm or suicidal ideation)  Patient has had a history of suicidal ideation: SI as a youth only and suicide attempts: one prior attempt at age 15; none recent  Patient denies current fears or concerns for personal safety.     Patient denies current or recent suicidal ideation or behaviors.  Patient denies current or recent homicidal ideation or behaviors.  Patient denies current or recent self injurious behavior or ideation.  Patient denies other safety concerns.     A safety and risk management plan has not been developed at this time, however patient was encouraged to call [place] [phone] should there be a change in any of these risk factors.    [place] Suicide Severity Rating Scale (Lifetime/Recent)  [place] Suicide Severity Rating (Lifetime/Recent) 9/21/2020   1. Wish to be Dead (Lifetime) Yes   1. Wish to be Dead (Recent) No   2. Non-Specific Active Suicidal Thoughts (Lifetime) Yes   2. Non-Specific Active Suicidal Thoughts (Recent) No   3. Active Suicidal Ideation with any Methods (Not Plan) Without Intent to Act (Lifetime) Yes   3. Active Suicidal Ideation with any Methods (Not Plan) Without Intent to Act (Recent) No   4. Active Suicidal Ideation with Some Intent to Act, Without Specific Plan (Lifetime) Yes   4. Active Suicidal Ideation with Some Intent to Act, Without Specific Plan (Recent) No   5. Active Suicidal Ideation with Specific Plan and Intent (Lifetime) Yes   5. Active Suicidal Ideation with Specific Plan and Intent (Recent) No   Most Severe Ideation Rating (Lifetime) 4   Frequency (Lifetime) 4   Duration (Lifetime) 4   Controllability (Lifetime) 3   Protective Factors  (Lifetime) 2   Reasons for Ideation (Lifetime) 4   Most Severe Ideation Rating (Past Month) NA   Frequency (Past Month) NA   Duration (Past Month) NA   Controllability (Past Month) NA   Protective Factors (Past Month) NA   Reasons for Ideation (Past  Month) NA   Actual Attempt (Lifetime) Yes   Actual Attempt (Past 3 Months) No   Has subject engaged in non-suicidal self-injurious behavior? (Lifetime) No   Has subject engaged in non-suicidal self-injurious behavior? (Past 3 Months) No   Interrupted Attempts (Lifetime) No   Interrupted Attempts (Past 3 Months) No   Aborted or Self-Interrupted Attempt (Lifetime) No   Aborted or Self-Interrupted Attempt (Past 3 Months) No   Preparatory Acts or Behavior (Lifetime) No   Preparatory Acts or Behavior (Past 3 Months) No   Most Recent Attempt Actual Lethality Code 2   Most Lethal Attempt Actual Lethality Code 2   Initial/First Attempt Actual Lethality Code 2       Appearance:   Unable to assess   Eye Contact:   Unable to assess   Psychomotor Behavior: Unable to assess   Attitude:   Cooperative  Friendly Pleasant  Orientation:   All  Speech   Rate / Production: Normal    Volume:  Normal   Mood:    Angry  Depressed   Affect:    Appropriate   Thought Content:  Clear   Thought Form:  Coherent  Logical   Insight:    Good     Diagnoses:  1. Major depressive disorder, recurrent episode, in partial remission (H)        Collateral Reports Completed:   Not Applicable    Plan: (Homework, other):  Shadia was given information about behavioral services and encouraged to schedule a follow up appointment with the clinic [facility] in 1 month. She was encouraged to schedule sooner if needed. CD Recommendations: No indications of CD issues.     Edmundo Hansen Psy.D, LP   Behavioral Health Clinician   [facility]     August 31, 2021      ______________________________________________________________________    [facility] Integrated Behavioral Health Treatment Plan    Client's Name: Shadia Mcgrath  YOB: 1980    Date: 8/31/2021    DSM-V Diagnoses: 296.31 (F33.0) Major Depressive Disorder, Recurrent Episode, Mild _  WHODAS: none on file  Clinical Global Impressions  First:  Considering your total  clinical experience with this particular patient population, how severe are the patient's symptoms at this time?: 2 (8/31/2021  9:22 AM)      Most recent:  Compared to the patient's condition at the START of treatment, this patient's condition is: 2 (8/31/2021  9:22 AM)      Referral / Collaboration:  Will collaborate with care team as indicated during treatment.    Anticipated number of session or this episode of care: 10+      MeasurableTreatment Goal(s) related to diagnosis / functional impairment(s)  Goal 1:  Patient will experience a reduction in depressive and anxious symptoms, along with a corresponding increase in positive emotion and life satisfaction.    Objective #A: Patient will experience a reduction in depressed mood, will develop more effective coping skills to manage depressive symptoms, will develop healthy cognitive patterns and beliefs, will increase ability to function adaptively and will continue to take medications as prescribed / participate in supportive activities and services    Status: Continued - Date(s):COMPLETED    Objective #B: Patient will experience a reduction in anxiety, will develop more effective coping skills to manage anxiety symptoms, will develop healthy cognitive patterns and beliefs and will increase ability to function adaptively  Status: Continued - Date(s): COMPLETED    Objective #C: Patient will develop better understanding of triggers and coping strategies to stabilize mood  Status: Continued - Date(s): COMPLETED    Goal 2:  Patient will identify and increase engagement in valued activity, i.e. improving social connections/relationships, pursuing occupational goals or personally meaningful pursuits, exploration of meaning in life.     Objective #A: Patient will identify meaningful activity in social, occupational and  personal goals, and increase behavioral activation around these goals   Status: Continued - Date(s): COMPLETED    Objective #B: Patient will address  relationship difficulties in a more adaptive manner  Status: Continued - Date(s): COMPLETED    Objective #C: Patient will develop coping/problem-solving skills to facilitate more adaptive adjustment and will effectively address problems that interfere with adaptive functioning  Status: Continued - Date(s):  August 31, 2021      Possible Therapeutic Intervention(s)  Psycho-education regarding mental health diagnoses and treatment options      Skills training    Explore skills useful to client in current situation.    Skills include assertiveness, communication, conflict management, problem-solving, relaxation, etc.    Solution-Focused Therapy    Explore patterns in patient's relationships and discuss options for new behaviors.    Explore patterns in patient's actions and choices and discuss options for new behaviors.    Cognitive-behavioral Therapy    Discuss common cognitive distortions, identify them in patient's life.    Explore ways to challenge, replace, and act against these cognitions.    Acceptance and Commitment Therapy    Explore and identify important values in patient's life.    Discuss ways to commit to behavioral activation around these values.    Psychodynamic psychotherapy    Discuss patient's emotional dynamics and issues and how they impact behaviors.    Explore patient's history of relationships and how they impact present behaviors.    Explore how to work with and make changes in these schemas and patterns.    Narrative Therapy    Explore the patient's story of his/her life from his/her perspective.    Explore alternate ways of understanding their experience, identifying exceptions, developing new themes.    Interpersonal Psychotherapy    Explore patterns in relationships that are effective or ineffective at helping patient reach their goals, find satisfying experience.    Discuss new patterns or behaviors to engage in for improved social functioning.    Behavioral Activation    Discuss steps  patient can take to become more involved in meaningful activity.    Identify barriers to these activities and explore possible solutions.    Mindfulness-Based Strategies    Discuss skills based on development and application of mindfulness.    Skills drawn from compassion-focused therapy, dialectical behavior therapy, mindfulness-based stress reduction, mindfulness-based cognitive therapy, etc.      We have developed these goals together during our work to this point. Patient has assisted in the development of these goals and has agreed to this treatment plan.       Edmundo Hansen LP  August 31, 2021

## 2021-10-04 ENCOUNTER — VIRTUAL VISIT (OUTPATIENT)
Dept: BEHAVIORAL HEALTH | Facility: CLINIC | Age: 41
End: 2021-10-04
Payer: COMMERCIAL

## 2021-10-04 DIAGNOSIS — F33.41 MAJOR DEPRESSIVE DISORDER, RECURRENT EPISODE, IN PARTIAL REMISSION (H): Primary | ICD-10-CM

## 2021-10-04 PROCEDURE — 90834 PSYTX W PT 45 MINUTES: CPT | Mod: 95 | Performed by: PSYCHOLOGIST

## 2021-10-04 NOTE — PROGRESS NOTES
"[facility]: Integrated Behavioral Health   October 4, 2021        Behavioral Health Clinician Progress Note    Patient Name: Shadia Mcgrath           Service Type: Phone Visit      Service Location:  Phone Visit      Session Start Time: 9:00  Session End Time: 9:48      Session Length: 38 - 52      Attendees: Patient     Visit Activities (Refresh list every visit): Phone Encounter    Shadia Mcgrath is a 40 year old female who is being evaluated via a telephone visit.      The patient has been notified of the following:     \"We have found that certain health care needs can be provided without the need for a face to face visit.  This service lets us provide the care you need with a short phone conversation.      I will have full access to your [place] medical record during this entire phone call.   I will be taking notes for your medical record.     Since this is like an office visit, we will bill your insurance company for this service.  Please check with your medical insurance if this type of telephone visit/virtual care is covered.  You may be responsible for the cost of this service if insurance coverage is denied.      There are potential benefits and risks of telephone visits (e.g. limits to patient confidentiality) that differ from in-person visits.?  Confidentiality still applies for telephone services, and nobody will record the visit.  It is important to be in a quiet, private space that is free of distractions (including cell phone or other devices) during the visit.??     If during the course of the call I believe a telephone visit is not appropriate, you will not be charged for this service\"    Consent has been obtained for this service by care team member: yes.    Diagnostic Assessment Date: 10/5/2020  Treatment Plan Review Date: 1/4/2022  See Flowsheets for today's PHQ-9 and ZACH-7 results  Previous PHQ-9:   PHQ-9 SCORE 8/27/2020   PHQ-9 Total Score 13 "     Previous ZACH-7: No flowsheet data found.       DATA  Extended Session (60+ minutes): No  Interactive Complexity: No  Crisis: No    Treatment Objective(s) Addressed in This Session:  Target Behavior(s): disease management/lifestyle changes related to psychosocial and relational stress    Depressed Mood: Increase interest, engagement, and pleasure in doing things  Decrease frequency and intensity of feeling down, depressed, hopeless   Review care progress    Current Stressors / Issues:  [facility] met with Shadia today over the phone to continue supporting her treatment of depression and reported difficulties with anger and relationship problems. Shadia states she continues to feel as though she is doing well overall, she denies significant concerns with depression. She reports mood is stable. We addressed/reviewed the following topics today:    Shadia requested a letter documenting her mental health would improve following a skin removal procedure. She indicates her depression is adversely affected by excess skin and would like a letter stating as such. This writer declined her request - we discussed rationale for this, including this not being the focus of her treatment and best practices from organizational policies.     Reports she started dating someone, this is going well overall. She reports he is well-adjusted and a good fit for her. He is reportedly the brother of a friend of hers, has known him since childhood. Her new partner does reportedly have a history of heavy alcohol misuse and we spoke to the risks with this for her. Reviewed her relationship history, how pervious relationships included patterns of dating partners with substance use issues. Discussed behaviors she would like to be cautious of in her new relationship. Discussed psycho-education about substance use, such as matters pertaining to tolerance, for her information moving forward.    Started going back to [employer]; this is helpful for finding  support. Reports it helps put things in perspective for her and cope with anger/relationship difficulties.     Reviewed next steps for her with counseling. We agreed to set up another appointment in 1 month to monitor progress and check-in. Updated care plan at end of visit.         Progress on Treatment Objective(s) / Homework:  Satisfactory progress - ACTION (Actively working towards change); Intervened by reinforcing change plan / affirming steps taken    Care Plan review completed: Yes     Interpersonal Psychotherapy    Explore patterns in relationships that are effective or ineffective at helping patient reach their goals, find satisfying experience.    Discuss new patterns or behaviors to engage in for improved social functioning.    Medication Review:  No changes to current psychiatric medication(s)    Medication Compliance:  Yes    Changes in Health Issues:   None reported    Chemical Use Review:   Substance Use: Chemical use reviewed, no active concerns identified      Tobacco Use: No current tobacco use.      Assessment: Current Emotional / Mental Status (status of significant symptoms):  Risk status (Self / Other harm or suicidal ideation)  Patient has had a history of suicidal ideation: SI as a youth only and suicide attempts: one prior attempt at age 15; none recent  Patient denies current fears or concerns for personal safety.     Patient denies current or recent suicidal ideation or behaviors.  Patient denies current or recent homicidal ideation or behaviors.  Patient denies current or recent self injurious behavior or ideation.  Patient denies other safety concerns.     A safety and risk management plan has not been developed at this time, however patient was encouraged to call [place] [phone] should there be a change in any of these risk factors.    [place] Suicide Severity Rating Scale (Lifetime/Recent)  [place] Suicide Severity Rating (Lifetime/Recent) 9/21/2020   1. Wish to be Dead  (Lifetime) Yes   1. Wish to be Dead (Recent) No   2. Non-Specific Active Suicidal Thoughts (Lifetime) Yes   2. Non-Specific Active Suicidal Thoughts (Recent) No   3. Active Suicidal Ideation with any Methods (Not Plan) Without Intent to Act (Lifetime) Yes   3. Active Suicidal Ideation with any Methods (Not Plan) Without Intent to Act (Recent) No   4. Active Suicidal Ideation with Some Intent to Act, Without Specific Plan (Lifetime) Yes   4. Active Suicidal Ideation with Some Intent to Act, Without Specific Plan (Recent) No   5. Active Suicidal Ideation with Specific Plan and Intent (Lifetime) Yes   5. Active Suicidal Ideation with Specific Plan and Intent (Recent) No   Most Severe Ideation Rating (Lifetime) 4   Frequency (Lifetime) 4   Duration (Lifetime) 4   Controllability (Lifetime) 3   Protective Factors  (Lifetime) 2   Reasons for Ideation (Lifetime) 4   Most Severe Ideation Rating (Past Month) NA   Frequency (Past Month) NA   Duration (Past Month) NA   Controllability (Past Month) NA   Protective Factors (Past Month) NA   Reasons for Ideation (Past Month) NA   Actual Attempt (Lifetime) Yes   Actual Attempt (Past 3 Months) No   Has subject engaged in non-suicidal self-injurious behavior? (Lifetime) No   Has subject engaged in non-suicidal self-injurious behavior? (Past 3 Months) No   Interrupted Attempts (Lifetime) No   Interrupted Attempts (Past 3 Months) No   Aborted or Self-Interrupted Attempt (Lifetime) No   Aborted or Self-Interrupted Attempt (Past 3 Months) No   Preparatory Acts or Behavior (Lifetime) No   Preparatory Acts or Behavior (Past 3 Months) No   Most Recent Attempt Actual Lethality Code 2   Most Lethal Attempt Actual Lethality Code 2   Initial/First Attempt Actual Lethality Code 2       Appearance:   Unable to assess   Eye Contact:   Unable to assess   Psychomotor Behavior: Unable to assess   Attitude:   Cooperative  Friendly Pleasant  Orientation:   All  Speech   Rate / Production: Normal     Volume:  Normal   Mood:    Angry  Depressed   Affect:    Appropriate   Thought Content:  Clear   Thought Form:  Coherent  Logical   Insight:    Good     Diagnoses:  1. Major depressive disorder, recurrent episode, in partial remission (H)        Collateral Reports Completed:   Not Applicable    Plan: (Homework, other):  Shadia was given information about behavioral services and encouraged to schedule a follow up appointment with the clinic [facility] in 1 month. She was encouraged to schedule sooner if needed. CD Recommendations: No indications of CD issues.     Edmundo Hansen Psy.D, LP   Behavioral Health Clinician   [facility]     October 4, 2021      ______________________________________________________________________    [facility] Integrated Behavioral Health Treatment Plan    Client's Name: Shadia Mcgrath  YOB: 1980    Date: 8/31/2021    DSM-V Diagnoses: 296.31 (F33.0) Major Depressive Disorder, Recurrent Episode, Mild _  WHODAS: none on file  Clinical Global Impressions  First:  Considering your total clinical experience with this particular patient population, how severe are the patient's symptoms at this time?: 2 (10/4/2021  9:51 AM)      Most recent:  Compared to the patient's condition at the START of treatment, this patient's condition is: 2 (10/4/2021  9:51 AM)      Referral / Collaboration:  Will collaborate with care team as indicated during treatment.    Anticipated number of session or this episode of care: 10+      MeasurableTreatment Goal(s) related to diagnosis / functional impairment(s)  Goal 1:  Patient will experience a reduction in depressive and anxious symptoms, along with a corresponding increase in positive emotion and life satisfaction.    Objective #A: Patient will experience a reduction in depressed mood, will develop more effective coping skills to manage depressive symptoms, will develop healthy cognitive patterns and beliefs, will  increase ability to function adaptively and will continue to take medications as prescribed / participate in supportive activities and services    Status: Continued - Date(s):COMPLETED    Objective #B: Patient will experience a reduction in anxiety, will develop more effective coping skills to manage anxiety symptoms, will develop healthy cognitive patterns and beliefs and will increase ability to function adaptively  Status: Continued - Date(s): COMPLETED    Objective #C: Patient will develop better understanding of triggers and coping strategies to stabilize mood  Status: Continued - Date(s): COMPLETED    Goal 2:  Patient will identify and increase engagement in valued activity, i.e. improving social connections/relationships, pursuing occupational goals or personally meaningful pursuits, exploration of meaning in life.     Objective #A: Patient will identify meaningful activity in social, occupational and  personal goals, and increase behavioral activation around these goals   Status: Continued - Date(s): COMPLETED    Objective #B: Patient will address relationship difficulties in a more adaptive manner  Status: Continued - Date(s): COMPLETED    Objective #C: Patient will develop coping/problem-solving skills to facilitate more adaptive adjustment and will effectively address problems that interfere with adaptive functioning  Status: Continued - Date(s):  October 4, 2021        Possible Therapeutic Intervention(s)  Psycho-education regarding mental health diagnoses and treatment options      Skills training    Explore skills useful to client in current situation.    Skills include assertiveness, communication, conflict management, problem-solving, relaxation, etc.    Solution-Focused Therapy    Explore patterns in patient's relationships and discuss options for new behaviors.    Explore patterns in patient's actions and choices and discuss options for new behaviors.    Cognitive-behavioral Therapy    Discuss  common cognitive distortions, identify them in patient's life.    Explore ways to challenge, replace, and act against these cognitions.    Acceptance and Commitment Therapy    Explore and identify important values in patient's life.    Discuss ways to commit to behavioral activation around these values.    Psychodynamic psychotherapy    Discuss patient's emotional dynamics and issues and how they impact behaviors.    Explore patient's history of relationships and how they impact present behaviors.    Explore how to work with and make changes in these schemas and patterns.    Narrative Therapy    Explore the patient's story of his/her life from his/her perspective.    Explore alternate ways of understanding their experience, identifying exceptions, developing new themes.    Interpersonal Psychotherapy    Explore patterns in relationships that are effective or ineffective at helping patient reach their goals, find satisfying experience.    Discuss new patterns or behaviors to engage in for improved social functioning.    Behavioral Activation    Discuss steps patient can take to become more involved in meaningful activity.    Identify barriers to these activities and explore possible solutions.    Mindfulness-Based Strategies    Discuss skills based on development and application of mindfulness.    Skills drawn from compassion-focused therapy, dialectical behavior therapy, mindfulness-based stress reduction, mindfulness-based cognitive therapy, etc.      We have developed these goals together during our work to this point. Patient has assisted in the development of these goals and has agreed to this treatment plan.       Edmundo Hansen, MARJAN  October 4, 2021

## 2021-10-10 ENCOUNTER — HEALTH MAINTENANCE LETTER (OUTPATIENT)
Age: 41
End: 2021-10-10

## 2021-10-14 ENCOUNTER — TELEPHONE (OUTPATIENT)
Dept: PULMONOLOGY | Facility: CLINIC | Age: 41
End: 2021-10-14

## 2021-10-14 NOTE — TELEPHONE ENCOUNTER
Spoke with pt about scheduling a 6 month follow up with Dr. Guy in Jan after being seen in July. Appt scheduled and details confirmed with pt.

## 2021-10-14 NOTE — TELEPHONE ENCOUNTER
PT contacted clinic to request letter from Dr. Guy stating need for abdominal skin removal for the purposes of improving respiratory health.  She states this was discussed at last OV.  Her surgery is scheduled for 10/28, she is hoping a letter from her MD will help with her insurance claim.  She would like letter mailed to her home address. Confirmed address on file.

## 2021-11-08 ENCOUNTER — VIRTUAL VISIT (OUTPATIENT)
Dept: BEHAVIORAL HEALTH | Facility: CLINIC | Age: 41
End: 2021-11-08
Payer: COMMERCIAL

## 2021-11-08 DIAGNOSIS — F33.0 MAJOR DEPRESSIVE DISORDER, RECURRENT EPISODE, MILD (H): Primary | ICD-10-CM

## 2021-11-08 PROCEDURE — 90834 PSYTX W PT 45 MINUTES: CPT | Mod: 95 | Performed by: PSYCHOLOGIST

## 2021-11-08 NOTE — PROGRESS NOTES
"[facility]: Integrated Behavioral Health   November 8, 2021        Behavioral Health Clinician Progress Note    Patient Name: Shadia Mcgrath           Service Type: Phone Visit      Service Location:  Phone Visit      Session Start Time: 9:00  Session End Time: 9:51      Session Length: 38 - 52      Attendees: Patient     Visit Activities (Refresh list every visit): Phone Encounter    Shadia Mcgrath is a 40 year old female who is being evaluated via a telephone visit.      The patient has been notified of the following:     \"We have found that certain health care needs can be provided without the need for a face to face visit.  This service lets us provide the care you need with a short phone conversation.      I will have full access to your [place] medical record during this entire phone call.   I will be taking notes for your medical record.     Since this is like an office visit, we will bill your insurance company for this service.  Please check with your medical insurance if this type of telephone visit/virtual care is covered.  You may be responsible for the cost of this service if insurance coverage is denied.      There are potential benefits and risks of telephone visits (e.g. limits to patient confidentiality) that differ from in-person visits.?  Confidentiality still applies for telephone services, and nobody will record the visit.  It is important to be in a quiet, private space that is free of distractions (including cell phone or other devices) during the visit.??     If during the course of the call I believe a telephone visit is not appropriate, you will not be charged for this service\"    Consent has been obtained for this service by care team member: yes.    Diagnostic Assessment Date: 10/5/2020  Treatment Plan Review Date: 1/4/2022  See Flowsheets for today's PHQ-9 and ZACH-7 results  Previous PHQ-9:   PHQ-9 SCORE 8/27/2020   PHQ-9 Total Score 13 " "    Previous ZACH-7: No flowsheet data found.       DATA  Extended Session (60+ minutes): No  Interactive Complexity: No  Crisis: No    Treatment Objective(s) Addressed in This Session:  Target Behavior(s): disease management/lifestyle changes related to psychosocial and relational stress    Depressed Mood: Increase interest, engagement, and pleasure in doing things  Decrease frequency and intensity of feeling down, depressed, hopeless   Review care progress    Current Stressors / Issues:  [facility] met with Shadia today over the phone to continue supporting her treatment of depression and reported difficulties with anger and relationship problems. Shadia states she continues to feel as though she is doing well overall, she denies significant concerns with depression. She reports mood is stable. We addressed/reviewed the following topics today:    She reports experiencing a variety of medical symptoms she cannot readily explain, though suspects they onset following her trying to dye her hair. She plans to follow up with her PCP today for further evaluation.     She spoke to how her symptoms, including menopause, affects her mood. She is reporting that she can be quick to irritability and \"snap\" at others. Explored ways she could manage irritability more effectively. Explored triggers to her irritability and how she responds, what this looks like for her. Discussed how she utilizes sarcasm and how this affects outcomes in conflicts. Processed themes of control/helplessness that seem to occur for her right before she gets angry. Explored adaptive responses to anger, such as breathing and mindfulness ideas to help with this. Discussed \"playing the tape\" with anger, referring to having her imagine outcomes if she acts on her anger. Explored behaviors that could lead to more empowering outcomes, as we discussed.       Progress on Treatment Objective(s) / Homework:  Satisfactory progress - ACTION (Actively working towards " change); Intervened by reinforcing change plan / affirming steps taken    Care Plan review completed: No     Interpersonal Psychotherapy    Explore patterns in relationships that are effective or ineffective at helping patient reach their goals, find satisfying experience.    Discuss new patterns or behaviors to engage in for improved social functioning.    Skills training    Explore skills useful to client in current situation.    Skills include assertiveness, communication, conflict management, problem-solving, relaxation, etc.    Solution-Focused Therapy    Explore patterns in patient's relationships and discuss options for new behaviors.    Explore patterns in patient's actions and choices and discuss options for new behaviors.    Medication Review:  No changes to current psychiatric medication(s)    Medication Compliance:  Yes    Changes in Health Issues:   None reported    Chemical Use Review:   Substance Use: Chemical use reviewed, no active concerns identified      Tobacco Use: No current tobacco use.      Assessment: Current Emotional / Mental Status (status of significant symptoms):  Risk status (Self / Other harm or suicidal ideation)  Patient has had a history of suicidal ideation: SI as a youth only and suicide attempts: one prior attempt at age 15; none recent  Patient denies current fears or concerns for personal safety.     Patient denies current or recent suicidal ideation or behaviors.  Patient denies current or recent homicidal ideation or behaviors.  Patient denies current or recent self injurious behavior or ideation.  Patient denies other safety concerns.     A safety and risk management plan has not been developed at this time, however patient was encouraged to call [place] [phone] should there be a change in any of these risk factors.    [place] Suicide Severity Rating Scale (Lifetime/Recent)  [place] Suicide Severity Rating (Lifetime/Recent) 9/21/2020   1. Wish to be Dead (Lifetime)  Yes   1. Wish to be Dead (Recent) No   2. Non-Specific Active Suicidal Thoughts (Lifetime) Yes   2. Non-Specific Active Suicidal Thoughts (Recent) No   3. Active Suicidal Ideation with any Methods (Not Plan) Without Intent to Act (Lifetime) Yes   3. Active Suicidal Ideation with any Methods (Not Plan) Without Intent to Act (Recent) No   4. Active Suicidal Ideation with Some Intent to Act, Without Specific Plan (Lifetime) Yes   4. Active Suicidal Ideation with Some Intent to Act, Without Specific Plan (Recent) No   5. Active Suicidal Ideation with Specific Plan and Intent (Lifetime) Yes   5. Active Suicidal Ideation with Specific Plan and Intent (Recent) No   Most Severe Ideation Rating (Lifetime) 4   Frequency (Lifetime) 4   Duration (Lifetime) 4   Controllability (Lifetime) 3   Protective Factors  (Lifetime) 2   Reasons for Ideation (Lifetime) 4   Most Severe Ideation Rating (Past Month) NA   Frequency (Past Month) NA   Duration (Past Month) NA   Controllability (Past Month) NA   Protective Factors (Past Month) NA   Reasons for Ideation (Past Month) NA   Actual Attempt (Lifetime) Yes   Actual Attempt (Past 3 Months) No   Has subject engaged in non-suicidal self-injurious behavior? (Lifetime) No   Has subject engaged in non-suicidal self-injurious behavior? (Past 3 Months) No   Interrupted Attempts (Lifetime) No   Interrupted Attempts (Past 3 Months) No   Aborted or Self-Interrupted Attempt (Lifetime) No   Aborted or Self-Interrupted Attempt (Past 3 Months) No   Preparatory Acts or Behavior (Lifetime) No   Preparatory Acts or Behavior (Past 3 Months) No   Most Recent Attempt Actual Lethality Code 2   Most Lethal Attempt Actual Lethality Code 2   Initial/First Attempt Actual Lethality Code 2       Appearance:   Unable to assess   Eye Contact:   Unable to assess   Psychomotor Behavior: Unable to assess   Attitude:   Cooperative  Friendly Pleasant  Orientation:   All  Speech   Rate / Production: Normal     Volume:  Normal   Mood:    Angry  Depressed   Affect:    Appropriate   Thought Content:  Clear   Thought Form:  Coherent  Logical   Insight:    Good     Diagnoses:  1. Major depressive disorder, recurrent episode, mild (H)        Collateral Reports Completed:   Not Applicable    Plan: (Homework, other):  Shadia was given information about behavioral services and encouraged to schedule a follow up appointment with the clinic [facility] in 1 month. She was given further adaptive strategies to manage irritability/anger difficulties in relationships. She was encouraged to schedule sooner if needed. CD Recommendations: No indications of CD issues.     Edmundo Hansen Psy.D,    Behavioral Health Clinician   [facility]     November 8, 2021        ______________________________________________________________________    [facility] Integrated Behavioral Health Treatment Plan    Client's Name: Shadia Mcgrath  YOB: 1980    Date: 8/31/2021    DSM-V Diagnoses: 296.31 (F33.0) Major Depressive Disorder, Recurrent Episode, Mild _  WHODAS: none on file  Clinical Global Impressions  First:  Considering your total clinical experience with this particular patient population, how severe are the patient's symptoms at this time?: 2 (10/4/2021  9:51 AM)      Most recent:  Compared to the patient's condition at the START of treatment, this patient's condition is: 2 (10/4/2021  9:51 AM)      Referral / Collaboration:  Will collaborate with care team as indicated during treatment.    Anticipated number of session or this episode of care: 10+      MeasurableTreatment Goal(s) related to diagnosis / functional impairment(s)  Goal 1:  Patient will experience a reduction in depressive and anxious symptoms, along with a corresponding increase in positive emotion and life satisfaction.    Objective #A: Patient will experience a reduction in depressed mood, will develop more effective coping skills to  manage depressive symptoms, will develop healthy cognitive patterns and beliefs, will increase ability to function adaptively and will continue to take medications as prescribed / participate in supportive activities and services    Status: Continued - Date(s):COMPLETED    Objective #B: Patient will experience a reduction in anxiety, will develop more effective coping skills to manage anxiety symptoms, will develop healthy cognitive patterns and beliefs and will increase ability to function adaptively  Status: Continued - Date(s): COMPLETED    Objective #C: Patient will develop better understanding of triggers and coping strategies to stabilize mood  Status: Continued - Date(s): COMPLETED    Goal 2:  Patient will identify and increase engagement in valued activity, i.e. improving social connections/relationships, pursuing occupational goals or personally meaningful pursuits, exploration of meaning in life.     Objective #A: Patient will identify meaningful activity in social, occupational and  personal goals, and increase behavioral activation around these goals   Status: Continued - Date(s): COMPLETED    Objective #B: Patient will address relationship difficulties in a more adaptive manner  Status: Continued - Date(s): COMPLETED    Objective #C: Patient will develop coping/problem-solving skills to facilitate more adaptive adjustment and will effectively address problems that interfere with adaptive functioning  Status: Continued - Date(s):  October 4, 2021        Possible Therapeutic Intervention(s)  Psycho-education regarding mental health diagnoses and treatment options      Skills training    Explore skills useful to client in current situation.    Skills include assertiveness, communication, conflict management, problem-solving, relaxation, etc.    Solution-Focused Therapy    Explore patterns in patient's relationships and discuss options for new behaviors.    Explore patterns in patient's actions and choices  and discuss options for new behaviors.    Cognitive-behavioral Therapy    Discuss common cognitive distortions, identify them in patient's life.    Explore ways to challenge, replace, and act against these cognitions.    Acceptance and Commitment Therapy    Explore and identify important values in patient's life.    Discuss ways to commit to behavioral activation around these values.    Psychodynamic psychotherapy    Discuss patient's emotional dynamics and issues and how they impact behaviors.    Explore patient's history of relationships and how they impact present behaviors.    Explore how to work with and make changes in these schemas and patterns.    Narrative Therapy    Explore the patient's story of his/her life from his/her perspective.    Explore alternate ways of understanding their experience, identifying exceptions, developing new themes.    Interpersonal Psychotherapy    Explore patterns in relationships that are effective or ineffective at helping patient reach their goals, find satisfying experience.    Discuss new patterns or behaviors to engage in for improved social functioning.    Behavioral Activation    Discuss steps patient can take to become more involved in meaningful activity.    Identify barriers to these activities and explore possible solutions.    Mindfulness-Based Strategies    Discuss skills based on development and application of mindfulness.    Skills drawn from compassion-focused therapy, dialectical behavior therapy, mindfulness-based stress reduction, mindfulness-based cognitive therapy, etc.      We have developed these goals together during our work to this point. Patient has assisted in the development of these goals and has agreed to this treatment plan.       Edmundo Hansen, MARJAN  October 4, 2021

## 2021-11-12 ENCOUNTER — TELEPHONE (OUTPATIENT)
Dept: PULMONOLOGY | Facility: CLINIC | Age: 41
End: 2021-11-12
Payer: COMMERCIAL

## 2021-11-12 NOTE — TELEPHONE ENCOUNTER
Spoke with patient regarding rescheduling a follow up appointment with Dr. mcgowan. Appointment was rescheduled from 1/17 to 2/14 and details confirmed with patient who voiced understanding.

## 2021-12-06 ENCOUNTER — VIRTUAL VISIT (OUTPATIENT)
Dept: BEHAVIORAL HEALTH | Facility: CLINIC | Age: 41
End: 2021-12-06
Payer: COMMERCIAL

## 2021-12-06 DIAGNOSIS — F33.0 MAJOR DEPRESSIVE DISORDER, RECURRENT EPISODE, MILD (H): Primary | ICD-10-CM

## 2021-12-06 PROCEDURE — 90834 PSYTX W PT 45 MINUTES: CPT | Mod: 95 | Performed by: PSYCHOLOGIST

## 2021-12-06 NOTE — PROGRESS NOTES
"[facility]: Integrated Behavioral Health   December 6, 2021        Behavioral Health Clinician Progress Note    Patient Name: Shadia Mcgrath           Service Type: Phone Visit      Service Location:  Phone Visit      Session Start Time: 9:04  Session End Time: 9:51      Session Length: 38 - 52      Attendees: Patient     Visit Activities (Refresh list every visit): Phone Encounter    Shadia Mcgrath is a 40 year old female who is being evaluated via a telephone visit.      The patient has been notified of the following:     \"We have found that certain health care needs can be provided without the need for a face to face visit.  This service lets us provide the care you need with a short phone conversation.      I will have full access to your [facility] medical record during this entire phone call.   I will be taking notes for your medical record.     Since this is like an office visit, we will bill your insurance company for this service.  Please check with your medical insurance if this type of telephone visit/virtual care is covered.  You may be responsible for the cost of this service if insurance coverage is denied.      There are potential benefits and risks of telephone visits (e.g. limits to patient confidentiality) that differ from in-person visits.?  Confidentiality still applies for telephone services, and nobody will record the visit.  It is important to be in a quiet, private space that is free of distractions (including cell phone or other devices) during the visit.??     If during the course of the call I believe a telephone visit is not appropriate, you will not be charged for this service\"    Consent has been obtained for this service by care team member: yes.    Diagnostic Assessment Date: 10/5/2020  Treatment Plan Review Date: 1/4/2022  See Flowsheets for today's PHQ-9 and ZACH-7 results  Previous PHQ-9:   PHQ-9 SCORE 8/27/2020   PHQ-9 Total Score 13 "     Previous ZACH-7: No flowsheet data found.       DATA  Extended Session (60+ minutes): No  Interactive Complexity: No  Crisis: No    Treatment Objective(s) Addressed in This Session:  Target Behavior(s): disease management/lifestyle changes related to psychosocial and relational stress    Depressed Mood: Increase interest, engagement, and pleasure in doing things  Decrease frequency and intensity of feeling down, depressed, hopeless  Anger Management: will learn strategies to resolve conflict adaptively and will learn and practice positive anger management skills        Current Stressors / Issues:  [facility] met with Shadia today over the phone to continue supporting her treatment of depression and reported difficulties with anger and relationship problems. We discussed the following topics today:    Shadia reports trying to reach out to this provider last week, but she was unable to establish contact due to changes in our scheduling system. She indicates she wanted to reach out due to feeling very upset and down about matters with work. She describes today that she got upset upon learning her bonus pay was about 50 dollars less than her other colleagues and she notes feeling disrespected and taken for granted because of this. Shadia notes she tries to go above and beyond at work and notes she did not feel adequately compensated for the work she does. At the same time, Shadia noted she recently lost a close friend due to medical causes and is experiencing a sense of loss/grief from this right now. She spoke to how her grief made the situation at work with her pay even more difficult to manage. She reported experiencing a high amount of anger and hurt feelings, noting while she did well in not taking her anger out on others, she found herself struggling with feeling angry.     Discussed the progress she has made with managing her anger more effectively. Affirmed the adaptive steps she has taken to managing her anger  outbursts better, like thinking about the possible consequences of acting out. Continued to help Shadia process her recent losses and how this affects her behavior at work. Reviewed additional strategies she could implement to more effectively manage her anger difficulties and resolve conflict more adaptively. Discussed focusing on things she can control, like her reactions/language when faced with conflict, instead of trying to control other people.        Progress on Treatment Objective(s) / Homework:  Worsening - PREPARATION (Decided to change - considering how); Intervened by negotiating a change plan and determining options / strategies for behavior change, identifying triggers, exploring social supports, and working towards setting a date to begin behavior change    Care Plan review completed: Yes     Interpersonal Psychotherapy    Explore patterns in relationships that are effective or ineffective at helping patient reach their goals, find satisfying experience.    Discuss new patterns or behaviors to engage in for improved social functioning.    Skills training    Explore skills useful to client in current situation.    Skills include assertiveness, communication, conflict management, problem-solving, relaxation, etc.    Solution-Focused Therapy    Explore patterns in patient's relationships and discuss options for new behaviors.    Explore patterns in patient's actions and choices and discuss options for new behaviors.    Medication Review:  No changes to current psychiatric medication(s)    Medication Compliance:  Yes    Changes in Health Issues:   None reported    Chemical Use Review:   Substance Use: Chemical use reviewed, no active concerns identified      Tobacco Use: No current tobacco use.      Assessment: Current Emotional / Mental Status (status of significant symptoms):  Risk status (Self / Other harm or suicidal ideation)  Patient has had a history of suicidal ideation: SI as a youth only and  suicide attempts: one prior attempt at age 15; none recent  Patient denies current fears or concerns for personal safety.     Patient denies current or recent suicidal ideation or behaviors.  Patient denies current or recent homicidal ideation or behaviors.  Patient denies current or recent self injurious behavior or ideation.  Patient denies other safety concerns.     A safety and risk management plan has not been developed at this time, however patient was encouraged to call [place] [phone] should there be a change in any of these risk factors.    [place] Suicide Severity Rating Scale (Lifetime/Recent)  [place] Suicide Severity Rating (Lifetime/Recent) 9/21/2020   1. Wish to be Dead (Lifetime) Yes   1. Wish to be Dead (Recent) No   2. Non-Specific Active Suicidal Thoughts (Lifetime) Yes   2. Non-Specific Active Suicidal Thoughts (Recent) No   3. Active Suicidal Ideation with any Methods (Not Plan) Without Intent to Act (Lifetime) Yes   3. Active Suicidal Ideation with any Methods (Not Plan) Without Intent to Act (Recent) No   4. Active Suicidal Ideation with Some Intent to Act, Without Specific Plan (Lifetime) Yes   4. Active Suicidal Ideation with Some Intent to Act, Without Specific Plan (Recent) No   5. Active Suicidal Ideation with Specific Plan and Intent (Lifetime) Yes   5. Active Suicidal Ideation with Specific Plan and Intent (Recent) No   Most Severe Ideation Rating (Lifetime) 4   Frequency (Lifetime) 4   Duration (Lifetime) 4   Controllability (Lifetime) 3   Protective Factors  (Lifetime) 2   Reasons for Ideation (Lifetime) 4   Most Severe Ideation Rating (Past Month) NA   Frequency (Past Month) NA   Duration (Past Month) NA   Controllability (Past Month) NA   Protective Factors (Past Month) NA   Reasons for Ideation (Past Month) NA   Actual Attempt (Lifetime) Yes   Actual Attempt (Past 3 Months) No   Has subject engaged in non-suicidal self-injurious behavior? (Lifetime) No   Has subject engaged  in non-suicidal self-injurious behavior? (Past 3 Months) No   Interrupted Attempts (Lifetime) No   Interrupted Attempts (Past 3 Months) No   Aborted or Self-Interrupted Attempt (Lifetime) No   Aborted or Self-Interrupted Attempt (Past 3 Months) No   Preparatory Acts or Behavior (Lifetime) No   Preparatory Acts or Behavior (Past 3 Months) No   Most Recent Attempt Actual Lethality Code 2   Most Lethal Attempt Actual Lethality Code 2   Initial/First Attempt Actual Lethality Code 2       Appearance:   Unable to assess   Eye Contact:   Unable to assess   Psychomotor Behavior: Unable to assess   Attitude:   Cooperative  Friendly Pleasant  Orientation:   All  Speech   Rate / Production: Normal    Volume:  Normal   Mood:    Angry  Depressed   Affect:    Appropriate   Thought Content:  Clear   Thought Form:  Coherent  Logical   Insight:    Good     Diagnoses:  1. Major depressive disorder, recurrent episode, mild (H)        Collateral Reports Completed:   Not Applicable    Plan: (Homework, other):  Shadia was given information about behavioral services and encouraged to schedule a follow up appointment with the clinic [facility] in 1 month. She was given further adaptive strategies to manage irritability/anger difficulties in relationships. She was encouraged to schedule sooner if needed. CD Recommendations: No indications of CD issues.     Edmundo Hansen Psy.D,    Behavioral Health Clinician   [facility]     12/6/2021        ______________________________________________________________________    [facility] Integrated Behavioral Health Treatment Plan    Client's Name: Shadia Mcgrath  YOB: 1980    Date: 8/31/2021    DSM-V Diagnoses: 296.31 (F33.0) Major Depressive Disorder, Recurrent Episode, Mild _  WHODAS: none on file  Clinical Global Impressions  First:  Considering your total clinical experience with this particular patient population, how severe are the patient's symptoms at  this time?: 2 (10/4/2021  9:51 AM)      Most recent:  Compared to the patient's condition at the START of treatment, this patient's condition is: 2 (10/4/2021  9:51 AM)      Referral / Collaboration:  Will collaborate with care team as indicated during treatment.    Anticipated number of session or this episode of care: 10+      MeasurableTreatment Goal(s) related to diagnosis / functional impairment(s)  Goal 1:  Patient will experience a reduction in depressive and anxious symptoms, along with a corresponding increase in positive emotion and life satisfaction.    Objective #A: Patient will experience a reduction in depressed mood, will develop more effective coping skills to manage depressive symptoms, will develop healthy cognitive patterns and beliefs, will increase ability to function adaptively and will continue to take medications as prescribed / participate in supportive activities and services    Status: Continued - Date(s):COMPLETED    Objective #B: Patient will experience a reduction in anxiety, will develop more effective coping skills to manage anxiety symptoms, will develop healthy cognitive patterns and beliefs and will increase ability to function adaptively  Status: Continued - Date(s): COMPLETED    Objective #C: Patient will develop better understanding of triggers and coping strategies to stabilize mood  Status: Continued - Date(s): COMPLETED    Goal 2:  Patient will identify and increase engagement in valued activity, i.e. improving social connections/relationships, pursuing occupational goals or personally meaningful pursuits, exploration of meaning in life.     Objective #A: Patient will identify meaningful activity in social, occupational and  personal goals, and increase behavioral activation around these goals   Status: Continued - Date(s): COMPLETED    Objective #B: Patient will address relationship difficulties in a more adaptive manner  Status: Continued - Date(s): COMPLETED    Objective  #C: Patient will develop coping/problem-solving skills to facilitate more adaptive adjustment and will effectively address problems that interfere with adaptive functioning  Status: Continued - Date(s):  October 4, 2021        Possible Therapeutic Intervention(s)  Psycho-education regarding mental health diagnoses and treatment options      Skills training    Explore skills useful to client in current situation.    Skills include assertiveness, communication, conflict management, problem-solving, relaxation, etc.    Solution-Focused Therapy    Explore patterns in patient's relationships and discuss options for new behaviors.    Explore patterns in patient's actions and choices and discuss options for new behaviors.    Cognitive-behavioral Therapy    Discuss common cognitive distortions, identify them in patient's life.    Explore ways to challenge, replace, and act against these cognitions.    Acceptance and Commitment Therapy    Explore and identify important values in patient's life.    Discuss ways to commit to behavioral activation around these values.    Psychodynamic psychotherapy    Discuss patient's emotional dynamics and issues and how they impact behaviors.    Explore patient's history of relationships and how they impact present behaviors.    Explore how to work with and make changes in these schemas and patterns.    Narrative Therapy    Explore the patient's story of his/her life from his/her perspective.    Explore alternate ways of understanding their experience, identifying exceptions, developing new themes.    Interpersonal Psychotherapy    Explore patterns in relationships that are effective or ineffective at helping patient reach their goals, find satisfying experience.    Discuss new patterns or behaviors to engage in for improved social functioning.    Behavioral Activation    Discuss steps patient can take to become more involved in meaningful activity.    Identify barriers to these activities  and explore possible solutions.    Mindfulness-Based Strategies    Discuss skills based on development and application of mindfulness.    Skills drawn from compassion-focused therapy, dialectical behavior therapy, mindfulness-based stress reduction, mindfulness-based cognitive therapy, etc.      We have developed these goals together during our work to this point. Patient has assisted in the development of these goals and has agreed to this treatment plan.       Edmundo Hansen, MARJAN  October 4, 2021

## 2021-12-23 ENCOUNTER — VIRTUAL VISIT (OUTPATIENT)
Dept: BEHAVIORAL HEALTH | Facility: CLINIC | Age: 41
End: 2021-12-23
Payer: COMMERCIAL

## 2021-12-23 DIAGNOSIS — F33.0 MAJOR DEPRESSIVE DISORDER, RECURRENT EPISODE, MILD (H): Primary | ICD-10-CM

## 2021-12-23 PROCEDURE — 90834 PSYTX W PT 45 MINUTES: CPT | Mod: 95 | Performed by: PSYCHOLOGIST

## 2021-12-23 NOTE — PROGRESS NOTES
"[facility]: Integrated Behavioral Health   December 23, 2021        Behavioral Health Clinician Progress Note    Patient Name: Shadia Mcgrath           Service Type: Phone Visit      Service Location:  Phone Visit      Session Start Time: 9:04  Session End Time: 9:51      Session Length: 38 - 52      Attendees: Patient     Visit Activities (Refresh list every visit): Phone Encounter    Shadia Mcgrath is a 40 year old female who is being evaluated via a telephone visit.      The patient has been notified of the following:     \"We have found that certain health care needs can be provided without the need for a face to face visit.  This service lets us provide the care you need with a short phone conversation.      I will have full access to your [facility] medical record during this entire phone call.   I will be taking notes for your medical record.     Since this is like an office visit, we will bill your insurance company for this service.  Please check with your medical insurance if this type of telephone visit/virtual care is covered.  You may be responsible for the cost of this service if insurance coverage is denied.      There are potential benefits and risks of telephone visits (e.g. limits to patient confidentiality) that differ from in-person visits.?  Confidentiality still applies for telephone services, and nobody will record the visit.  It is important to be in a quiet, private space that is free of distractions (including cell phone or other devices) during the visit.??     If during the course of the call I believe a telephone visit is not appropriate, you will not be charged for this service\"    Consent has been obtained for this service by care team member: yes.    Diagnostic Assessment Date: 10/5/2020  Treatment Plan Review Date: 1/4/2022  See Flowsheets for today's PHQ-9 and ZACH-7 results  Previous PHQ-9:   PHQ-9 SCORE 8/27/2020   PHQ-9 Total Score 13 "     Previous ZACH-7: No flowsheet data found.       DATA  Extended Session (60+ minutes): No  Interactive Complexity: No  Crisis: No    Treatment Objective(s) Addressed in This Session:  Target Behavior(s): disease management/lifestyle changes related to psychosocial and relational stress    Depressed Mood: Increase interest, engagement, and pleasure in doing things  Decrease frequency and intensity of feeling down, depressed, hopeless  Anger Management: will learn strategies to resolve conflict adaptively and will learn and practice positive anger management skills        Current Stressors / Issues:  [facility] met with Shadia today over the phone to continue supporting her treatment of depression, reported difficulties with anger and relationship problems. We discussed the following topics today:    Shadia presents today with a more positive, confident mood and euthymic affect. She reports feeling positive about a few recent successes she has achieved with managing her anger effectively. She provided background about two recent events that involved her successfully managing conflict and stressful situations. We explored the specific skills she utilized to help with these instances and compared her current reactions to how she believes historically she would have managed them. Identified the skills with managing anger she considers most effective and best to keep in mind moving forward. She discussed the benefits of putting the situation/issue in perspective and considering how others might feel if she gets angry.     We also talked about the notion of blaming others for stressful situations, how this is automatic in many cses, but can lead to anger outbursts toward others. Explored ways to eliminate blaming others and identifying factors about the situation that are stressful instead. Discussed examples of this, how identifying the situation as stressful, but not assigning blame, helps manage anger difficulties. We  emphasized the idea of focusing on what she can control in difficult situations to this end. Shadia did well in identifying additionally how she cannot control others, only her own reactions to difficult events.       Progress on Treatment Objective(s) / Homework:  Satisfactory progress - ACTION (Actively working towards change); Intervened by reinforcing change plan / affirming steps taken    Care Plan review completed: No     Interpersonal Psychotherapy    Explore patterns in relationships that are effective or ineffective at helping patient reach their goals, find satisfying experience.    Discuss new patterns or behaviors to engage in for improved social functioning.    Skills training    Explore skills useful to client in current situation.    Skills include assertiveness, communication, conflict management, problem-solving, relaxation, etc.    Solution-Focused Therapy    Explore patterns in patient's relationships and discuss options for new behaviors.    Explore patterns in patient's actions and choices and discuss options for new behaviors.    Medication Review:  No changes to current psychiatric medication(s)    Medication Compliance:  Yes    Changes in Health Issues:   None reported    Chemical Use Review:   Substance Use: Chemical use reviewed, no active concerns identified      Tobacco Use: No current tobacco use.      Assessment: Current Emotional / Mental Status (status of significant symptoms):  Risk status (Self / Other harm or suicidal ideation)  Patient has had a history of suicidal ideation: SI as a youth only and suicide attempts: one prior attempt at age 15; none recent  Patient denies current fears or concerns for personal safety.     Patient denies current or recent suicidal ideation or behaviors.  Patient denies current or recent homicidal ideation or behaviors.  Patient denies current or recent self injurious behavior or ideation.  Patient denies other safety concerns.     A safety and risk  management plan has not been developed at this time, however patient was encouraged to call [place] [phone] should there be a change in any of these risk factors.    [place] Suicide Severity Rating Scale (Lifetime/Recent)  [place] Suicide Severity Rating (Lifetime/Recent) 9/21/2020   1. Wish to be Dead (Lifetime) Yes   1. Wish to be Dead (Recent) No   2. Non-Specific Active Suicidal Thoughts (Lifetime) Yes   2. Non-Specific Active Suicidal Thoughts (Recent) No   3. Active Suicidal Ideation with any Methods (Not Plan) Without Intent to Act (Lifetime) Yes   3. Active Suicidal Ideation with any Methods (Not Plan) Without Intent to Act (Recent) No   4. Active Suicidal Ideation with Some Intent to Act, Without Specific Plan (Lifetime) Yes   4. Active Suicidal Ideation with Some Intent to Act, Without Specific Plan (Recent) No   5. Active Suicidal Ideation with Specific Plan and Intent (Lifetime) Yes   5. Active Suicidal Ideation with Specific Plan and Intent (Recent) No   Most Severe Ideation Rating (Lifetime) 4   Frequency (Lifetime) 4   Duration (Lifetime) 4   Controllability (Lifetime) 3   Protective Factors  (Lifetime) 2   Reasons for Ideation (Lifetime) 4   Most Severe Ideation Rating (Past Month) NA   Frequency (Past Month) NA   Duration (Past Month) NA   Controllability (Past Month) NA   Protective Factors (Past Month) NA   Reasons for Ideation (Past Month) NA   Actual Attempt (Lifetime) Yes   Actual Attempt (Past 3 Months) No   Has subject engaged in non-suicidal self-injurious behavior? (Lifetime) No   Has subject engaged in non-suicidal self-injurious behavior? (Past 3 Months) No   Interrupted Attempts (Lifetime) No   Interrupted Attempts (Past 3 Months) No   Aborted or Self-Interrupted Attempt (Lifetime) No   Aborted or Self-Interrupted Attempt (Past 3 Months) No   Preparatory Acts or Behavior (Lifetime) No   Preparatory Acts or Behavior (Past 3 Months) No   Most Recent Attempt Actual Lethality Code 2    Most Lethal Attempt Actual Lethality Code 2   Initial/First Attempt Actual Lethality Code 2       Appearance:   Unable to assess   Eye Contact:   Unable to assess   Psychomotor Behavior: Unable to assess   Attitude:   Cooperative  Friendly Pleasant  Orientation:   All  Speech   Rate / Production: Normal    Volume:  Normal   Mood:    Angry  Depressed   Affect:    Appropriate   Thought Content:  Clear   Thought Form:  Coherent  Logical   Insight:    Good     Diagnoses:  1. Major depressive disorder, recurrent episode, mild (H)        Collateral Reports Completed:   Not Applicable    Plan: (Homework, other):  Shadia was given information about behavioral services and encouraged to schedule a follow up appointment with the clinic [facility] in 1 month. She was given further adaptive strategies to manage irritability/anger difficulties in relationships. She was encouraged to schedule sooner if needed. CD Recommendations: No indications of CD issues.     Edmundo Hansen Psy.D, LP   Behavioral Health Clinician   [facility]     December 23, 2021        ______________________________________________________________________    [facility] Integrated Behavioral Health Treatment Plan    Client's Name: Shadia Mcgrath  YOB: 1980    Date: 8/31/2021    DSM-V Diagnoses: 296.31 (F33.0) Major Depressive Disorder, Recurrent Episode, Mild _  WHODAS: none on file  Clinical Global Impressions  First:  Considering your total clinical experience with this particular patient population, how severe are the patient's symptoms at this time?: 2 (10/4/2021  9:51 AM)      Most recent:  Compared to the patient's condition at the START of treatment, this patient's condition is: 2 (10/4/2021  9:51 AM)      Referral / Collaboration:  Will collaborate with care team as indicated during treatment.    Anticipated number of session or this episode of care: 10+      MeasurableTreatment Goal(s) related to diagnosis /  functional impairment(s)  Goal 1:  Patient will experience a reduction in depressive and anxious symptoms, along with a corresponding increase in positive emotion and life satisfaction.    Objective #A: Patient will experience a reduction in depressed mood, will develop more effective coping skills to manage depressive symptoms, will develop healthy cognitive patterns and beliefs, will increase ability to function adaptively and will continue to take medications as prescribed / participate in supportive activities and services    Status: Continued - Date(s):COMPLETED    Objective #B: Patient will experience a reduction in anxiety, will develop more effective coping skills to manage anxiety symptoms, will develop healthy cognitive patterns and beliefs and will increase ability to function adaptively  Status: Continued - Date(s): COMPLETED    Objective #C: Patient will develop better understanding of triggers and coping strategies to stabilize mood  Status: Continued - Date(s): COMPLETED    Goal 2:  Patient will identify and increase engagement in valued activity, i.e. improving social connections/relationships, pursuing occupational goals or personally meaningful pursuits, exploration of meaning in life.     Objective #A: Patient will identify meaningful activity in social, occupational and  personal goals, and increase behavioral activation around these goals   Status: Continued - Date(s): COMPLETED    Objective #B: Patient will address relationship difficulties in a more adaptive manner  Status: Continued - Date(s): COMPLETED    Objective #C: Patient will develop coping/problem-solving skills to facilitate more adaptive adjustment and will effectively address problems that interfere with adaptive functioning  Status: Continued - Date(s):  October 4, 2021        Possible Therapeutic Intervention(s)  Psycho-education regarding mental health diagnoses and treatment options      Skills training    Explore skills useful  to client in current situation.    Skills include assertiveness, communication, conflict management, problem-solving, relaxation, etc.    Solution-Focused Therapy    Explore patterns in patient's relationships and discuss options for new behaviors.    Explore patterns in patient's actions and choices and discuss options for new behaviors.    Cognitive-behavioral Therapy    Discuss common cognitive distortions, identify them in patient's life.    Explore ways to challenge, replace, and act against these cognitions.    Acceptance and Commitment Therapy    Explore and identify important values in patient's life.    Discuss ways to commit to behavioral activation around these values.    Psychodynamic psychotherapy    Discuss patient's emotional dynamics and issues and how they impact behaviors.    Explore patient's history of relationships and how they impact present behaviors.    Explore how to work with and make changes in these schemas and patterns.    Narrative Therapy    Explore the patient's story of his/her life from his/her perspective.    Explore alternate ways of understanding their experience, identifying exceptions, developing new themes.    Interpersonal Psychotherapy    Explore patterns in relationships that are effective or ineffective at helping patient reach their goals, find satisfying experience.    Discuss new patterns or behaviors to engage in for improved social functioning.    Behavioral Activation    Discuss steps patient can take to become more involved in meaningful activity.    Identify barriers to these activities and explore possible solutions.    Mindfulness-Based Strategies    Discuss skills based on development and application of mindfulness.    Skills drawn from compassion-focused therapy, dialectical behavior therapy, mindfulness-based stress reduction, mindfulness-based cognitive therapy, etc.      We have developed these goals together during our work to this point. Patient has  assisted in the development of these goals and has agreed to this treatment plan.       Edmundo Hansen, LP  October 4, 2021

## 2022-01-14 ENCOUNTER — VIRTUAL VISIT (OUTPATIENT)
Dept: BEHAVIORAL HEALTH | Facility: CLINIC | Age: 42
End: 2022-01-14
Payer: COMMERCIAL

## 2022-01-14 DIAGNOSIS — F33.0 MAJOR DEPRESSIVE DISORDER, RECURRENT EPISODE, MILD (H): Primary | ICD-10-CM

## 2022-01-14 PROCEDURE — 90834 PSYTX W PT 45 MINUTES: CPT | Mod: 95 | Performed by: PSYCHOLOGIST

## 2022-01-14 NOTE — PROGRESS NOTES
"[facility]: Integrated Behavioral Health   January 14, 2022        Behavioral Health Clinician Progress Note    Patient Name: Shadia Mcgrath           Service Type: Phone Visit      Service Location:  Phone Visit      Session Start Time: 9:04  Session End Time: 9:44      Session Length: 38 - 52      Attendees: Patient     Visit Activities (Refresh list every visit): Phone Encounter    Shadia Mcgrath is a 40 year old female who is being evaluated via a telephone visit.      The patient has been notified of the following:     \"We have found that certain health care needs can be provided without the need for a face to face visit.  This service lets us provide the care you need with a short phone conversation.      I will have full access to your [place] medical record during this entire phone call.   I will be taking notes for your medical record.     Since this is like an office visit, we will bill your insurance company for this service.  Please check with your medical insurance if this type of telephone visit/virtual care is covered.  You may be responsible for the cost of this service if insurance coverage is denied.      There are potential benefits and risks of telephone visits (e.g. limits to patient confidentiality) that differ from in-person visits.?  Confidentiality still applies for telephone services, and nobody will record the visit.  It is important to be in a quiet, private space that is free of distractions (including cell phone or other devices) during the visit.??     If during the course of the call I believe a telephone visit is not appropriate, you will not be charged for this service\"    Consent has been obtained for this service by care team member: yes.    Diagnostic Assessment Date: 10/5/2020  Treatment Plan Review Date: 4/14/2022  See Flowsheets for today's PHQ-9 and ZACH-7 results  Previous PHQ-9:   PHQ-9 SCORE 8/27/2020   PHQ-9 Total Score 13 " "    Previous ZACH-7: No flowsheet data found.       DATA  Extended Session (60+ minutes): No  Interactive Complexity: No  Crisis: No    Treatment Objective(s) Addressed in This Session:  Target Behavior(s): disease management/lifestyle changes related to psychosocial and relational stress    Depressed Mood: Increase interest, engagement, and pleasure in doing things  Decrease frequency and intensity of feeling down, depressed, hopeless  Anger Management: will learn strategies to resolve conflict adaptively and will learn and practice positive anger management skills        Current Stressors / Issues:  [facility] met with Shadia today over the phone to continue supporting her treatment of depression, reported difficulties with anger and relationship problems. We discussed the following topics today:    Shadia presents today with a more positive, confident mood and euthymic affect. She reports feeling positive about how she managed a few recent interpersonal conflicts over the last several weeks. Describes one instance in particular, how she responded calmly to someone yelling at her. She reports that she was calm up until she said a few expletives toward the other person, which she later apologized for.     Focused on conversation today about exploring/identifying situational triggers for interpersonal conflicts. Explained that skills to manage anger are effective if combined with avoiding certain behaviors/situations that can lead to anger. Identified the role of gossiping about others as a recurrent variable for her finding herself in antagonistic situations with others. Explored how gossiping can be like gambling: tempting, but often leads to undesired outcomes. Explored ideas of avoiding gossiping or \"gambling\" with her relationships, so to speak. Discussed adaptive responses to these situations, such as declining to participating in gossip behavior or explaining this to others a goal she is making for herself. "       Progress on Treatment Objective(s) / Homework:  Satisfactory progress - ACTION (Actively working towards change); Intervened by reinforcing change plan / affirming steps taken    Care Plan review completed: No     Interpersonal Psychotherapy    Explore patterns in relationships that are effective or ineffective at helping patient reach their goals, find satisfying experience.    Discuss new patterns or behaviors to engage in for improved social functioning.    Skills training    Explore skills useful to client in current situation.    Skills include assertiveness, communication, conflict management, problem-solving, relaxation, etc.    Solution-Focused Therapy    Explore patterns in patient's relationships and discuss options for new behaviors.    Explore patterns in patient's actions and choices and discuss options for new behaviors.    Medication Review:  No changes to current psychiatric medication(s)    Medication Compliance:  Yes    Changes in Health Issues:   None reported    Chemical Use Review:   Substance Use: Chemical use reviewed, no active concerns identified      Tobacco Use: No current tobacco use.      Assessment: Current Emotional / Mental Status (status of significant symptoms):  Risk status (Self / Other harm or suicidal ideation)  Patient has had a history of suicidal ideation: SI as a youth only and suicide attempts: one prior attempt at age 15; none recent  Patient denies current fears or concerns for personal safety.     Patient denies current or recent suicidal ideation or behaviors.  Patient denies current or recent homicidal ideation or behaviors.  Patient denies current or recent self injurious behavior or ideation.  Patient denies other safety concerns.     A safety and risk management plan has not been developed at this time, however patient was encouraged to call [facility] / 91 should there be a change in any of these risk factors.    [place] Suicide Severity Rating Scale  (Lifetime/Recent)  [place] Suicide Severity Rating (Lifetime/Recent) 9/21/2020   1. Wish to be Dead (Lifetime) Yes   1. Wish to be Dead (Recent) No   2. Non-Specific Active Suicidal Thoughts (Lifetime) Yes   2. Non-Specific Active Suicidal Thoughts (Recent) No   3. Active Suicidal Ideation with any Methods (Not Plan) Without Intent to Act (Lifetime) Yes   3. Active Suicidal Ideation with any Methods (Not Plan) Without Intent to Act (Recent) No   4. Active Suicidal Ideation with Some Intent to Act, Without Specific Plan (Lifetime) Yes   4. Active Suicidal Ideation with Some Intent to Act, Without Specific Plan (Recent) No   5. Active Suicidal Ideation with Specific Plan and Intent (Lifetime) Yes   5. Active Suicidal Ideation with Specific Plan and Intent (Recent) No   Most Severe Ideation Rating (Lifetime) 4   Frequency (Lifetime) 4   Duration (Lifetime) 4   Controllability (Lifetime) 3   Protective Factors  (Lifetime) 2   Reasons for Ideation (Lifetime) 4   Most Severe Ideation Rating (Past Month) NA   Frequency (Past Month) NA   Duration (Past Month) NA   Controllability (Past Month) NA   Protective Factors (Past Month) NA   Reasons for Ideation (Past Month) NA   Actual Attempt (Lifetime) Yes   Actual Attempt (Past 3 Months) No   Has subject engaged in non-suicidal self-injurious behavior? (Lifetime) No   Has subject engaged in non-suicidal self-injurious behavior? (Past 3 Months) No   Interrupted Attempts (Lifetime) No   Interrupted Attempts (Past 3 Months) No   Aborted or Self-Interrupted Attempt (Lifetime) No   Aborted or Self-Interrupted Attempt (Past 3 Months) No   Preparatory Acts or Behavior (Lifetime) No   Preparatory Acts or Behavior (Past 3 Months) No   Most Recent Attempt Actual Lethality Code 2   Most Lethal Attempt Actual Lethality Code 2   Initial/First Attempt Actual Lethality Code 2       Appearance:   Unable to assess   Eye Contact:   Unable to assess   Psychomotor Behavior: Unable to assess    Attitude:   Cooperative  Friendly Pleasant  Orientation:   All  Speech   Rate / Production: Normal    Volume:  Normal   Mood:    Angry  Depressed   Affect:    Appropriate   Thought Content:  Clear   Thought Form:  Coherent  Logical   Insight:    Good     Diagnoses:  1. Major depressive disorder, recurrent episode, mild (H)        Collateral Reports Completed:   Not Applicable    Plan: (Homework, other):  Shadia was given information about behavioral services and encouraged to schedule a follow up appointment with the clinic [facility] in 1 month. She was given further adaptive strategies to manage irritability/anger difficulties in relationships. She was encouraged to schedule sooner if needed. CD Recommendations: No indications of CD issues.     Edmundo Hansen Psy.D, LP   Behavioral Health Clinician   [facility] [place]     January 14, 2022          ______________________________________________________________________    [facility] Integrated Behavioral Health Treatment Plan    Client's Name: Shadia Mcgrath  YOB: 1980    Date: January 14, 2022      DSM-V Diagnoses: 296.31 (F33.0) Major Depressive Disorder, Recurrent Episode, Mild _  WHODAS: none on file  Clinical Global Impressions  First:  Considering your total clinical experience with this particular patient population, how severe are the patient's symptoms at this time?: 2 (10/4/2021  9:51 AM)      Most recent:  Compared to the patient's condition at the START of treatment, this patient's condition is: 2 (10/4/2021  9:51 AM)      Referral / Collaboration:  Will collaborate with care team as indicated during treatment.    Anticipated number of session or this episode of care: 10+      MeasurableTreatment Goal(s) related to diagnosis / functional impairment(s)  Goal 1:  Patient will experience a reduction in depressive and anxious symptoms, along with a corresponding increase in positive emotion and life  satisfaction.    Objective #A: Patient will experience a reduction in depressed mood, will develop more effective coping skills to manage depressive symptoms, will develop healthy cognitive patterns and beliefs, will increase ability to function adaptively and will continue to take medications as prescribed / participate in supportive activities and services    Status: Continued - Date(s):COMPLETED    Objective #B: Patient will experience a reduction in anxiety, will develop more effective coping skills to manage anxiety symptoms, will develop healthy cognitive patterns and beliefs and will increase ability to function adaptively  Status: Continued - Date(s): COMPLETED    Objective #C: Patient will develop better understanding of triggers and coping strategies to stabilize mood  Status: Continued - Date(s): COMPLETED    Goal 2:  Patient will identify and increase engagement in valued activity, i.e. improving social connections/relationships, pursuing occupational goals or personally meaningful pursuits, exploration of meaning in life.     Objective #A: Patient will identify meaningful activity in social, occupational and  personal goals, and increase behavioral activation around these goals   Status: Continued - Date(s): COMPLETED    Objective #B: Patient will address relationship difficulties in a more adaptive manner  Status: Continued - Date(s): COMPLETED    Objective #C: Patient will develop coping/problem-solving skills to facilitate more adaptive adjustment and will effectively address problems that interfere with adaptive functioning  Status: Continued - Date(s):  January 14, 2022        Possible Therapeutic Intervention(s)  Psycho-education regarding mental health diagnoses and treatment options      Skills training    Explore skills useful to client in current situation.    Skills include assertiveness, communication, conflict management, problem-solving, relaxation, etc.    Solution-Focused  Therapy    Explore patterns in patient's relationships and discuss options for new behaviors.    Explore patterns in patient's actions and choices and discuss options for new behaviors.    Cognitive-behavioral Therapy    Discuss common cognitive distortions, identify them in patient's life.    Explore ways to challenge, replace, and act against these cognitions.    Acceptance and Commitment Therapy    Explore and identify important values in patient's life.    Discuss ways to commit to behavioral activation around these values.    Psychodynamic psychotherapy    Discuss patient's emotional dynamics and issues and how they impact behaviors.    Explore patient's history of relationships and how they impact present behaviors.    Explore how to work with and make changes in these schemas and patterns.    Narrative Therapy    Explore the patient's story of his/her life from his/her perspective.    Explore alternate ways of understanding their experience, identifying exceptions, developing new themes.    Interpersonal Psychotherapy    Explore patterns in relationships that are effective or ineffective at helping patient reach their goals, find satisfying experience.    Discuss new patterns or behaviors to engage in for improved social functioning.    Behavioral Activation    Discuss steps patient can take to become more involved in meaningful activity.    Identify barriers to these activities and explore possible solutions.    Mindfulness-Based Strategies    Discuss skills based on development and application of mindfulness.    Skills drawn from compassion-focused therapy, dialectical behavior therapy, mindfulness-based stress reduction, mindfulness-based cognitive therapy, etc.      We have developed these goals together during our work to this point. Patient has assisted in the development of these goals and has agreed to this treatment plan.       Edmundo Hansen LP  January 14, 2022

## 2022-01-30 ENCOUNTER — HEALTH MAINTENANCE LETTER (OUTPATIENT)
Age: 42
End: 2022-01-30

## 2022-02-04 ENCOUNTER — VIRTUAL VISIT (OUTPATIENT)
Dept: BEHAVIORAL HEALTH | Facility: CLINIC | Age: 42
End: 2022-02-04
Payer: COMMERCIAL

## 2022-02-04 DIAGNOSIS — F33.0 MAJOR DEPRESSIVE DISORDER, RECURRENT EPISODE, MILD (H): Primary | ICD-10-CM

## 2022-02-04 PROCEDURE — 90834 PSYTX W PT 45 MINUTES: CPT | Mod: 95 | Performed by: PSYCHOLOGIST

## 2022-02-04 NOTE — PROGRESS NOTES
"[facility]: Integrated Behavioral Health   February 4, 2022      Behavioral Health Clinician Progress Note    Patient Name: Shadia Mcgrath           Service Type: Phone Visit      Service Location:  Phone Visit      Session Start Time: 9:05  Session End Time: 9:51      Session Length: 38 - 52      Attendees: Patient     Visit Activities (Refresh list every visit): Phone Encounter    Shadia Mcgrath is a 40 year old female who is being evaluated via a telephone visit.      The patient has been notified of the following:     \"We have found that certain health care needs can be provided without the need for a face to face visit.  This service lets us provide the care you need with a short phone conversation.      I will have full access to your [place] medical record during this entire phone call.   I will be taking notes for your medical record.     Since this is like an office visit, we will bill your insurance company for this service.  Please check with your medical insurance if this type of telephone visit/virtual care is covered.  You may be responsible for the cost of this service if insurance coverage is denied.      There are potential benefits and risks of telephone visits (e.g. limits to patient confidentiality) that differ from in-person visits.?  Confidentiality still applies for telephone services, and nobody will record the visit.  It is important to be in a quiet, private space that is free of distractions (including cell phone or other devices) during the visit.??     If during the course of the call I believe a telephone visit is not appropriate, you will not be charged for this service\"    Consent has been obtained for this service by care team member: yes.    Diagnostic Assessment Date: 10/5/2020  Treatment Plan Review Date: 4/14/2022  See Flowsheets for today's PHQ-9 and ZACH-7 results  Previous PHQ-9:   PHQ-9 SCORE 8/27/2020   PHQ-9 Total Score 13 "     Previous ZACH-7: No flowsheet data found.       DATA  Extended Session (60+ minutes): No  Interactive Complexity: No  Crisis: No    Treatment Objective(s) Addressed in This Session:  Target Behavior(s): disease management/lifestyle changes related to psychosocial and relational stress    Depressed Mood: Increase interest, engagement, and pleasure in doing things  Decrease frequency and intensity of feeling down, depressed, hopeless  Anger Management: will learn strategies to resolve conflict adaptively and will learn and practice positive anger management skills        Current Stressors / Issues:  [facility] met with Shadia today over the phone to continue supporting her treatment of depression, reported difficulties with anger and relationship problems. We discussed the following topics today:    Shadia presents today with a positive affect. She reports believing she handled a recent antagonistic interpersonal encounter well and wished to share how this went for her. Reported a co-worker became agitaged and confrontational with her recently. She stated that instead of reacting right away, she found herself laughing at the situation. She noted however the individual became increasingly agitated when she laughed, so they became critical and began yelling louder. Shadia indicated she responded with anger and said a few pejorative statements which she later apologized for.     We talked about learning to identify bodily signals of anger, how she knows she might begin to act on her feelings. Shadia identified several bodily signs, including increased heart rate, tightness in her chest, shoulder tension, watery eyes, flushed face, and shallow breathing as signs of anger for her. Explored when and how she could walk away from such conflicts to avoid acting on her anger and reduce risk of harm, to herself and others. For HW, we talked about continuing to identify bodily signals of anger and knowing when to walk away from  interpersonal conflicts or agitated individuals.       Progress on Treatment Objective(s) / Homework:  Satisfactory progress - ACTION (Actively working towards change); Intervened by reinforcing change plan / affirming steps taken    Care Plan review completed: No     Interpersonal Psychotherapy    Explore patterns in relationships that are effective or ineffective at helping patient reach their goals, find satisfying experience.    Discuss new patterns or behaviors to engage in for improved social functioning.    Skills training    Explore skills useful to client in current situation.    Skills include assertiveness, communication, conflict management, problem-solving, relaxation, etc.    Solution-Focused Therapy    Explore patterns in patient's relationships and discuss options for new behaviors.    Explore patterns in patient's actions and choices and discuss options for new behaviors.    Medication Review:  No changes to current psychiatric medication(s)    Medication Compliance:  Yes    Changes in Health Issues:   None reported    Chemical Use Review:   Substance Use: Chemical use reviewed, no active concerns identified      Tobacco Use: No current tobacco use.      Assessment: Current Emotional / Mental Status (status of significant symptoms):  Risk status (Self / Other harm or suicidal ideation)  Patient has had a history of suicidal ideation: SI as a youth only and suicide attempts: one prior attempt at age 15; none recent  Patient denies current fears or concerns for personal safety.     Patient denies current or recent suicidal ideation or behaviors.  Patient denies current or recent homicidal ideation or behaviors.  Patient denies current or recent self injurious behavior or ideation.  Patient denies other safety concerns.     A safety and risk management plan has not been developed at this time, however patient was encouraged to call [facility] should there be a change in any of these risk  factors.    [place] Suicide Severity Rating Scale (Lifetime/Recent)  [place] Suicide Severity Rating (Lifetime/Recent) 9/21/2020   1. Wish to be Dead (Lifetime) Yes   1. Wish to be Dead (Recent) No   2. Non-Specific Active Suicidal Thoughts (Lifetime) Yes   2. Non-Specific Active Suicidal Thoughts (Recent) No   3. Active Suicidal Ideation with any Methods (Not Plan) Without Intent to Act (Lifetime) Yes   3. Active Suicidal Ideation with any Methods (Not Plan) Without Intent to Act (Recent) No   4. Active Suicidal Ideation with Some Intent to Act, Without Specific Plan (Lifetime) Yes   4. Active Suicidal Ideation with Some Intent to Act, Without Specific Plan (Recent) No   5. Active Suicidal Ideation with Specific Plan and Intent (Lifetime) Yes   5. Active Suicidal Ideation with Specific Plan and Intent (Recent) No   Most Severe Ideation Rating (Lifetime) 4   Frequency (Lifetime) 4   Duration (Lifetime) 4   Controllability (Lifetime) 3   Protective Factors  (Lifetime) 2   Reasons for Ideation (Lifetime) 4   Most Severe Ideation Rating (Past Month) NA   Frequency (Past Month) NA   Duration (Past Month) NA   Controllability (Past Month) NA   Protective Factors (Past Month) NA   Reasons for Ideation (Past Month) NA   Actual Attempt (Lifetime) Yes   Actual Attempt (Past 3 Months) No   Has subject engaged in non-suicidal self-injurious behavior? (Lifetime) No   Has subject engaged in non-suicidal self-injurious behavior? (Past 3 Months) No   Interrupted Attempts (Lifetime) No   Interrupted Attempts (Past 3 Months) No   Aborted or Self-Interrupted Attempt (Lifetime) No   Aborted or Self-Interrupted Attempt (Past 3 Months) No   Preparatory Acts or Behavior (Lifetime) No   Preparatory Acts or Behavior (Past 3 Months) No   Most Recent Attempt Actual Lethality Code 2   Most Lethal Attempt Actual Lethality Code 2   Initial/First Attempt Actual Lethality Code 2       Appearance:   Unable to assess   Eye Contact:   Unable to  assess   Psychomotor Behavior: Unable to assess   Attitude:   Cooperative  Friendly Pleasant  Orientation:   All  Speech   Rate / Production: Normal    Volume:  Normal   Mood:    Angry  Depressed   Affect:    Appropriate   Thought Content:  Clear   Thought Form:  Coherent  Logical   Insight:    Good     Diagnoses:  1. Major depressive disorder, recurrent episode, mild (H)        Collateral Reports Completed:   Not Applicable    Plan: (Homework, other):  Shadia was given information about behavioral services and encouraged to schedule a follow up appointment with the clinic [facility] in 1 month. She was given further adaptive strategies to manage irritability/anger difficulties in relationships. She was encouraged to schedule sooner if needed. CD Recommendations: No indications of CD issues.     Edmundo Hansen Psy.D, LP   Behavioral Health Clinician   [facility]     February 4, 2022        ______________________________________________________________________    [facility] Integrated Behavioral Health Treatment Plan    Client's Name: Shadia Mcgrath  YOB: 1980    Date: January 14, 2022      DSM-V Diagnoses: 296.31 (F33.0) Major Depressive Disorder, Recurrent Episode, Mild _  WHODAS: none on file  Clinical Global Impressions  First:  Considering your total clinical experience with this particular patient population, how severe are the patient's symptoms at this time?: 2 (10/4/2021  9:51 AM)      Most recent:  Compared to the patient's condition at the START of treatment, this patient's condition is: 2 (10/4/2021  9:51 AM)      Referral / Collaboration:  Will collaborate with care team as indicated during treatment.    Anticipated number of session or this episode of care: 10+      MeasurableTreatment Goal(s) related to diagnosis / functional impairment(s)  Goal 1:  Patient will experience a reduction in depressive and anxious symptoms, along with a corresponding increase in  positive emotion and life satisfaction.    Objective #A: Patient will experience a reduction in depressed mood, will develop more effective coping skills to manage depressive symptoms, will develop healthy cognitive patterns and beliefs, will increase ability to function adaptively and will continue to take medications as prescribed / participate in supportive activities and services    Status: Continued - Date(s):COMPLETED    Objective #B: Patient will experience a reduction in anxiety, will develop more effective coping skills to manage anxiety symptoms, will develop healthy cognitive patterns and beliefs and will increase ability to function adaptively  Status: Continued - Date(s): COMPLETED    Objective #C: Patient will develop better understanding of triggers and coping strategies to stabilize mood  Status: Continued - Date(s): COMPLETED    Goal 2:  Patient will identify and increase engagement in valued activity, i.e. improving social connections/relationships, pursuing occupational goals or personally meaningful pursuits, exploration of meaning in life.     Objective #A: Patient will identify meaningful activity in social, occupational and  personal goals, and increase behavioral activation around these goals   Status: Continued - Date(s): COMPLETED    Objective #B: Patient will address relationship difficulties in a more adaptive manner  Status: Continued - Date(s): COMPLETED    Objective #C: Patient will develop coping/problem-solving skills to facilitate more adaptive adjustment and will effectively address problems that interfere with adaptive functioning  Status: Continued - Date(s):  January 14, 2022        Possible Therapeutic Intervention(s)  Psycho-education regarding mental health diagnoses and treatment options      Skills training    Explore skills useful to client in current situation.    Skills include assertiveness, communication, conflict management, problem-solving, relaxation,  etc.    Solution-Focused Therapy    Explore patterns in patient's relationships and discuss options for new behaviors.    Explore patterns in patient's actions and choices and discuss options for new behaviors.    Cognitive-behavioral Therapy    Discuss common cognitive distortions, identify them in patient's life.    Explore ways to challenge, replace, and act against these cognitions.    Acceptance and Commitment Therapy    Explore and identify important values in patient's life.    Discuss ways to commit to behavioral activation around these values.    Psychodynamic psychotherapy    Discuss patient's emotional dynamics and issues and how they impact behaviors.    Explore patient's history of relationships and how they impact present behaviors.    Explore how to work with and make changes in these schemas and patterns.    Narrative Therapy    Explore the patient's story of his/her life from his/her perspective.    Explore alternate ways of understanding their experience, identifying exceptions, developing new themes.    Interpersonal Psychotherapy    Explore patterns in relationships that are effective or ineffective at helping patient reach their goals, find satisfying experience.    Discuss new patterns or behaviors to engage in for improved social functioning.    Behavioral Activation    Discuss steps patient can take to become more involved in meaningful activity.    Identify barriers to these activities and explore possible solutions.    Mindfulness-Based Strategies    Discuss skills based on development and application of mindfulness.    Skills drawn from compassion-focused therapy, dialectical behavior therapy, mindfulness-based stress reduction, mindfulness-based cognitive therapy, etc.      We have developed these goals together during our work to this point. Patient has assisted in the development of these goals and has agreed to this treatment plan.       Edmundo Hansen LP  January 14, 2022

## 2022-02-14 ENCOUNTER — VIRTUAL VISIT (OUTPATIENT)
Dept: PULMONOLOGY | Facility: CLINIC | Age: 42
End: 2022-02-14
Payer: COMMERCIAL

## 2022-02-14 DIAGNOSIS — J45.909 SEVERE ASTHMA, UNSPECIFIED WHETHER COMPLICATED, UNSPECIFIED WHETHER PERSISTENT: Primary | ICD-10-CM

## 2022-02-14 PROCEDURE — 99214 OFFICE O/P EST MOD 30 MIN: CPT | Mod: 95

## 2022-02-14 NOTE — LETTER
2/14/2022     RE: Shadia Mcgrath  [id number] [address] [facility] [id number]    Dear Colleague,    Thank you for referring your patient, Shadia Mcgrath, to the [facility]. Please see a copy of my visit note below.    Imani is a 41 year old who is being evaluated via a billable video visit.      How would you like to obtain your AVS? [employer]  If the video visit is dropped, the invitation should be resent by: Other e-mail: [email or web address]  Will anyone else be joining your video visit? No      Video Start Time: 10:30  Video-Visit Details    Type of service:  Video Visit    Video End Time:10:45    Originating Location (pt. Location): Home    Distant Location (provider location):  [facility]     Platform used for Video Visit: [facility]  Pulmonary Medicine  Visit Clinic Note  Feb 14th, 2022         ASSESSMENT & PLAN     History of asthma    She has had stable respiratory symptoms.  No recent exacerbations.  Currently on ICS, LABA, albuterol as needed, and daily azithromycin.  It is possible that the daily antibiotic could be preventing exacerbations for her, however she has been on this for at least a year now and I think it is reasonable to take a break from it and see how her symptoms change if they do.  She is a bit hesitant to stop taking azithromycin during the winter, so we decided that she would stop it when the weather got nicer outside.  Additionally, she will continue getting pulmonary follow-up with her local pulmonologist, Dr. Cruz.  She is going to set up a visit with him in late spring or early summer    There is no plan for any further work-up or evaluation for hypogammaglobulinemia at this point.  I think this seems appropriate given her good symptom control.    Kleber Guy MD          Today's visit note:     Chief Complaint: Shadia Noel  Ramila is a 41 year old year old female who is being seen for asthma    HISTORY OF PRESENT ILLNESS:    Last visit was back in July 2021.  Since that time, there is no record of any respiratory exacerbation.  No antibiotics or steroids aside from her daily azithromycin.  She does not recall receiving antibiotics or steroids from last visit either.  Overall she reports good respiratory health.  She was recently diagnosed with COVID-19 in late January, but was completely asymptomatic.  She takes Breo, daily azithromycin and albuterol as needed.           Past Medical and Surgical History:     Past Medical History:  Past Medical History:   Diagnosis Date     ADHD (attention deficit hyperactivity disorder)     Asthma   Persistent asthma, steroid dependent     Bilateral chronic knee pain     Bipolar 1 disorder (HCC)     Delayed emergence from general anesthesia     Depression     Fibromyalgia     GERD (gastroesophageal reflux disease)     HCD (health care directive) 10/10/2017     DAPHNE (obstructive sleep apnea)     Osteomyelitis (HCC)     Osteopenia     Seasonal allergic rhinitis     Seizures (HCC)   ? partial seizures / none for somr time. due brain trauma obtained in 2016     Vitamin D deficiency     Past Surgical History:   Procedure Laterality Date     GASTRIC FUNDOPLICATION 1998     KNEE SURGERY Right 4/2009     KNEE SURGERY Left 12/2004     REMOVAL GALLBLADDER     SIMON-EN-Y GASTRIC BYPASS 12/06     SINUS SURGERY     TOTAL KNEE ARTHROPLASTY Left 10/10/2017   Procedure: LEFT ARTHROPLASTY TOTAL KNEE; Surgeon: Yakov Choi MD; Location: [facility] MAIN OR         Family History:     Family History   Problem Relation Age of Onset     No Known Problems Mother      No Known Problems Father      No Known Problems Sister      No Known Problems Brother             Social History:     Social History     Socioeconomic History     Marital status: Single     Spouse name: Not on file     Number of children: Not on file      Years of education: Not on file     Highest education level: Not on file   Occupational History     Not on file   Tobacco Use     Smoking status: Never Smoker     Smokeless tobacco: Never Used   Substance and Sexual Activity     Alcohol use: Yes     Comment: occassional      Drug use: Yes     Types: Marijuana     Comment: rare     Sexual activity: Not on file   Other Topics Concern     Parent/sibling w/ CABG, MI or angioplasty before 65F 55M? Not Asked   Social History Narrative    10/29/20        ENVIRONMENTAL HISTORY: [place] The home is heated with a forced air. They does have central air conditioning. The patient's bedroom is furnished with hard ryan in bedroom.  Pets inside the house include 3 cat(s). There is no history of cockroach or mice infestation. There is/are 0 smokers in the house.  The house does not have a damp basement.      Social Determinants of Health     Financial Resource Strain: Not on file   Food Insecurity: Not on file   Transportation Needs: Not on file   Physical Activity: Not on file   Stress: Not on file   Social Connections: Not on file   Intimate Partner Violence: Not on file   Housing Stability: Not on file            Medications:     Current Outpatient Medications   Medication     acetaminophen (TYLENOL) 500 MG tablet     adapalene (DIFFERIN) 0.1 % external gel     albuterol (PROVENTIL) (2.5 MG/3ML) 0.083% neb solution     amphetamine-dextroamphetamine (ADDERALL) 20 MG tablet     azelastine (ASTELIN) 0.1 % nasal spray     azithromycin (ZITHROMAX) 250 MG tablet     Cetirizine HCl (ZYRTEC PO)     chlorhexidine (PERIDEX) 0.12 % solution     cholecalciferol ( ULTRA STRENGTH) 50 MCG (2000 UT) CAPS     escitalopram (LEXAPRO) 10 MG tablet     fluticasone (FLONASE) 50 MCG/ACT nasal spray     fluticasone-vilanterol (BREO ELLIPTA) 200-25 MCG/INH inhaler     gabapentin (NEURONTIN) 300 MG capsule     ibuprofen (ADVIL/MOTRIN) 800 MG  tablet     lamoTRIgine (LAMICTAL) 25 MG tablet     lisdexamfetamine (VYVANSE) 60 MG capsule     methocarbamol (ROBAXIN) 500 MG tablet     minoxidil (ROGAINE) 2 % external solution     Montelukast Sodium (SINGULAIR PO)     mupirocin (BACTROBAN) 2 % external ointment     omeprazole (PRILOSEC) 20 MG DR capsule     No current facility-administered medications for this visit.            Review of Systems:     A complete review of systems was otherwise negative except as noted in the HPI.    PHYSICAL EXAM:  There were no vitals taken for this visit.     This was a video visit exam         Data:   All laboratory and imaging data reviewed.      PFT:   Pulmonary function testing performed on January 20, 2021 shows an FEV1/FVC ratio of 0.78.  Her FVC is 3.77 L which is 93% predicted, and the FEV1 is 2.95 L which is 90% predicted.  The residual volume is 116% predicted and the total lung capacity is 105% predicted.  Diffusion capacity is 130% predicted.       PFT Interpretation:  No airflow obstruction.  Normal diffusion capacity.  There is improved vital capacity and FEV1 when compared to spirometry in September 2019.    Chest CT:   9/2019: tree-in-bud opacities. Left lower lobe consolidative changes.    Again, thank you for allowing me to participate in the care of your patient.      Sincerely,    Simone Guy MD

## 2022-02-14 NOTE — PROGRESS NOTES
Imani is a 41 year old who is being evaluated via a billable video visit.      How would you like to obtain your AVS? [employer]  If the video visit is dropped, the invitation should be resent by: Other e-mail: [email or web address]  Will anyone else be joining your video visit? No      Video Start Time: 10:30  Video-Visit Details    Type of service:  Video Visit    Video End Time:10:45    Originating Location (pt. Location): Home    Distant Location (provider location):  [facility]     Platform used for Video Visit: [facility]  Pulmonary Medicine  Visit Clinic Note  Feb 14th, 2022         ASSESSMENT & PLAN     History of asthma    She has had stable respiratory symptoms.  No recent exacerbations.  Currently on ICS, LABA, albuterol as needed, and daily azithromycin.  It is possible that the daily antibiotic could be preventing exacerbations for her, however she has been on this for at least a year now and I think it is reasonable to take a break from it and see how her symptoms change if they do.  She is a bit hesitant to stop taking azithromycin during the winter, so we decided that she would stop it when the weather got nicer outside.  Additionally, she will continue getting pulmonary follow-up with her local pulmonologist, Dr. Cruz.  She is going to set up a visit with him in late spring or early summer    There is no plan for any further work-up or evaluation for hypogammaglobulinemia at this point.  I think this seems appropriate given her good symptom control.    Kleber Guy MD          Today's visit note:     Chief Complaint: Shadia Mcgrath is a 41 year old year old female who is being seen for asthma    HISTORY OF PRESENT ILLNESS:    Last visit was back in July 2021.  Since that time, there is no record of any respiratory exacerbation.  No antibiotics or steroids aside from her daily azithromycin.  She does not recall  receiving antibiotics or steroids from last visit either.  Overall she reports good respiratory health.  She was recently diagnosed with COVID-19 in late January, but was completely asymptomatic.  She takes Breo, daily azithromycin and albuterol as needed.           Past Medical and Surgical History:     Past Medical History:  Past Medical History:   Diagnosis Date     ADHD (attention deficit hyperactivity disorder)     Asthma   Persistent asthma, steroid dependent     Bilateral chronic knee pain     Bipolar 1 disorder (HCC)     Delayed emergence from general anesthesia     Depression     Fibromyalgia     GERD (gastroesophageal reflux disease)     HCD (health care directive) 10/10/2017     DAPHNE (obstructive sleep apnea)     Osteomyelitis (HCC)     Osteopenia     Seasonal allergic rhinitis     Seizures (HCC)   ? partial seizures / none for somr time. due brain trauma obtained in 2016     Vitamin D deficiency     Past Surgical History:   Procedure Laterality Date     GASTRIC FUNDOPLICATION 1998     KNEE SURGERY Right 4/2009     KNEE SURGERY Left 12/2004     REMOVAL GALLBLADDER     SIMON-EN-Y GASTRIC BYPASS 12/06     SINUS SURGERY     TOTAL KNEE ARTHROPLASTY Left 10/10/2017   Procedure: LEFT ARTHROPLASTY TOTAL KNEE; Surgeon: Yakov Choi MD; Location: [facility] MAIN OR         Family History:     Family History   Problem Relation Age of Onset     No Known Problems Mother      No Known Problems Father      No Known Problems Sister      No Known Problems Brother             Social History:     Social History     Socioeconomic History     Marital status: Single     Spouse name: Not on file     Number of children: Not on file     Years of education: Not on file     Highest education level: Not on file   Occupational History     Not on file   Tobacco Use     Smoking status: Never Smoker     Smokeless tobacco: Never Used   Substance and Sexual Activity     Alcohol use: Yes     Comment: occassional      Drug use: Yes      Types: Marijuana     Comment: rare     Sexual activity: Not on file   Other Topics Concern     Parent/sibling w/ CABG, MI or angioplasty before 65F 55M? Not Asked   Social History Narrative    10/29/20        ENVIRONMENTAL HISTORY: [place] The home is heated with a forced air. They does have central air conditioning. The patient's bedroom is furnished with hard ryan in bedroom.  Pets inside the house include 3 cat(s). There is no history of cockroach or mice infestation. There is/are 0 smokers in the house.  The house does not have a damp basement.      Social Determinants of Health     Financial Resource Strain: Not on file   Food Insecurity: Not on file   Transportation Needs: Not on file   Physical Activity: Not on file   Stress: Not on file   Social Connections: Not on file   Intimate Partner Violence: Not on file   Housing Stability: Not on file            Medications:     Current Outpatient Medications   Medication     acetaminophen (TYLENOL) 500 MG tablet     adapalene (DIFFERIN) 0.1 % external gel     albuterol (PROVENTIL) (2.5 MG/3ML) 0.083% neb solution     amphetamine-dextroamphetamine (ADDERALL) 20 MG tablet     azelastine (ASTELIN) 0.1 % nasal spray     azithromycin (ZITHROMAX) 250 MG tablet     Cetirizine HCl (ZYRTEC PO)     chlorhexidine (PERIDEX) 0.12 % solution     cholecalciferol ( ULTRA STRENGTH) 50 MCG (2000 UT) CAPS     escitalopram (LEXAPRO) 10 MG tablet     fluticasone (FLONASE) 50 MCG/ACT nasal spray     fluticasone-vilanterol (BREO ELLIPTA) 200-25 MCG/INH inhaler     gabapentin (NEURONTIN) 300 MG capsule     ibuprofen (ADVIL/MOTRIN) 800 MG tablet     lamoTRIgine (LAMICTAL) 25 MG tablet     lisdexamfetamine (VYVANSE) 60 MG capsule     methocarbamol (ROBAXIN) 500 MG tablet     minoxidil (ROGAINE) 2 % external solution     Montelukast Sodium (SINGULAIR PO)     mupirocin (BACTROBAN) 2 % external ointment     omeprazole (PRILOSEC) 20 MG  DR capsule     No current facility-administered medications for this visit.            Review of Systems:     A complete review of systems was otherwise negative except as noted in the HPI.    PHYSICAL EXAM:  There were no vitals taken for this visit.     This was a video visit exam         Data:   All laboratory and imaging data reviewed.      PFT:   Pulmonary function testing performed on January 20, 2021 shows an FEV1/FVC ratio of 0.78.  Her FVC is 3.77 L which is 93% predicted, and the FEV1 is 2.95 L which is 90% predicted.  The residual volume is 116% predicted and the total lung capacity is 105% predicted.  Diffusion capacity is 130% predicted.       PFT Interpretation:  No airflow obstruction.  Normal diffusion capacity.  There is improved vital capacity and FEV1 when compared to spirometry in September 2019.    Chest CT:   9/2019: tree-in-bud opacities. Left lower lobe consolidative changes.

## 2022-03-04 ENCOUNTER — VIRTUAL VISIT (OUTPATIENT)
Dept: BEHAVIORAL HEALTH | Facility: CLINIC | Age: 42
End: 2022-03-04
Payer: COMMERCIAL

## 2022-03-04 DIAGNOSIS — Z53.9 ERRONEOUS ENCOUNTER--DISREGARD: Primary | ICD-10-CM

## 2022-03-04 NOTE — PROGRESS NOTES
Requested reschedule upon call - R/S for 3/8/22    Edmundo Hansen Psy.D, LP   Behavioral Health Clinician   [facility]

## 2022-03-08 ENCOUNTER — VIRTUAL VISIT (OUTPATIENT)
Dept: BEHAVIORAL HEALTH | Facility: CLINIC | Age: 42
End: 2022-03-08
Payer: COMMERCIAL

## 2022-03-08 DIAGNOSIS — F33.0 MAJOR DEPRESSIVE DISORDER, RECURRENT EPISODE, MILD (H): Primary | ICD-10-CM

## 2022-03-08 PROCEDURE — 90832 PSYTX W PT 30 MINUTES: CPT | Mod: 95 | Performed by: PSYCHOLOGIST

## 2022-03-08 NOTE — PROGRESS NOTES
"[facility] Integrated Behavioral Health   March 8, 2022      Behavioral Health Clinician Progress Note    Patient Name: Shadia Mcgrath           Service Type: Phone Visit      Service Location:  Phone Visit      Session Start Time: 9:00  Session End Time: 9:27      Session Length: 16 - 37      Attendees: Patient     Visit Activities (Refresh list every visit): Phone Encounter    Shadia Mcgrath is a 40 year old female who is being evaluated via a telephone visit.      The patient has been notified of the following:     \"We have found that certain health care needs can be provided without the need for a face to face visit.  This service lets us provide the care you need with a short phone conversation.      I will have full access to your [facility] medical record during this entire phone call.   I will be taking notes for your medical record.     Since this is like an office visit, we will bill your insurance company for this service.  Please check with your medical insurance if this type of telephone visit/virtual care is covered.  You may be responsible for the cost of this service if insurance coverage is denied.      There are potential benefits and risks of telephone visits (e.g. limits to patient confidentiality) that differ from in-person visits.?  Confidentiality still applies for telephone services, and nobody will record the visit.  It is important to be in a quiet, private space that is free of distractions (including cell phone or other devices) during the visit.??     If during the course of the call I believe a telephone visit is not appropriate, you will not be charged for this service\"    Consent has been obtained for this service by care team member: yes.    Diagnostic Assessment Date: 10/5/2020  Treatment Plan Review Date: 4/14/2022  See Flowsheets for today's PHQ-9 and ZACH-7 results  Previous PHQ-9:   PHQ-9 SCORE 8/27/2020   PHQ-9 Total Score 13 " "    Previous ZACH-7: No flowsheet data found.       DATA  Extended Session (60+ minutes): No  Interactive Complexity: No  Crisis: No    Treatment Objective(s) Addressed in This Session:  Target Behavior(s): disease management/lifestyle changes related to psychosocial and relational stress    Depressed Mood: Increase interest, engagement, and pleasure in doing things  Decrease frequency and intensity of feeling down, depressed, hopeless  Anger Management: will learn strategies to resolve conflict adaptively and will learn and practice positive anger management skills        Current Stressors / Issues:  [facility] met with Shadia today over the phone to continue supporting her treatment of depression, reported difficulties with anger and relationship problems. We discussed the following topics today:    Shadia presents today with a positive affect. She describes feeling upbeat and excited due to multiple positive events in her life. She is spending more time with her daughters after an extended period of separation from them (states not seeing them for 6 years) and is feeling hopeful and excited by this. She also reports getting a promotion at work which allows her more freedom and ability to do things she enjoys.     She states doing well implementing the skills we discussed and notes handling her anger well. We spent some time re-framing the idea of managing her anger as being \"fake\" to others. Instead, we talked about how maintaining composure and using skills is valuable and can still be considered genuine to others. Briefly discussed how \"getting too real\" with others in an effort to not be \"fake\" has lead to undesired outcomes before.     Briefly reviewed treatment plan and steps moving forward. She would like to continue sessions to continue working on mood and anger, citing success recently. Agreed to follow-up in about a month. Revised treatment plan    Progress on Treatment Objective(s) / Homework:  Satisfactory " progress - ACTION (Actively working towards change); Intervened by reinforcing change plan / affirming steps taken    Care Plan review completed: Yes     Interpersonal Psychotherapy    Explore patterns in relationships that are effective or ineffective at helping patient reach their goals, find satisfying experience.    Discuss new patterns or behaviors to engage in for improved social functioning.    Skills training    Explore skills useful to client in current situation.    Skills include assertiveness, communication, conflict management, problem-solving, relaxation, etc.    Solution-Focused Therapy    Explore patterns in patient's relationships and discuss options for new behaviors.    Explore patterns in patient's actions and choices and discuss options for new behaviors.    Medication Review:  No changes to current psychiatric medication(s)    Medication Compliance:  Yes    Changes in Health Issues:   None reported    Chemical Use Review:   Substance Use: Chemical use reviewed, no active concerns identified      Tobacco Use: No current tobacco use.      Assessment: Current Emotional / Mental Status (status of significant symptoms):  Risk status (Self / Other harm or suicidal ideation)  Patient has had a history of suicidal ideation: SI as a youth only and suicide attempts: one prior attempt at age 15; none recent  Patient denies current fears or concerns for personal safety.     Patient denies current or recent suicidal ideation or behaviors.  Patient denies current or recent homicidal ideation or behaviors.  Patient denies current or recent self injurious behavior or ideation.  Patient denies other safety concerns.     A safety and risk management plan has not been developed at this time, however patient was encouraged to call [facility] should there be a change in any of these risk factors.    [place] Suicide Severity Rating Scale (Lifetime/Recent)  [place] Suicide Severity Rating (Lifetime/Recent)  9/21/2020   Wish to be Dead (Lifetime) Yes   Non-Specific Active Suicidal Thoughts (Lifetime) Yes   Most Severe Ideation Rating (Lifetime) 4   Frequency (Lifetime) 4   Duration (Lifetime) 4   Controllability (Lifetime) 3   Protective Factors  (Lifetime) 2   Reasons for Ideation (Lifetime) 4   RETIRED: 1. Wish to be Dead (Recent) No   RETIRED: 2. Non-Specific Active Suicidal Thoughts (Recent) No   3. Active Suicidal Ideation with any Methods (Not Plan) Without Intent to Act (Lifetime) Yes   RETIRED: 3. Active Suicidal Ideation with any Methods (Not Plan) Without Intent to Act (Recent) No   RETIRE: 4. Active Suicidal Ideation with Some Intent to Act, Without Specific Plan (Lifetime) Yes   4. Active Suicidal Ideation with Some Intent to Act, Without Specific Plan (Recent) No   RETIRE: 5. Active Suicidal Ideation with Specific Plan and Intent (Lifetime) Yes   RETIRED: 5. Active Suicidal Ideation with Specific Plan and Intent (Recent) No   Most Severe Ideation Rating (Past Month) NA   Frequency (Past Month) NA   Duration (Past Month) NA   Controllability (Past Month) NA   Protective Factors (Past Month) NA   Reasons for Ideation (Past Month) NA   Actual Attempt (Lifetime) Yes   Actual Attempt (Past 3 Months) No   Has subject engaged in non-suicidal self-injurious behavior? (Lifetime) No   Has subject engaged in non-suicidal self-injurious behavior? (Past 3 Months) No   Interrupted Attempts (Lifetime) No   Interrupted Attempts (Past 3 Months) No   Aborted or Self-Interrupted Attempt (Lifetime) No   Aborted or Self-Interrupted Attempt (Past 3 Months) No   Preparatory Acts or Behavior (Lifetime) No   Preparatory Acts or Behavior (Past 3 Months) No   Most Recent Attempt Actual Lethality Code 2   Most Lethal Attempt Actual Lethality Code 2   Initial/First Attempt Actual Lethality Code 2       Appearance:   Unable to assess   Eye Contact:   Unable to assess   Psychomotor Behavior: Unable to assess   Attitude:   Cooperative   Friendly Pleasant  Orientation:   All  Speech   Rate / Production: Normal    Volume:  Normal   Mood:    Angry  Depressed   Affect:    Appropriate   Thought Content:  Clear   Thought Form:  Coherent  Logical   Insight:    Good     Diagnoses:  1. Major depressive disorder, recurrent episode, mild (H)        Collateral Reports Completed:   Not Applicable    Plan: (Homework, other):  Shadia was given information about behavioral services and encouraged to schedule a follow up appointment with the clinic [facility] in 1 month. She was given further adaptive strategies to manage irritability/anger difficulties in relationships. She was encouraged to schedule sooner if needed. CD Recommendations: No indications of CD issues.     Edmundo Hansen Psy.D, LP   Behavioral Health Clinician   [facility]     March 8, 2022          ______________________________________________________________________                                            Individual Treatment Plan    Patient's Name: Shadia Mcgrath  YOB: 1980    Date of Creation: 3/8/2022  Date Treatment Plan Last Reviewed/Revised:  3/8/2022    DSM5 Diagnoses: 296.31 (F33.0) Major Depressive Disorder, Recurrent Episode, Mild _  Psychosocial / Contextual Factors: Hx of difficulties with anger  PROMIS (reviewed every 90 days): not completed by pt    Referral / Collaboration:  Referral to another professional/service is not indicated at this time..    Anticipated number of session for this episode of care: 6-8  Anticipation frequency of session: Every other week  Anticipated Duration of each session: 38-52 minutes  Treatment plan will be reviewed in 90 days or when goals have been changed.       MeasurableTreatment Goal(s) related to diagnosis / functional impairment(s)  Goal 1: Patient will experience a reduction in depressive symptoms along with a corresponding increase in positive emotion and life satisfaction.    I will know I've  met my goal when I dont feel as down all the time.      Objective #A (Patient Action)    Patient will Increase interest, engagement, and pleasure in doing things.  Status: Continued - Date(s):  3/8/2022    Intervention(s)  Therapist will help patient identify pleasant and mastery oriented events that elicit positive, relaxed mood.    Objective #B  Patient will Decrease frequency and intensity of feeling down, depressed, hopeless.  Status: Continued - Date(s):  3/8/2022    Intervention(s)  Therapist will introduce patient to cognitive-behavioral and acceptance and commitment therapy topics aimed to help reduce depression and anxiety    Objective #C  Patient will Identify negative self-talk and behaviors: challenge core beliefs, myths, and actions  Improve concentration, focus, and mindfulness in daily activities .  Status: Continued - Date(s):  3/8/2022    Intervention(s)  Therapist will help patient identify and manage negative self-talk and automatic thoughts; introduce patient to cognitive distortions; help patient develop cognitive diffusion techniques      Goal 2: Patiient will learn adaptive ways to successfully manage conflict and difficulties with anger while also experiencing a corresponding improvement in relationship satisfaction    I will know I've met my goal when I don't get so angry.      Objective #A (Patient Action)    Status: Continued - Date(s):  3/8/2022    Patient will identify patterns of escalation  (i.e. tightness in chest, flushed face, increased heart rate, clenched hands, etc.).    Intervention(s)  Therapist will help patient identify physical and cognitive factors associated with anger to improve mindfulness.    Objective #B  Patient will use progressive relaxation exercise once in the morning and once in the evening to help relieve tension  practice deep diaphragm breathing once daily for at least 5 minutes to reduce anger / irritability and regain a calmer, more clear state of  mind.    Status: Continued - Date(s):  3/8/2022    Intervention(s)  Therapist will help patient identify breathing and relaxation techniques to help manage anger.    Objective #C  Patient will identify at least 2-3 alternative response(s) to aggressive behaviors  identify at least 2-3 techniques for intervening on the escalation.  Status: Continued - Date(s):  3/8/2022    Intervention(s)  Therapist will help patient identify alternative, adaptive responses to anger and aggressive behaviors toward others and ways to successfuly de-escalate inteprersonal situations where conflict occurs.    Other Possible Therapeutic Intervention(s):    Psycho-education regarding mental health diagnoses and treatment options    Supportive Therapy    Provide affirmations, reflections, and establish working rapport    Emphasize and reflect on strength of therapeutic relationship    Skills training    Explore skills useful to client in current situation.    Skills include assertiveness, communication, conflict management, problem-solving, relaxation, etc.    Solution-Focused Therapy    Explore patterns in patient's relationships and discuss options for new behaviors.    Explore patterns in patient's actions and choices and discuss options for new behaviors.    Cognitive-behavioral Therapy    Discuss common cognitive distortions, identify them in patient's life.    Explore ways to challenge, replace, and act against these cognitions.    Acceptance and Commitment Therapy    Explore and identify important values in patient's life.    Discuss ways to commit to behavioral activation around these values.    Psychodynamic psychotherapy    Discuss patient's emotional dynamics and issues and how they impact behaviors.    Explore patient's history of relationships and how they impact present behaviors.    Explore how to work with and make changes in these schemas and patterns.    Narrative Therapy    Explore the patient's story of his/her life  from his/her perspective.    Explore alternate ways of understanding their experience, identifying exceptions, developing new themes.    Interpersonal Psychotherapy    Explore patterns in relationships that are effective or ineffective at helping patient reach their goals, find satisfying experience.    Discuss new patterns or behaviors to engage in for improved social functioning.    Behavioral Activation    Discuss steps patient can take to become more involved in meaningful activity.    Identify barriers to these activities and explore possible solutions.    Mindfulness-Based Strategies    Discuss skills based on development and application of mindfulness.    Skills drawn from compassion-focused therapy, dialectical behavior therapy, mindfulness-based stress reduction, mindfulness-based cognitive therapy, etc.      Patient has reviewed and agreed to the above plan.      Edmundo Hansen Psy.D, LP   Behavioral Health Clinician   -[facility]     March 8, 2022

## 2022-03-11 DIAGNOSIS — J45.909 SEVERE ASTHMA, UNSPECIFIED WHETHER COMPLICATED, UNSPECIFIED WHETHER PERSISTENT: ICD-10-CM

## 2022-03-11 RX ORDER — AZITHROMYCIN 250 MG/1
250 TABLET, FILM COATED ORAL DAILY
Qty: 30 TABLET | Refills: 3 | Status: SHIPPED | OUTPATIENT
Start: 2022-03-11 | End: 2022-10-11

## 2022-03-28 ENCOUNTER — TELEPHONE (OUTPATIENT)
Dept: BEHAVIORAL HEALTH | Facility: CLINIC | Age: 42
End: 2022-03-28
Payer: COMMERCIAL

## 2022-03-28 NOTE — TELEPHONE ENCOUNTER
Writer left a vm for patient to call intake back to give verbal general consent, also sent a mychart.

## 2022-03-31 ENCOUNTER — VIRTUAL VISIT (OUTPATIENT)
Dept: BEHAVIORAL HEALTH | Facility: CLINIC | Age: 42
End: 2022-03-31
Payer: COMMERCIAL

## 2022-03-31 DIAGNOSIS — F33.0 MAJOR DEPRESSIVE DISORDER, RECURRENT EPISODE, MILD (H): Primary | ICD-10-CM

## 2022-03-31 PROCEDURE — 90837 PSYTX W PT 60 MINUTES: CPT | Mod: 95 | Performed by: PSYCHOLOGIST

## 2022-03-31 NOTE — PROGRESS NOTES
"[facility]: Integrated Behavioral Health   2022      Behavioral Health Clinician Progress Note    Patient Name: Shadia Mcgrath           Service Type: Phone Visit      Service Location:  Phone Visit      Session Start Time: 9:00  Session End Time: 9:59      Session Length: 53 - 60      Attendees: Patient     Visit Activities (Refresh list every visit): Phone Encounter    Service Modality:  Phone Visit:      Provider verified identity through the following two step process.  Patient provided:  Patient  and Patient is known previously to provider    The patient has been notified of the following:      \"We have found that certain health care needs can be provided without the need for a face to face visit.  This service lets us provide the care you need with a phone conversation.       I will have full access to your [facility] medical record during this entire phone call.   I will be taking notes for your medical record.      Since this is like an office visit, we will bill your insurance company for this service.       There are potential benefits and risks of telephone visits (e.g. limits to patient confidentiality) that differ from in-person visits.?Confidentiality still applies for telephone services, and nobody will record the visit.  It is important to be in a quiet, private space that is free of distractions (including cell phone or other devices) during the visit.??      If during the course of the call I believe a telephone visit is not appropriate, you will not be charged for this service\"     Consent has been obtained for this service by care team member: Yes     Diagnostic Assessment Date: 10/5/2020  Treatment Plan Review Date: 2022  See Flowsheets for today's PHQ-9 and ZACH-7 results  Previous PHQ-9:   PHQ-9 SCORE 2020   PHQ-9 Total Score 13     Previous ZACH-7: No flowsheet data found.       DATA  Extended Session (60+ minutes): " "No  Interactive Complexity: No  Crisis: No    Treatment Objective(s) Addressed in This Session:  Target Behavior(s): disease management/lifestyle changes related to psychosocial and relational stress    Depressed Mood: Increase interest, engagement, and pleasure in doing things  Decrease frequency and intensity of feeling down, depressed, hopeless  Anger Management: will learn strategies to resolve conflict adaptively and will learn and practice positive anger management skills        Current Stressors / Issues:  [facility] met with Shadia today over the phone to continue supporting her treatment of depression, reported difficulties with anger and relationship problems. We discussed the following topics today:    Reports doing well overall. She states feeling very positive about having her daughter's back in her life and is enjoying spending time with them. Reports some mild guilt about not being there for them before, though states it has been positive catching up and helping them out with things.     States concern about the behavior of her son. Reports her son can become upset when he doesn't get his way or something he wants. Imani notes that in a few instances, her son texted her upsetting things, such as \"I just want to die\" or \"I hate you\" when he doesn't get her way and she is worried about this. Discussed how her son says these things when he is upset and doesn't get his way (her son is 8) and how children say things like this when they are angry/upset about a situation. Imani notes her son does later apologize and show remorse for saying these things, which we agreed shows he doesn't mean them. Explored adaptive responses to these situations for Imani to try and explored how she is doing well with managing her anger.     Solution-focused therapies emphasized today.     Progress on Treatment Objective(s) / Homework:  Satisfactory progress - ACTION (Actively working towards change); Intervened by reinforcing " change plan / affirming steps taken    Care Plan review completed: No     Interpersonal Psychotherapy    Explore patterns in relationships that are effective or ineffective at helping patient reach their goals, find satisfying experience.    Discuss new patterns or behaviors to engage in for improved social functioning.    Skills training    Explore skills useful to client in current situation.    Skills include assertiveness, communication, conflict management, problem-solving, relaxation, etc.    Solution-Focused Therapy    Explore patterns in patient's relationships and discuss options for new behaviors.    Explore patterns in patient's actions and choices and discuss options for new behaviors.    Medication Review:  No changes to current psychiatric medication(s)    Medication Compliance:  Yes    Changes in Health Issues:   None reported    Chemical Use Review:   Substance Use: Chemical use reviewed, no active concerns identified      Tobacco Use: No current tobacco use.      Assessment: Current Emotional / Mental Status (status of significant symptoms):  Risk status (Self / Other harm or suicidal ideation)  Patient has had a history of suicidal ideation: SI as a youth only and suicide attempts: one prior attempt at age 15; none recent  Patient denies current fears or concerns for personal safety.     Patient denies current or recent suicidal ideation or behaviors.  Patient denies current or recent homicidal ideation or behaviors.  Patient denies current or recent self injurious behavior or ideation.  Patient denies other safety concerns.     A safety and risk management plan has not been developed at this time, however patient was encouraged to call [place] [phone] should there be a change in any of these risk factors.    [place] Suicide Severity Rating Scale (Lifetime/Recent)  [place] Suicide Severity Rating (Lifetime/Recent) 9/21/2020   Wish to be Dead (Lifetime) Yes   Non-Specific Active Suicidal  Thoughts (Lifetime) Yes   Most Severe Ideation Rating (Lifetime) 4   Frequency (Lifetime) 4   Duration (Lifetime) 4   Controllability (Lifetime) 3   Protective Factors  (Lifetime) 2   Reasons for Ideation (Lifetime) 4   RETIRED: 1. Wish to be Dead (Recent) No   RETIRED: 2. Non-Specific Active Suicidal Thoughts (Recent) No   3. Active Suicidal Ideation with any Methods (Not Plan) Without Intent to Act (Lifetime) Yes   RETIRED: 3. Active Suicidal Ideation with any Methods (Not Plan) Without Intent to Act (Recent) No   RETIRE: 4. Active Suicidal Ideation with Some Intent to Act, Without Specific Plan (Lifetime) Yes   4. Active Suicidal Ideation with Some Intent to Act, Without Specific Plan (Recent) No   RETIRE: 5. Active Suicidal Ideation with Specific Plan and Intent (Lifetime) Yes   RETIRED: 5. Active Suicidal Ideation with Specific Plan and Intent (Recent) No   Most Severe Ideation Rating (Past Month) NA   Frequency (Past Month) NA   Duration (Past Month) NA   Controllability (Past Month) NA   Protective Factors (Past Month) NA   Reasons for Ideation (Past Month) NA   Actual Attempt (Lifetime) Yes   Actual Attempt (Past 3 Months) No   Has subject engaged in non-suicidal self-injurious behavior? (Lifetime) No   Has subject engaged in non-suicidal self-injurious behavior? (Past 3 Months) No   Interrupted Attempts (Lifetime) No   Interrupted Attempts (Past 3 Months) No   Aborted or Self-Interrupted Attempt (Lifetime) No   Aborted or Self-Interrupted Attempt (Past 3 Months) No   Preparatory Acts or Behavior (Lifetime) No   Preparatory Acts or Behavior (Past 3 Months) No   Most Recent Attempt Actual Lethality Code 2   Most Lethal Attempt Actual Lethality Code 2   Initial/First Attempt Actual Lethality Code 2       Appearance:   Unable to assess   Eye Contact:   Unable to assess   Psychomotor Behavior: Unable to assess   Attitude:   Cooperative  Friendly Pleasant  Orientation:   All  Speech   Rate / Production: Normal     Volume:  Normal   Mood:    Angry  Depressed   Affect:    Appropriate   Thought Content:  Clear   Thought Form:  Coherent  Logical   Insight:    Good     Diagnoses:  1. Major depressive disorder, recurrent episode, mild (H)        Collateral Reports Completed:   Not Applicable    Plan: (Homework, other):  Shadia was given information about behavioral services and encouraged to schedule a follow up appointment with the clinic [facility] in 1 month. She was given further adaptive strategies to manage irritability/anger difficulties in relationships. She was encouraged to schedule sooner if needed. CD Recommendations: No indications of CD issues.     Edmundo Hansen Psy.D,    Behavioral Health Clinician   [facility]     March 31, 2022          ______________________________________________________________________                                            Individual Treatment Plan    Patient's Name: Shadia Mcgrath  YOB: 1980    Date of Creation: 3/8/2022  Date Treatment Plan Last Reviewed/Revised:  3/8/2022    DSM5 Diagnoses: 296.31 (F33.0) Major Depressive Disorder, Recurrent Episode, Mild _  Psychosocial / Contextual Factors: Hx of difficulties with anger  PROMIS (reviewed every 90 days): not completed by pt    Referral / Collaboration:  Referral to another professional/service is not indicated at this time..    Anticipated number of session for this episode of care: 6-8  Anticipation frequency of session: Every other week  Anticipated Duration of each session: 38-52 minutes  Treatment plan will be reviewed in 90 days or when goals have been changed.       MeasurableTreatment Goal(s) related to diagnosis / functional impairment(s)  Goal 1: Patient will experience a reduction in depressive symptoms along with a corresponding increase in positive emotion and life satisfaction.    I will know I've met my goal when I dont feel as down all the time.      Objective #A  (Patient Action)    Patient will Increase interest, engagement, and pleasure in doing things.  Status: Continued - Date(s):  3/8/2022    Intervention(s)  Therapist will help patient identify pleasant and mastery oriented events that elicit positive, relaxed mood.    Objective #B  Patient will Decrease frequency and intensity of feeling down, depressed, hopeless.  Status: Continued - Date(s):  3/8/2022    Intervention(s)  Therapist will introduce patient to cognitive-behavioral and acceptance and commitment therapy topics aimed to help reduce depression and anxiety    Objective #C  Patient will Identify negative self-talk and behaviors: challenge core beliefs, myths, and actions  Improve concentration, focus, and mindfulness in daily activities .  Status: Continued - Date(s):  3/8/2022    Intervention(s)  Therapist will help patient identify and manage negative self-talk and automatic thoughts; introduce patient to cognitive distortions; help patient develop cognitive diffusion techniques      Goal 2: Patiient will learn adaptive ways to successfully manage conflict and difficulties with anger while also experiencing a corresponding improvement in relationship satisfaction    I will know I've met my goal when I don't get so angry.      Objective #A (Patient Action)    Status: Continued - Date(s):  3/8/2022    Patient will identify patterns of escalation  (i.e. tightness in chest, flushed face, increased heart rate, clenched hands, etc.).    Intervention(s)  Therapist will help patient identify physical and cognitive factors associated with anger to improve mindfulness.    Objective #B  Patient will use progressive relaxation exercise once in the morning and once in the evening to help relieve tension  practice deep diaphragm breathing once daily for at least 5 minutes to reduce anger / irritability and regain a calmer, more clear state of mind.    Status: Continued - Date(s):  3/8/2022    Intervention(s)  Therapist  will help patient identify breathing and relaxation techniques to help manage anger.    Objective #C  Patient will identify at least 2-3 alternative response(s) to aggressive behaviors  identify at least 2-3 techniques for intervening on the escalation.  Status: Continued - Date(s):  3/8/2022    Intervention(s)  Therapist will help patient identify alternative, adaptive responses to anger and aggressive behaviors toward others and ways to successfuly de-escalate inteprersonal situations where conflict occurs.    Other Possible Therapeutic Intervention(s):    Psycho-education regarding mental health diagnoses and treatment options    Supportive Therapy    Provide affirmations, reflections, and establish working rapport    Emphasize and reflect on strength of therapeutic relationship    Skills training    Explore skills useful to client in current situation.    Skills include assertiveness, communication, conflict management, problem-solving, relaxation, etc.    Solution-Focused Therapy    Explore patterns in patient's relationships and discuss options for new behaviors.    Explore patterns in patient's actions and choices and discuss options for new behaviors.    Cognitive-behavioral Therapy    Discuss common cognitive distortions, identify them in patient's life.    Explore ways to challenge, replace, and act against these cognitions.    Acceptance and Commitment Therapy    Explore and identify important values in patient's life.    Discuss ways to commit to behavioral activation around these values.    Psychodynamic psychotherapy    Discuss patient's emotional dynamics and issues and how they impact behaviors.    Explore patient's history of relationships and how they impact present behaviors.    Explore how to work with and make changes in these schemas and patterns.    Narrative Therapy    Explore the patient's story of his/her life from his/her perspective.    Explore alternate ways of understanding their  experience, identifying exceptions, developing new themes.    Interpersonal Psychotherapy    Explore patterns in relationships that are effective or ineffective at helping patient reach their goals, find satisfying experience.    Discuss new patterns or behaviors to engage in for improved social functioning.    Behavioral Activation    Discuss steps patient can take to become more involved in meaningful activity.    Identify barriers to these activities and explore possible solutions.    Mindfulness-Based Strategies    Discuss skills based on development and application of mindfulness.    Skills drawn from compassion-focused therapy, dialectical behavior therapy, mindfulness-based stress reduction, mindfulness-based cognitive therapy, etc.      Patient has reviewed and agreed to the above plan.      Edmundo Hansen Psy.D, LP   Behavioral Health Clinician   -[facility]     March 8, 2022

## 2022-04-19 ENCOUNTER — VIRTUAL VISIT (OUTPATIENT)
Dept: BEHAVIORAL HEALTH | Facility: CLINIC | Age: 42
End: 2022-04-19
Payer: COMMERCIAL

## 2022-04-19 DIAGNOSIS — F33.0 MAJOR DEPRESSIVE DISORDER, RECURRENT EPISODE, MILD (H): Primary | ICD-10-CM

## 2022-04-19 PROCEDURE — 90834 PSYTX W PT 45 MINUTES: CPT | Mod: 95 | Performed by: PSYCHOLOGIST

## 2022-04-19 NOTE — PROGRESS NOTES
"[facility]: Integrated Behavioral Health   2022      Behavioral Health Clinician Progress Note    Patient Name: Shadia Mcgrath           Service Type: Phone Visit      Service Location:  Phone Visit      Session Start Time: 9:08  Session End Time: 9:59      Session Length: 38 - 52      Attendees: Patient     Visit Activities (Refresh list every visit): Phone Encounter    Service Modality:  Phone Visit:      Provider verified identity through the following two step process.  Patient provided:  Patient  and Patient is known previously to provider    The patient has been notified of the following:      \"We have found that certain health care needs can be provided without the need for a face to face visit.  This service lets us provide the care you need with a phone conversation.       I will have full access to your [facility] medical record during this entire phone call.   I will be taking notes for your medical record.      Since this is like an office visit, we will bill your insurance company for this service.       There are potential benefits and risks of telephone visits (e.g. limits to patient confidentiality) that differ from in-person visits.?Confidentiality still applies for telephone services, and nobody will record the visit.  It is important to be in a quiet, private space that is free of distractions (including cell phone or other devices) during the visit.??      If during the course of the call I believe a telephone visit is not appropriate, you will not be charged for this service\"     Consent has been obtained for this service by care team member: Yes     Diagnostic Assessment Date: 10/5/2020  Treatment Plan Review Date: 2022  See Flowsheets for today's PHQ-9 and ZACH-7 results  Previous PHQ-9:   PHQ-9 SCORE 2020   PHQ-9 Total Score 13     Previous ZACH-7: No flowsheet data found.       DATA  Extended Session (60+ minutes): " No  Interactive Complexity: No  Crisis: No    Treatment Objective(s) Addressed in This Session:  Target Behavior(s): disease management/lifestyle changes related to psychosocial and relational stress    Depressed Mood: Increase interest, engagement, and pleasure in doing things  Decrease frequency and intensity of feeling down, depressed, hopeless  Identify negative self-talk and behaviors: challenge core beliefs, myths, and actions  Relationship Problems: will address relationship difficulties in a more adaptive manner       Current Stressors / Issues:  [facility] met with Shadia today over the phone to continue supporting her treatment of depression, reported difficulties with anger and relationship problems. We discussed the following topics today:    Reports continuing to feel positive about having her daughters back in her life and spending time with them. Reports feeling more optimistic and asia as a result. She denies concerns with anger, noting this is getting better and she is managing irritability more effectively. States worrying though that with her promotion at work and her daughters in her life, she is not spending as much quality time with her son, who is eleven. She reports observing some changes in his behavior that are concerning to her. She inquires today if there are any ideas we could explore about ways to adaptively talk to her son about her observations.     Discussed this point in detail; we spent time reframing some of her son's behavior as possibly bids for support and we talked about ways she could communicate effectively with him. Also discussed how making concerted efforts to spend time with him could be helpful. Discussed scheduling quality time with him, with just the two of them, doing something they each enjoy.     Solution-focused therapy emphasized today.     Progress on Treatment Objective(s) / Homework:  Satisfactory progress - ACTION (Actively working towards change); Intervened by  reinforcing change plan / affirming steps taken    Care Plan review completed: No     Interpersonal Psychotherapy    Explore patterns in relationships that are effective or ineffective at helping patient reach their goals, find satisfying experience.    Discuss new patterns or behaviors to engage in for improved social functioning.    Skills training    Explore skills useful to client in current situation.    Skills include assertiveness, communication, conflict management, problem-solving, relaxation, etc.    Solution-Focused Therapy    Explore patterns in patient's relationships and discuss options for new behaviors.    Explore patterns in patient's actions and choices and discuss options for new behaviors.    Medication Review:  No changes to current psychiatric medication(s)    Medication Compliance:  Yes    Changes in Health Issues:   None reported    Chemical Use Review:   Substance Use: Chemical use reviewed, no active concerns identified      Tobacco Use: No current tobacco use.      Assessment: Current Emotional / Mental Status (status of significant symptoms):  Risk status (Self / Other harm or suicidal ideation)  Patient has had a history of suicidal ideation: SI as a youth only and suicide attempts: one prior attempt at age 15; none recent  Patient denies current fears or concerns for personal safety.     Patient denies current or recent suicidal ideation or behaviors.  Patient denies current or recent homicidal ideation or behaviors.  Patient denies current or recent self injurious behavior or ideation.  Patient denies other safety concerns.     A safety and risk management plan has not been developed at this time, however patient was encouraged to call [place] [phone] should there be a change in any of these risk factors.    [place] Suicide Severity Rating Scale (Lifetime/Recent)  [place] Suicide Severity Rating (Lifetime/Recent) 9/21/2020   Wish to be Dead (Lifetime) Yes   Non-Specific Active  Suicidal Thoughts (Lifetime) Yes   Most Severe Ideation Rating (Lifetime) 4   Frequency (Lifetime) 4   Duration (Lifetime) 4   Controllability (Lifetime) 3   Protective Factors  (Lifetime) 2   Reasons for Ideation (Lifetime) 4   RETIRED: 1. Wish to be Dead (Recent) No   RETIRED: 2. Non-Specific Active Suicidal Thoughts (Recent) No   3. Active Suicidal Ideation with any Methods (Not Plan) Without Intent to Act (Lifetime) Yes   RETIRED: 3. Active Suicidal Ideation with any Methods (Not Plan) Without Intent to Act (Recent) No   RETIRE: 4. Active Suicidal Ideation with Some Intent to Act, Without Specific Plan (Lifetime) Yes   4. Active Suicidal Ideation with Some Intent to Act, Without Specific Plan (Recent) No   RETIRE: 5. Active Suicidal Ideation with Specific Plan and Intent (Lifetime) Yes   RETIRED: 5. Active Suicidal Ideation with Specific Plan and Intent (Recent) No   Most Severe Ideation Rating (Past Month) NA   Frequency (Past Month) NA   Duration (Past Month) NA   Controllability (Past Month) NA   Protective Factors (Past Month) NA   Reasons for Ideation (Past Month) NA   Actual Attempt (Lifetime) Yes   Actual Attempt (Past 3 Months) No   Has subject engaged in non-suicidal self-injurious behavior? (Lifetime) No   Has subject engaged in non-suicidal self-injurious behavior? (Past 3 Months) No   Interrupted Attempts (Lifetime) No   Interrupted Attempts (Past 3 Months) No   Aborted or Self-Interrupted Attempt (Lifetime) No   Aborted or Self-Interrupted Attempt (Past 3 Months) No   Preparatory Acts or Behavior (Lifetime) No   Preparatory Acts or Behavior (Past 3 Months) No   Most Recent Attempt Actual Lethality Code 2   Most Lethal Attempt Actual Lethality Code 2   Initial/First Attempt Actual Lethality Code 2       Appearance:   Unable to assess   Eye Contact:   Unable to assess   Psychomotor Behavior: Unable to assess   Attitude:   Cooperative  Friendly Pleasant  Orientation:   All  Speech   Rate /  Production: Normal    Volume:  Normal   Mood:    Angry  Depressed   Affect:    Appropriate   Thought Content:  Clear   Thought Form:  Coherent  Logical   Insight:    Good     Diagnoses:  1. Major depressive disorder, recurrent episode, mild (H)        Collateral Reports Completed:   Not Applicable    Plan: (Homework, other):  Shadia was given information about behavioral services and encouraged to schedule a follow up appointment with the clinic [facility] in 1 month. She was given further adaptive strategies to manage irritability/anger difficulties in relationships. She was encouraged to schedule sooner if needed. CD Recommendations: No indications of CD issues.     Edmundo Hansen Psy.D, LP   Behavioral Health Clinician   [facility]     April 19, 2022            ______________________________________________________________________                                            Individual Treatment Plan    Patient's Name: Shadia Mcgrath  YOB: 1980    Date of Creation: 3/8/2022  Date Treatment Plan Last Reviewed/Revised:  3/8/2022    DSM5 Diagnoses: 296.31 (F33.0) Major Depressive Disorder, Recurrent Episode, Mild _  Psychosocial / Contextual Factors: Hx of difficulties with anger  PROMIS (reviewed every 90 days): not completed by pt    Referral / Collaboration:  Referral to another professional/service is not indicated at this time..    Anticipated number of session for this episode of care: 6-8  Anticipation frequency of session: Every other week  Anticipated Duration of each session: 38-52 minutes  Treatment plan will be reviewed in 90 days or when goals have been changed.       MeasurableTreatment Goal(s) related to diagnosis / functional impairment(s)  Goal 1: Patient will experience a reduction in depressive symptoms along with a corresponding increase in positive emotion and life satisfaction.    I will know I've met my goal when I dont feel as down all the time.       Objective #A (Patient Action)    Patient will Increase interest, engagement, and pleasure in doing things.  Status: Continued - Date(s):  3/8/2022    Intervention(s)  Therapist will help patient identify pleasant and mastery oriented events that elicit positive, relaxed mood.    Objective #B  Patient will Decrease frequency and intensity of feeling down, depressed, hopeless.  Status: Continued - Date(s):  3/8/2022    Intervention(s)  Therapist will introduce patient to cognitive-behavioral and acceptance and commitment therapy topics aimed to help reduce depression and anxiety    Objective #C  Patient will Identify negative self-talk and behaviors: challenge core beliefs, myths, and actions  Improve concentration, focus, and mindfulness in daily activities .  Status: Continued - Date(s):  3/8/2022    Intervention(s)  Therapist will help patient identify and manage negative self-talk and automatic thoughts; introduce patient to cognitive distortions; help patient develop cognitive diffusion techniques      Goal 2: Patiient will learn adaptive ways to successfully manage conflict and difficulties with anger while also experiencing a corresponding improvement in relationship satisfaction    I will know I've met my goal when I don't get so angry.      Objective #A (Patient Action)    Status: Continued - Date(s):  3/8/2022    Patient will identify patterns of escalation  (i.e. tightness in chest, flushed face, increased heart rate, clenched hands, etc.).    Intervention(s)  Therapist will help patient identify physical and cognitive factors associated with anger to improve mindfulness.    Objective #B  Patient will use progressive relaxation exercise once in the morning and once in the evening to help relieve tension  practice deep diaphragm breathing once daily for at least 5 minutes to reduce anger / irritability and regain a calmer, more clear state of mind.    Status: Continued - Date(s):   3/8/2022    Intervention(s)  Therapist will help patient identify breathing and relaxation techniques to help manage anger.    Objective #C  Patient will identify at least 2-3 alternative response(s) to aggressive behaviors  identify at least 2-3 techniques for intervening on the escalation.  Status: Continued - Date(s):  3/8/2022    Intervention(s)  Therapist will help patient identify alternative, adaptive responses to anger and aggressive behaviors toward others and ways to successfuly de-escalate inteprersonal situations where conflict occurs.    Other Possible Therapeutic Intervention(s):    Psycho-education regarding mental health diagnoses and treatment options    Supportive Therapy    Provide affirmations, reflections, and establish working rapport    Emphasize and reflect on strength of therapeutic relationship    Skills training    Explore skills useful to client in current situation.    Skills include assertiveness, communication, conflict management, problem-solving, relaxation, etc.    Solution-Focused Therapy    Explore patterns in patient's relationships and discuss options for new behaviors.    Explore patterns in patient's actions and choices and discuss options for new behaviors.    Cognitive-behavioral Therapy    Discuss common cognitive distortions, identify them in patient's life.    Explore ways to challenge, replace, and act against these cognitions.    Acceptance and Commitment Therapy    Explore and identify important values in patient's life.    Discuss ways to commit to behavioral activation around these values.    Psychodynamic psychotherapy    Discuss patient's emotional dynamics and issues and how they impact behaviors.    Explore patient's history of relationships and how they impact present behaviors.    Explore how to work with and make changes in these schemas and patterns.    Narrative Therapy    Explore the patient's story of his/her life from his/her perspective.    Explore  alternate ways of understanding their experience, identifying exceptions, developing new themes.    Interpersonal Psychotherapy    Explore patterns in relationships that are effective or ineffective at helping patient reach their goals, find satisfying experience.    Discuss new patterns or behaviors to engage in for improved social functioning.    Behavioral Activation    Discuss steps patient can take to become more involved in meaningful activity.    Identify barriers to these activities and explore possible solutions.    Mindfulness-Based Strategies    Discuss skills based on development and application of mindfulness.    Skills drawn from compassion-focused therapy, dialectical behavior therapy, mindfulness-based stress reduction, mindfulness-based cognitive therapy, etc.      Patient has reviewed and agreed to the above plan.      Edmundo Hansen Psy.D, LP   Behavioral Health Clinician   -[facility]     March 8, 2022

## 2022-05-10 ENCOUNTER — VIRTUAL VISIT (OUTPATIENT)
Dept: BEHAVIORAL HEALTH | Facility: CLINIC | Age: 42
End: 2022-05-10
Payer: COMMERCIAL

## 2022-05-10 DIAGNOSIS — Z53.9 ERRONEOUS ENCOUNTER--DISREGARD: Primary | ICD-10-CM

## 2022-05-10 NOTE — PROGRESS NOTES
[facility] called patient for appointment today at 9:00. Patient requested appointment be rescheduled. [facility] rescheduled patient for 5/13/2022 at 11:00.     Edmundo Hanesn Psy.D, LP   Behavioral Health Clinician   -[facility] [place]     May 10, 2022

## 2022-05-13 ENCOUNTER — VIRTUAL VISIT (OUTPATIENT)
Dept: BEHAVIORAL HEALTH | Facility: CLINIC | Age: 42
End: 2022-05-13
Payer: COMMERCIAL

## 2022-05-13 DIAGNOSIS — F33.0 MAJOR DEPRESSIVE DISORDER, RECURRENT EPISODE, MILD (H): Primary | ICD-10-CM

## 2022-05-13 PROCEDURE — 90832 PSYTX W PT 30 MINUTES: CPT | Mod: 95 | Performed by: PSYCHOLOGIST

## 2022-05-16 NOTE — PROGRESS NOTES
"[facility]: Integrated Behavioral Health   May 16, 2022      Behavioral Health Clinician Progress Note    Patient Name: Shadia Mcgrath           Service Type: Phone Visit      Service Location:  Phone Visit      Session Start Time: 11:05  Session End Time: 11:40      Session Length: 16 - 37      Attendees: Patient     Visit Activities (Refresh list every visit): Phone Encounter    Service Modality:  Phone Visit:      Provider verified identity through the following two step process.  Patient provided:  Patient  and Patient is known previously to provider    The patient has been notified of the following:      \"We have found that certain health care needs can be provided without the need for a face to face visit.  This service lets us provide the care you need with a phone conversation.       I will have full access to your [facility] medical record during this entire phone call.   I will be taking notes for your medical record.      Since this is like an office visit, we will bill your insurance company for this service.       There are potential benefits and risks of telephone visits (e.g. limits to patient confidentiality) that differ from in-person visits.?Confidentiality still applies for telephone services, and nobody will record the visit.  It is important to be in a quiet, private space that is free of distractions (including cell phone or other devices) during the visit.??      If during the course of the call I believe a telephone visit is not appropriate, you will not be charged for this service\"     Consent has been obtained for this service by care team member: Yes     Diagnostic Assessment Date: 10/5/2020  Treatment Plan Review Date: 2022  See Flowsheets for today's PHQ-9 and ZACH-7 results  Previous PHQ-9:   PHQ-9 SCORE 2020   PHQ-9 Total Score 13     Previous ZACH-7: No flowsheet data found.       DATA  Extended Session (60+ minutes): " No  Interactive Complexity: No  Crisis: No    Treatment Objective(s) Addressed in This Session:  Target Behavior(s): disease management/lifestyle changes related to psychosocial and relational stress    Depressed Mood: Increase interest, engagement, and pleasure in doing things  Decrease frequency and intensity of feeling down, depressed, hopeless  Identify negative self-talk and behaviors: challenge core beliefs, myths, and actions  Relationship Problems: will address relationship difficulties in a more adaptive manner       Current Stressors / Issues:  [facility] met with Shadia today over the phone to continue supporting her treatment of depression, reported difficulties with anger and relationship problems. We discussed the following topics today:    Reports stress from conflict with her work. States she is being asked to move out of her current living situation and she plans to go live with a friend temporarily, until she finds a new place. States she has an upcoming 1:1 meeting with her supervisor and we talked about adaptive ways of managing anger and communicating during this. Discouraged Imani from sharing with her employer about her thoughts of looking for employment elsewhere and rather encouraged her to focus on on the current problem during the 1:1 session. Also explored the ambivalence she has about finding new work, as we agreed her current job means a lot to her. Helped Imani practice adaptive communication skills during our session today and provided feedback as appropriate.     Supportive and solution-focused therapy emphasized today.     Progress on Treatment Objective(s) / Homework:  Satisfactory progress - ACTION (Actively working towards change); Intervened by reinforcing change plan / affirming steps taken    Care Plan review completed: No     Interpersonal Psychotherapy    Explore patterns in relationships that are effective or ineffective at helping patient reach their goals, find satisfying  experience.    Discuss new patterns or behaviors to engage in for improved social functioning.    Skills training    Explore skills useful to client in current situation.    Skills include assertiveness, communication, conflict management, problem-solving, relaxation, etc.    Solution-Focused Therapy    Explore patterns in patient's relationships and discuss options for new behaviors.    Explore patterns in patient's actions and choices and discuss options for new behaviors.    Medication Review:  No changes to current psychiatric medication(s)    Medication Compliance:  Yes    Changes in Health Issues:   None reported    Chemical Use Review:   Substance Use: Chemical use reviewed, no active concerns identified      Tobacco Use: No current tobacco use.      Assessment: Current Emotional / Mental Status (status of significant symptoms):  Risk status (Self / Other harm or suicidal ideation)  Patient has had a history of suicidal ideation: SI as a youth only and suicide attempts: one prior attempt at age 15; none recent  Patient denies current fears or concerns for personal safety.     Patient denies current or recent suicidal ideation or behaviors.  Patient denies current or recent homicidal ideation or behaviors.  Patient denies current or recent self injurious behavior or ideation.  Patient denies other safety concerns.     A safety and risk management plan has not been developed at this time, however patient was encouraged to call [place] [phone] should there be a change in any of these risk factors.    Roosevelt Suicide Severity Rating Scale (Lifetime/Recent)  [place] Suicide Severity Rating (Lifetime/Recent) 9/21/2020   Wish to be Dead (Lifetime) Yes   Non-Specific Active Suicidal Thoughts (Lifetime) Yes   Most Severe Ideation Rating (Lifetime) 4   Frequency (Lifetime) 4   Duration (Lifetime) 4   Controllability (Lifetime) 3   Protective Factors  (Lifetime) 2   Reasons for Ideation (Lifetime) 4   RETIRED: 1.  Wish to be Dead (Recent) No   RETIRED: 2. Non-Specific Active Suicidal Thoughts (Recent) No   3. Active Suicidal Ideation with any Methods (Not Plan) Without Intent to Act (Lifetime) Yes   RETIRED: 3. Active Suicidal Ideation with any Methods (Not Plan) Without Intent to Act (Recent) No   RETIRE: 4. Active Suicidal Ideation with Some Intent to Act, Without Specific Plan (Lifetime) Yes   4. Active Suicidal Ideation with Some Intent to Act, Without Specific Plan (Recent) No   RETIRE: 5. Active Suicidal Ideation with Specific Plan and Intent (Lifetime) Yes   RETIRED: 5. Active Suicidal Ideation with Specific Plan and Intent (Recent) No   Most Severe Ideation Rating (Past Month) NA   Frequency (Past Month) NA   Duration (Past Month) NA   Controllability (Past Month) NA   Protective Factors (Past Month) NA   Reasons for Ideation (Past Month) NA   Actual Attempt (Lifetime) Yes   Actual Attempt (Past 3 Months) No   Has subject engaged in non-suicidal self-injurious behavior? (Lifetime) No   Has subject engaged in non-suicidal self-injurious behavior? (Past 3 Months) No   Interrupted Attempts (Lifetime) No   Interrupted Attempts (Past 3 Months) No   Aborted or Self-Interrupted Attempt (Lifetime) No   Aborted or Self-Interrupted Attempt (Past 3 Months) No   Preparatory Acts or Behavior (Lifetime) No   Preparatory Acts or Behavior (Past 3 Months) No   Most Recent Attempt Actual Lethality Code 2   Most Lethal Attempt Actual Lethality Code 2   Initial/First Attempt Actual Lethality Code 2       Appearance:   Unable to assess   Eye Contact:   Unable to assess   Psychomotor Behavior: Unable to assess   Attitude:   Cooperative  Friendly Pleasant  Orientation:   All  Speech   Rate / Production: Normal    Volume:  Normal   Mood:    Angry  Depressed   Affect:    Appropriate   Thought Content:  Clear   Thought Form:  Coherent  Logical   Insight:    Good     Diagnoses:  1. Major depressive disorder, recurrent episode, mild (H)         Collateral Reports Completed:   Not Applicable    Plan: (Homework, other):  Shadia was given information about behavioral services and encouraged to schedule a follow up appointment with the clinic [facility] in 1 month. She was given further adaptive strategies to manage irritability/anger difficulties in relationships. She was encouraged to schedule sooner if needed. CD Recommendations: No indications of CD issues.     Edmundo Hansen Psy.D, MARJAN   Behavioral Health Clinician   [facility]     May 16, 2022    ______________________________________________________________________                                            Individual Treatment Plan    Patient's Name: Shadia Mcgrath  YOB: 1980    Date of Creation: 3/8/2022  Date Treatment Plan Last Reviewed/Revised:  3/8/2022    DSM5 Diagnoses: 296.31 (F33.0) Major Depressive Disorder, Recurrent Episode, Mild _  Psychosocial / Contextual Factors: Hx of difficulties with anger  PROMIS (reviewed every 90 days): not completed by pt    Referral / Collaboration:  Referral to another professional/service is not indicated at this time..    Anticipated number of session for this episode of care: 6-8  Anticipation frequency of session: Every other week  Anticipated Duration of each session: 38-52 minutes  Treatment plan will be reviewed in 90 days or when goals have been changed.       MeasurableTreatment Goal(s) related to diagnosis / functional impairment(s)  Goal 1: Patient will experience a reduction in depressive symptoms along with a corresponding increase in positive emotion and life satisfaction.    I will know I've met my goal when I dont feel as down all the time.      Objective #A (Patient Action)    Patient will Increase interest, engagement, and pleasure in doing things.  Status: Continued - Date(s):  3/8/2022    Intervention(s)  Therapist will help patient identify pleasant and mastery oriented events that elicit  positive, relaxed mood.    Objective #B  Patient will Decrease frequency and intensity of feeling down, depressed, hopeless.  Status: Continued - Date(s):  3/8/2022    Intervention(s)  Therapist will introduce patient to cognitive-behavioral and acceptance and commitment therapy topics aimed to help reduce depression and anxiety    Objective #C  Patient will Identify negative self-talk and behaviors: challenge core beliefs, myths, and actions  Improve concentration, focus, and mindfulness in daily activities .  Status: Continued - Date(s):  3/8/2022    Intervention(s)  Therapist will help patient identify and manage negative self-talk and automatic thoughts; introduce patient to cognitive distortions; help patient develop cognitive diffusion techniques      Goal 2: Patiient will learn adaptive ways to successfully manage conflict and difficulties with anger while also experiencing a corresponding improvement in relationship satisfaction    I will know I've met my goal when I don't get so angry.      Objective #A (Patient Action)    Status: Continued - Date(s):  3/8/2022    Patient will identify patterns of escalation  (i.e. tightness in chest, flushed face, increased heart rate, clenched hands, etc.).    Intervention(s)  Therapist will help patient identify physical and cognitive factors associated with anger to improve mindfulness.    Objective #B  Patient will use progressive relaxation exercise once in the morning and once in the evening to help relieve tension  practice deep diaphragm breathing once daily for at least 5 minutes to reduce anger / irritability and regain a calmer, more clear state of mind.    Status: Continued - Date(s):  3/8/2022    Intervention(s)  Therapist will help patient identify breathing and relaxation techniques to help manage anger.    Objective #C  Patient will identify at least 2-3 alternative response(s) to aggressive behaviors  identify at least 2-3 techniques for intervening on the  escalation.  Status: Continued - Date(s):  3/8/2022    Intervention(s)  Therapist will help patient identify alternative, adaptive responses to anger and aggressive behaviors toward others and ways to successfuly de-escalate inteprersonal situations where conflict occurs.    Other Possible Therapeutic Intervention(s):    Psycho-education regarding mental health diagnoses and treatment options    Supportive Therapy    Provide affirmations, reflections, and establish working rapport    Emphasize and reflect on strength of therapeutic relationship    Skills training    Explore skills useful to client in current situation.    Skills include assertiveness, communication, conflict management, problem-solving, relaxation, etc.    Solution-Focused Therapy    Explore patterns in patient's relationships and discuss options for new behaviors.    Explore patterns in patient's actions and choices and discuss options for new behaviors.    Cognitive-behavioral Therapy    Discuss common cognitive distortions, identify them in patient's life.    Explore ways to challenge, replace, and act against these cognitions.    Acceptance and Commitment Therapy    Explore and identify important values in patient's life.    Discuss ways to commit to behavioral activation around these values.    Psychodynamic psychotherapy    Discuss patient's emotional dynamics and issues and how they impact behaviors.    Explore patient's history of relationships and how they impact present behaviors.    Explore how to work with and make changes in these schemas and patterns.    Narrative Therapy    Explore the patient's story of his/her life from his/her perspective.    Explore alternate ways of understanding their experience, identifying exceptions, developing new themes.    Interpersonal Psychotherapy    Explore patterns in relationships that are effective or ineffective at helping patient reach their goals, find satisfying experience.    Discuss new  patterns or behaviors to engage in for improved social functioning.    Behavioral Activation    Discuss steps patient can take to become more involved in meaningful activity.    Identify barriers to these activities and explore possible solutions.    Mindfulness-Based Strategies    Discuss skills based on development and application of mindfulness.    Skills drawn from compassion-focused therapy, dialectical behavior therapy, mindfulness-based stress reduction, mindfulness-based cognitive therapy, etc.      Patient has reviewed and agreed to the above plan.      Angelo Quispe.DONNELL, LP   Behavioral Health Clinician   -[facility]     March 8, 2022

## 2022-06-01 ENCOUNTER — VIRTUAL VISIT (OUTPATIENT)
Dept: BEHAVIORAL HEALTH | Facility: CLINIC | Age: 42
End: 2022-06-01
Payer: COMMERCIAL

## 2022-06-01 DIAGNOSIS — Z53.9 ERRONEOUS ENCOUNTER--DISREGARD: Primary | ICD-10-CM

## 2022-06-01 NOTE — PROGRESS NOTES
Appointment cancelled by patient when called. Patient requested reschedule. R/S for 10am, 6/6/2022.     Edmundo Hansen Psy.D, LP   Behavioral Health Clinician   -[facility]     June 1, 2022

## 2022-06-06 ENCOUNTER — VIRTUAL VISIT (OUTPATIENT)
Dept: BEHAVIORAL HEALTH | Facility: CLINIC | Age: 42
End: 2022-06-06
Payer: COMMERCIAL

## 2022-06-06 DIAGNOSIS — F33.0 MAJOR DEPRESSIVE DISORDER, RECURRENT EPISODE, MILD (H): Primary | ICD-10-CM

## 2022-06-06 PROCEDURE — 90832 PSYTX W PT 30 MINUTES: CPT | Mod: 95 | Performed by: PSYCHOLOGIST

## 2022-06-06 NOTE — PROGRESS NOTES
"[facility]: Integrated Behavioral Health   2022      Behavioral Health Clinician Progress Note    Patient Name: Shadia Mcgrath           Service Type: Phone Visit      Service Location:  Phone Visit      Session Start Time: 1:02  Session End Time: 1:18      Session Length: 16 - 37      Attendees: Patient     Visit Activities (Refresh list every visit): Phone Encounter    Service Modality:  Phone Visit:      Provider verified identity through the following two step process.  Patient provided:  Patient  and Patient is known previously to provider    The patient has been notified of the following:      \"We have found that certain health care needs can be provided without the need for a face to face visit.  This service lets us provide the care you need with a phone conversation.       I will have full access to your [facility] medical record during this entire phone call.   I will be taking notes for your medical record.      Since this is like an office visit, we will bill your insurance company for this service.       There are potential benefits and risks of telephone visits (e.g. limits to patient confidentiality) that differ from in-person visits.?Confidentiality still applies for telephone services, and nobody will record the visit.  It is important to be in a quiet, private space that is free of distractions (including cell phone or other devices) during the visit.??      If during the course of the call I believe a telephone visit is not appropriate, you will not be charged for this service\"     Consent has been obtained for this service by care team member: Yes     Diagnostic Assessment Date: 10/5/2020  Treatment Plan Review Date: 2022  See Flowsheets for today's PHQ-9 and ZACH-7 results  Previous PHQ-9:   PHQ-9 SCORE 2020   PHQ-9 Total Score 13     Previous ZACH-7: No flowsheet data found.       DATA  Extended Session (60+ minutes): " "No  Interactive Complexity: No  Crisis: No    Treatment Objective(s) Addressed in This Session:  Target Behavior(s): disease management/lifestyle changes related to psychosocial and relational stress    Depressed Mood: Increase interest, engagement, and pleasure in doing things  Decrease frequency and intensity of feeling down, depressed, hopeless  Identify negative self-talk and behaviors: challenge core beliefs, myths, and actions  Relationship Problems: will address relationship difficulties in a more adaptive manner       Current Stressors / Issues:  [facility] [place] met with Shadia today over the phone to continue supporting her treatment of depression, reported difficulties with anger and relationship problems. We discussed the following topics today:    Reports some \"highs and lows\" over the last two weeks. She reports starting a new job at [employer], a place she has worked before. Notes some stress from her previous job, where she is going to work now just one day during the week. As we talked, this writer observed Imani's speech to be markedly slowed and occasionally slurred. She also exhibited difficulty staying focused. When this was shared with her, she indicated recently taking her pain medication (she states sustaining a injury recently) and notes she has been in more acute pain lately, which makes her lethargic, drowsy. Provided Imani support and we agreed to have a shorter session today, to review her presenting concerns and to  on them in a week.   Supportive and solution-focused therapy emphasized today.     Progress on Treatment Objective(s) / Homework:  Satisfactory progress - ACTION (Actively working towards change); Intervened by reinforcing change plan / affirming steps taken    Care Plan review completed: Yes       Medication Review:  No changes to current psychiatric medication(s)    Medication Compliance:  Yes    Changes in Health Issues:   None reported    Chemical Use " Review:   Substance Use: Chemical use reviewed, no active concerns identified      Tobacco Use: No current tobacco use.      Assessment: Current Emotional / Mental Status (status of significant symptoms):  Risk status (Self / Other harm or suicidal ideation)  Patient has had a history of suicidal ideation: SI as a youth only and suicide attempts: one prior attempt at age 15; none recent  Patient denies current fears or concerns for personal safety.     Patient denies current or recent suicidal ideation or behaviors.  Patient denies current or recent homicidal ideation or behaviors.  Patient denies current or recent self injurious behavior or ideation.  Patient denies other safety concerns.     A safety and risk management plan has not been developed at this time, however patient was encouraged to call [place] [phone] should there be a change in any of these risk factors.    [place] Suicide Severity Rating Scale (Lifetime/Recent)  [place] Suicide Severity Rating (Lifetime/Recent) 9/21/2020   Wish to be Dead (Lifetime) Yes   Non-Specific Active Suicidal Thoughts (Lifetime) Yes   Most Severe Ideation Rating (Lifetime) 4   Frequency (Lifetime) 4   Duration (Lifetime) 4   Controllability (Lifetime) 3   Protective Factors  (Lifetime) 2   Reasons for Ideation (Lifetime) 4   RETIRED: 1. Wish to be Dead (Recent) No   RETIRED: 2. Non-Specific Active Suicidal Thoughts (Recent) No   3. Active Suicidal Ideation with any Methods (Not Plan) Without Intent to Act (Lifetime) Yes   RETIRED: 3. Active Suicidal Ideation with any Methods (Not Plan) Without Intent to Act (Recent) No   RETIRE: 4. Active Suicidal Ideation with Some Intent to Act, Without Specific Plan (Lifetime) Yes   4. Active Suicidal Ideation with Some Intent to Act, Without Specific Plan (Recent) No   RETIRE: 5. Active Suicidal Ideation with Specific Plan and Intent (Lifetime) Yes   RETIRED: 5. Active Suicidal Ideation with Specific Plan and Intent (Recent) No    Most Severe Ideation Rating (Past Month) NA   Frequency (Past Month) NA   Duration (Past Month) NA   Controllability (Past Month) NA   Protective Factors (Past Month) NA   Reasons for Ideation (Past Month) NA   Actual Attempt (Lifetime) Yes   Actual Attempt (Past 3 Months) No   Has subject engaged in non-suicidal self-injurious behavior? (Lifetime) No   Has subject engaged in non-suicidal self-injurious behavior? (Past 3 Months) No   Interrupted Attempts (Lifetime) No   Interrupted Attempts (Past 3 Months) No   Aborted or Self-Interrupted Attempt (Lifetime) No   Aborted or Self-Interrupted Attempt (Past 3 Months) No   Preparatory Acts or Behavior (Lifetime) No   Preparatory Acts or Behavior (Past 3 Months) No   Most Recent Attempt Actual Lethality Code 2   Most Lethal Attempt Actual Lethality Code 2   Initial/First Attempt Actual Lethality Code 2       Appearance:   Unable to assess   Eye Contact:   Unable to assess   Psychomotor Behavior: Unable to assess   Attitude:   Cooperative  Friendly Pleasant  Orientation:   All  Speech   Rate / Production: Slow    Volume:  Normal   Mood:    Depressed   Affect:    Lethargic   Thought Content:  Clear   Thought Form:  Coherent  Logical   Insight:    Good     Diagnoses:  1. Major depressive disorder, recurrent episode, mild (H)        Collateral Reports Completed:   Not Applicable    Plan: (Homework, other):  Shadia was given information about behavioral services and encouraged to schedule a follow up appointment with the clinic [facility] in 1 week.  CD Recommendations: No indications of CD issues.     Edmundo Hansen Psy.D, LP   Behavioral Health Clinician   [facility] [place]   June 6, 2022      ______________________________________________________________________                                            Individual Treatment Plan    Patient's Name: Shadia Mcgrath  YOB: 1980    Date of Creation: 3/8/2022  Date Treatment Plan Last  Reviewed/Revised:   6/6/2022    DSM5 Diagnoses: 296.31 (F33.0) Major Depressive Disorder, Recurrent Episode, Mild _  Psychosocial / Contextual Factors: Hx of difficulties with anger  PROMIS (reviewed every 90 days): not completed by pt    Referral / Collaboration:  Referral to another professional/service is not indicated at this time..    Anticipated number of session for this episode of care: 6-8  Anticipation frequency of session: Every other week  Anticipated Duration of each session: 38-52 minutes  Treatment plan will be reviewed in 90 days or when goals have been changed.       MeasurableTreatment Goal(s) related to diagnosis / functional impairment(s)  Goal 1: Patient will experience a reduction in depressive symptoms along with a corresponding increase in positive emotion and life satisfaction.    I will know I've met my goal when I dont feel as down all the time.      Objective #A (Patient Action)    Patient will Increase interest, engagement, and pleasure in doing things.  Status: Continued - Date(s):  6/6/2022    Intervention(s)  Therapist will help patient identify pleasant and mastery oriented events that elicit positive, relaxed mood.    Objective #B  Patient will Decrease frequency and intensity of feeling down, depressed, hopeless.  Status: Continued - Date(s):  6/6/2022    Intervention(s)  Therapist will introduce patient to cognitive-behavioral and acceptance and commitment therapy topics aimed to help reduce depression and anxiety    Objective #C  Patient will Identify negative self-talk and behaviors: challenge core beliefs, myths, and actions  Improve concentration, focus, and mindfulness in daily activities .  Status: Continued - Date(s):  6/6/2022    Intervention(s)  Therapist will help patient identify and manage negative self-talk and automatic thoughts; introduce patient to cognitive distortions; help patient develop cognitive diffusion techniques      Goal 2: Patiient will learn adaptive  ways to successfully manage conflict and difficulties with anger while also experiencing a corresponding improvement in relationship satisfaction    I will know I've met my goal when I don't get so angry.      Objective #A (Patient Action)    Status: Continued - Date(s):  6/6/2022    Patient will identify patterns of escalation  (i.e. tightness in chest, flushed face, increased heart rate, clenched hands, etc.).    Intervention(s)  Therapist will help patient identify physical and cognitive factors associated with anger to improve mindfulness.    Objective #B  Patient will use progressive relaxation exercise once in the morning and once in the evening to help relieve tension  practice deep diaphragm breathing once daily for at least 5 minutes to reduce anger / irritability and regain a calmer, more clear state of mind.    Status: Continued - Date(s):  6/6/2022    Intervention(s)  Therapist will help patient identify breathing and relaxation techniques to help manage anger.    Objective #C  Patient will identify at least 2-3 alternative response(s) to aggressive behaviors  identify at least 2-3 techniques for intervening on the escalation.  Status: Continued - Date(s):  6/6/2022    Intervention(s)  Therapist will help patient identify alternative, adaptive responses to anger and aggressive behaviors toward others and ways to successfuly de-escalate inteprersonal situations where conflict occurs.    Other Possible Therapeutic Intervention(s):    Psycho-education regarding mental health diagnoses and treatment options    Supportive Therapy    Provide affirmations, reflections, and establish working rapport    Emphasize and reflect on strength of therapeutic relationship    Skills training    Explore skills useful to client in current situation.    Skills include assertiveness, communication, conflict management, problem-solving, relaxation, etc.    Solution-Focused Therapy    Explore patterns in patient's relationships  and discuss options for new behaviors.    Explore patterns in patient's actions and choices and discuss options for new behaviors.    Cognitive-behavioral Therapy    Discuss common cognitive distortions, identify them in patient's life.    Explore ways to challenge, replace, and act against these cognitions.    Acceptance and Commitment Therapy    Explore and identify important values in patient's life.    Discuss ways to commit to behavioral activation around these values.    Psychodynamic psychotherapy    Discuss patient's emotional dynamics and issues and how they impact behaviors.    Explore patient's history of relationships and how they impact present behaviors.    Explore how to work with and make changes in these schemas and patterns.    Narrative Therapy    Explore the patient's story of his/her life from his/her perspective.    Explore alternate ways of understanding their experience, identifying exceptions, developing new themes.    Interpersonal Psychotherapy    Explore patterns in relationships that are effective or ineffective at helping patient reach their goals, find satisfying experience.    Discuss new patterns or behaviors to engage in for improved social functioning.    Behavioral Activation    Discuss steps patient can take to become more involved in meaningful activity.    Identify barriers to these activities and explore possible solutions.    Mindfulness-Based Strategies    Discuss skills based on development and application of mindfulness.    Skills drawn from compassion-focused therapy, dialectical behavior therapy, mindfulness-based stress reduction, mindfulness-based cognitive therapy, etc.      Patient has reviewed and agreed to the above plan.      Edmundo Hansen Psy.D, LP   Behavioral Health Clinician   -[facility]     6/6/2022

## 2022-06-13 ENCOUNTER — VIRTUAL VISIT (OUTPATIENT)
Dept: BEHAVIORAL HEALTH | Facility: CLINIC | Age: 42
End: 2022-06-13
Payer: COMMERCIAL

## 2022-06-13 DIAGNOSIS — F33.41 MAJOR DEPRESSIVE DISORDER, RECURRENT, IN PARTIAL REMISSION (H): Primary | ICD-10-CM

## 2022-06-13 PROCEDURE — 90832 PSYTX W PT 30 MINUTES: CPT | Mod: 95 | Performed by: PSYCHOLOGIST

## 2022-06-13 NOTE — PROGRESS NOTES
"[facility]: Integrated Behavioral Health   2022      Behavioral Health Clinician Progress Note    Patient Name: Shadia Mcgrath           Service Type: Phone Visit      Service Location:  Phone Visit      Session Start Time: 3:02  Session End Time: 3:20      Session Length: 16 - 37      Attendees: Patient     Visit Activities (Refresh list every visit): Phone Encounter    Service Modality:  Phone Visit:      Provider verified identity through the following two step process.  Patient provided:  Patient  and Patient is known previously to provider    The patient has been notified of the following:      \"We have found that certain health care needs can be provided without the need for a face to face visit.  This service lets us provide the care you need with a phone conversation.       I will have full access to your [facility] medical record during this entire phone call.   I will be taking notes for your medical record.      Since this is like an office visit, we will bill your insurance company for this service.       There are potential benefits and risks of telephone visits (e.g. limits to patient confidentiality) that differ from in-person visits.?Confidentiality still applies for telephone services, and nobody will record the visit.  It is important to be in a quiet, private space that is free of distractions (including cell phone or other devices) during the visit.??      If during the course of the call I believe a telephone visit is not appropriate, you will not be charged for this service\"     Consent has been obtained for this service by care team member: Yes     Diagnostic Assessment Date: 10/5/2020  Treatment Plan Review Date: 2022  See Flowsheets for today's PHQ-9 and ZACH-7 results  Previous PHQ-9:   PHQ-9 SCORE 2020   PHQ-9 Total Score 13     Previous ZACH-7: No flowsheet data found.       DATA  Extended Session (60+ minutes): " "No  Interactive Complexity: No  Crisis: No    Treatment Objective(s) Addressed in This Session:  Target Behavior(s): disease management/lifestyle changes related to psychosocial and relational stress    Depressed Mood: Increase interest, engagement, and pleasure in doing things  Decrease frequency and intensity of feeling down, depressed, hopeless  Identify negative self-talk and behaviors: challenge core beliefs, myths, and actions  Relationship Problems: will address relationship difficulties in a more adaptive manner       Current Stressors / Issues:  [facility] met with Shadia today over the phone to continue supporting her treatment of depression, reported difficulties with anger and relationship problems. We discussed the following topics today:    Reports doing well in most areas of life right now. Work at her new job is going well. Cites some residual concerns about her previous job where she is staying on one day per week. She suspects her previous employer is trying to \"get rid of her\" and we explored her thoughts about this. Discussed her ambivalence about leaving her previous job all together, how financial and personal reasons keep her there, though we explored her readiness/importance of leaving due to the repeated interpersonal conflicts he experiences there.     Denies changes to relationships and supports. Notes this is going well overall. Endorses her mood as stable, upbeat.     Processed the observation that she has sounded distracted during our sessions; she has seemed less engaged. Discussed her treatment plan and agreed to move out sessions to once per month due to her progress. Encouraged her to think about outstanding goals if she wishes to continue meeting 1:1.     Progress on Treatment Objective(s) / Homework:  Satisfactory progress - ACTION (Actively working towards change); Intervened by reinforcing change plan / affirming steps taken    Care Plan review completed: Yes       Medication " Review:  No changes to current psychiatric medication(s)    Medication Compliance:  Yes    Changes in Health Issues:   None reported    Chemical Use Review:   Substance Use: Chemical use reviewed, no active concerns identified      Tobacco Use: No current tobacco use.      Assessment: Current Emotional / Mental Status (status of significant symptoms):  Risk status (Self / Other harm or suicidal ideation)  Patient has had a history of suicidal ideation: SI as a youth only and suicide attempts: one prior attempt at age 15; none recent  Patient denies current fears or concerns for personal safety.     Patient denies current or recent suicidal ideation or behaviors.  Patient denies current or recent homicidal ideation or behaviors.  Patient denies current or recent self injurious behavior or ideation.  Patient denies other safety concerns.     A safety and risk management plan has not been developed at this time, however patient was encouraged to call [place] [phone] should there be a change in any of these risk factors.    [employer] Suicide Severity Rating Scale (Lifetime/Recent)  [employer] Suicide Severity Rating (Lifetime/Recent) 9/21/2020   Wish to be Dead (Lifetime) Yes   Non-Specific Active Suicidal Thoughts (Lifetime) Yes   Most Severe Ideation Rating (Lifetime) 4   Frequency (Lifetime) 4   Duration (Lifetime) 4   Controllability (Lifetime) 3   Protective Factors  (Lifetime) 2   Reasons for Ideation (Lifetime) 4   RETIRED: 1. Wish to be Dead (Recent) No   RETIRED: 2. Non-Specific Active Suicidal Thoughts (Recent) No   3. Active Suicidal Ideation with any Methods (Not Plan) Without Intent to Act (Lifetime) Yes   RETIRED: 3. Active Suicidal Ideation with any Methods (Not Plan) Without Intent to Act (Recent) No   RETIRE: 4. Active Suicidal Ideation with Some Intent to Act, Without Specific Plan (Lifetime) Yes   4. Active Suicidal Ideation with Some Intent to Act, Without Specific Plan (Recent) No   RETIRE: 5.  Active Suicidal Ideation with Specific Plan and Intent (Lifetime) Yes   RETIRED: 5. Active Suicidal Ideation with Specific Plan and Intent (Recent) No   Most Severe Ideation Rating (Past Month) NA   Frequency (Past Month) NA   Duration (Past Month) NA   Controllability (Past Month) NA   Protective Factors (Past Month) NA   Reasons for Ideation (Past Month) NA   Actual Attempt (Lifetime) Yes   Actual Attempt (Past 3 Months) No   Has subject engaged in non-suicidal self-injurious behavior? (Lifetime) No   Has subject engaged in non-suicidal self-injurious behavior? (Past 3 Months) No   Interrupted Attempts (Lifetime) No   Interrupted Attempts (Past 3 Months) No   Aborted or Self-Interrupted Attempt (Lifetime) No   Aborted or Self-Interrupted Attempt (Past 3 Months) No   Preparatory Acts or Behavior (Lifetime) No   Preparatory Acts or Behavior (Past 3 Months) No   Most Recent Attempt Actual Lethality Code 2   Most Lethal Attempt Actual Lethality Code 2   Initial/First Attempt Actual Lethality Code 2       Appearance:   Unable to assess   Eye Contact:   Unable to assess   Psychomotor Behavior: Unable to assess   Attitude:   Cooperative  Friendly Pleasant  Orientation:   All  Speech   Rate / Production: Slow    Volume:  Normal   Mood:    Depressed   Affect:    Lethargic   Thought Content:  Clear   Thought Form:  Coherent  Logical   Insight:    Good     Diagnoses:  1. Major depressive disorder, recurrent, in partial remission (H)        Collateral Reports Completed:   Not Applicable    Plan: (Homework, other):  Shadia was given information about behavioral services and encouraged to schedule a follow up appointment with the clinic [facility] in 1 month, as needed.  Recommended she consider outstanding therapy goals. CD Recommendations: No indications of CD issues.     Edmundo Hansen Psy.D, LP   Behavioral Health Clinician   [facility]   June 13,  2022        ______________________________________________________________________                                            Individual Treatment Plan    Patient's Name: Shadia Mcgrath  YOB: 1980    Date of Creation: 3/8/2022  Date Treatment Plan Last Reviewed/Revised:   6/6/2022    DSM5 Diagnoses: 296.31 (F33.0) Major Depressive Disorder, Recurrent Episode, Mild _  Psychosocial / Contextual Factors: Hx of difficulties with anger  PROMIS (reviewed every 90 days): not completed by pt    Referral / Collaboration:  Referral to another professional/service is not indicated at this time..    Anticipated number of session for this episode of care: 6-8  Anticipation frequency of session: Every other week  Anticipated Duration of each session: 38-52 minutes  Treatment plan will be reviewed in 90 days or when goals have been changed.       MeasurableTreatment Goal(s) related to diagnosis / functional impairment(s)  Goal 1: Patient will experience a reduction in depressive symptoms along with a corresponding increase in positive emotion and life satisfaction.    I will know I've met my goal when I dont feel as down all the time.      Objective #A (Patient Action)    Patient will Increase interest, engagement, and pleasure in doing things.  Status: Continued - Date(s):  6/6/2022    Intervention(s)  Therapist will help patient identify pleasant and mastery oriented events that elicit positive, relaxed mood.    Objective #B  Patient will Decrease frequency and intensity of feeling down, depressed, hopeless.  Status: Continued - Date(s):  6/6/2022    Intervention(s)  Therapist will introduce patient to cognitive-behavioral and acceptance and commitment therapy topics aimed to help reduce depression and anxiety    Objective #C  Patient will Identify negative self-talk and behaviors: challenge core beliefs, myths, and actions  Improve concentration, focus, and mindfulness in daily activities .  Status:  Continued - Date(s):  6/6/2022    Intervention(s)  Therapist will help patient identify and manage negative self-talk and automatic thoughts; introduce patient to cognitive distortions; help patient develop cognitive diffusion techniques      Goal 2: Patiient will learn adaptive ways to successfully manage conflict and difficulties with anger while also experiencing a corresponding improvement in relationship satisfaction    I will know I've met my goal when I don't get so angry.      Objective #A (Patient Action)    Status: Continued - Date(s):  6/6/2022    Patient will identify patterns of escalation  (i.e. tightness in chest, flushed face, increased heart rate, clenched hands, etc.).    Intervention(s)  Therapist will help patient identify physical and cognitive factors associated with anger to improve mindfulness.    Objective #B  Patient will use progressive relaxation exercise once in the morning and once in the evening to help relieve tension  practice deep diaphragm breathing once daily for at least 5 minutes to reduce anger / irritability and regain a calmer, more clear state of mind.    Status: Continued - Date(s):  6/6/2022    Intervention(s)  Therapist will help patient identify breathing and relaxation techniques to help manage anger.    Objective #C  Patient will identify at least 2-3 alternative response(s) to aggressive behaviors  identify at least 2-3 techniques for intervening on the escalation.  Status: Continued - Date(s):  6/6/2022    Intervention(s)  Therapist will help patient identify alternative, adaptive responses to anger and aggressive behaviors toward others and ways to successfuly de-escalate inteprersonal situations where conflict occurs.    Other Possible Therapeutic Intervention(s):    Psycho-education regarding mental health diagnoses and treatment options    Supportive Therapy    Provide affirmations, reflections, and establish working rapport    Emphasize and reflect on strength  of therapeutic relationship    Skills training    Explore skills useful to client in current situation.    Skills include assertiveness, communication, conflict management, problem-solving, relaxation, etc.    Solution-Focused Therapy    Explore patterns in patient's relationships and discuss options for new behaviors.    Explore patterns in patient's actions and choices and discuss options for new behaviors.    Cognitive-behavioral Therapy    Discuss common cognitive distortions, identify them in patient's life.    Explore ways to challenge, replace, and act against these cognitions.    Acceptance and Commitment Therapy    Explore and identify important values in patient's life.    Discuss ways to commit to behavioral activation around these values.    Psychodynamic psychotherapy    Discuss patient's emotional dynamics and issues and how they impact behaviors.    Explore patient's history of relationships and how they impact present behaviors.    Explore how to work with and make changes in these schemas and patterns.    Narrative Therapy    Explore the patient's story of his/her life from his/her perspective.    Explore alternate ways of understanding their experience, identifying exceptions, developing new themes.    Interpersonal Psychotherapy    Explore patterns in relationships that are effective or ineffective at helping patient reach their goals, find satisfying experience.    Discuss new patterns or behaviors to engage in for improved social functioning.    Behavioral Activation    Discuss steps patient can take to become more involved in meaningful activity.    Identify barriers to these activities and explore possible solutions.    Mindfulness-Based Strategies    Discuss skills based on development and application of mindfulness.    Skills drawn from compassion-focused therapy, dialectical behavior therapy, mindfulness-based stress reduction, mindfulness-based cognitive therapy, etc.      Patient has  reviewed and agreed to the above plan.      Edmundo Hansen Psy.D, LP   Behavioral Health Clinician   -[facility]     6/6/2022

## 2022-07-13 ENCOUNTER — VIRTUAL VISIT (OUTPATIENT)
Dept: BEHAVIORAL HEALTH | Facility: CLINIC | Age: 42
End: 2022-07-13
Payer: COMMERCIAL

## 2022-07-13 DIAGNOSIS — F33.41 MAJOR DEPRESSIVE DISORDER, RECURRENT, IN PARTIAL REMISSION (H): Primary | ICD-10-CM

## 2022-07-13 PROCEDURE — 90832 PSYTX W PT 30 MINUTES: CPT | Mod: 95 | Performed by: PSYCHOLOGIST

## 2022-07-13 NOTE — PROGRESS NOTES
"[facility] Integrated Behavioral Health   2022        Behavioral Health Clinician Progress Note    Patient Name: Shadia Mcgrath           Service Type: Phone Visit      Service Location:  Phone Visit      Session Start Time: 3:35  Session End Time: 4:04      Session Length: 16 - 37      Attendees: Patient     Visit Activities (Refresh list every visit): Phone Encounter    Service Modality:  Phone Visit:      Provider verified identity through the following two step process.  Patient provided:  Patient  and Patient is known previously to provider    The patient has been notified of the following:      \"We have found that certain health care needs can be provided without the need for a face to face visit.  This service lets us provide the care you need with a phone conversation.       I will have full access to your [facility] medical record during this entire phone call.   I will be taking notes for your medical record.      Since this is like an office visit, we will bill your insurance company for this service.       There are potential benefits and risks of telephone visits (e.g. limits to patient confidentiality) that differ from in-person visits.?Confidentiality still applies for telephone services, and nobody will record the visit.  It is important to be in a quiet, private space that is free of distractions (including cell phone or other devices) during the visit.??      If during the course of the call I believe a telephone visit is not appropriate, you will not be charged for this service\"     Consent has been obtained for this service by care team member: Yes     Diagnostic Assessment Date: 10/5/2020  Treatment Plan Review Date: 2022  See Flowsheets for today's PHQ-9 and ZACH-7 results  Previous PHQ-9:   PHQ-9 SCORE 2020   PHQ-9 Total Score 13     Previous ZACH-7: No flowsheet data found.       DATA  Extended Session (60+ minutes): " No  Interactive Complexity: No  Crisis: No    Treatment Objective(s) Addressed in This Session:  Target Behavior(s): disease management/lifestyle changes related to psychosocial and relational stress    Depressed Mood: Increase interest, engagement, and pleasure in doing things  Decrease frequency and intensity of feeling down, depressed, hopeless  Identify negative self-talk and behaviors: challenge core beliefs, myths, and actions  Relationship Problems: will address relationship difficulties in a more adaptive manner       Current Stressors / Issues:  [facility] met with Shadia today over the phone to continue supporting her treatment of depression, reported difficulties with anger and relationship problems. We discussed the following topics today:    Reports experiencing a series of seizures last Sunday on July 10th. She notes that she is not cleared to return to work at [employer] until she meets with a neurologist. 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 As we talked, we explored her readiness to resign from that position, which is something she has considered before. Utilized motivational interviewing to explore her readiness and importance of change with resigning from her job there. Explored values and what is important to her, like her health (she notes the stress from the job is likely affecting her physical health, seizures perhaps she suspects) and notes that she would like to be more dependable with others, like her friends and son.  Continued also to provide supportive psychotherapy.     Motivational Interviewing  Target Behavior: disease management/lifestyle changes resigning from her part-time job that is causing stress    Stage of Change: PREPARATION (Decided to change - considering how)    MI Intervention: Expressed Empathy/Understanding, Supported Autonomy, Collaboration, Evocation, Permission to raise  concern or advise, Open-ended questions, Reflections: simple and complex, Rolled with resistance: Emphasized patient autonomy, Simple reflection, Complex reflection and Evoked patient agenda, Change talk (evoked) and Reframe     Change Talk Expressed by the Patient: Reasons to change Need to change    Provider Response to Change Talk: E - Evoked more info from patient about behavior change, A - Affirmed patient's thoughts, decisions, or attempts at behavior change, R - Reflected patient's change talk and S - Summarized patient's change talk statements      Progress on Treatment Objective(s) / Homework:  Satisfactory progress - ACTION (Actively working towards change); Intervened by reinforcing change plan / affirming steps taken    Care Plan review completed: No     MI and solution-focused therapies emphasized today       Medication Review:  No changes to current psychiatric medication(s)    Medication Compliance:  Yes    Changes in Health Issues:   None reported    Chemical Use Review:   Substance Use: Chemical use reviewed, no active concerns identified      Tobacco Use: No current tobacco use.      Assessment: Current Emotional / Mental Status (status of significant symptoms):  Risk status (Self / Other harm or suicidal ideation)  Patient has had a history of suicidal ideation: SI as a youth only and suicide attempts: one prior attempt at age 15; none recent  Patient denies current fears or concerns for personal safety.     Patient denies current or recent suicidal ideation or behaviors.  Patient denies current or recent homicidal ideation or behaviors.  Patient denies current or recent self injurious behavior or ideation.  Patient denies other safety concerns.     A safety and risk management plan has not been developed at this time, however patient was encouraged to call [facility] should there be a change in any of these risk factors.    Johnson Suicide Severity Rating Scale  (Lifetime/Recent)  [place] Suicide Severity Rating (Lifetime/Recent) 9/21/2020   Wish to be Dead (Lifetime) Yes   Non-Specific Active Suicidal Thoughts (Lifetime) Yes   Most Severe Ideation Rating (Lifetime) 4   Frequency (Lifetime) 4   Duration (Lifetime) 4   Controllability (Lifetime) 3   Protective Factors  (Lifetime) 2   Reasons for Ideation (Lifetime) 4   RETIRED: 1. Wish to be Dead (Recent) No   RETIRED: 2. Non-Specific Active Suicidal Thoughts (Recent) No   3. Active Suicidal Ideation with any Methods (Not Plan) Without Intent to Act (Lifetime) Yes   RETIRED: 3. Active Suicidal Ideation with any Methods (Not Plan) Without Intent to Act (Recent) No   RETIRE: 4. Active Suicidal Ideation with Some Intent to Act, Without Specific Plan (Lifetime) Yes   4. Active Suicidal Ideation with Some Intent to Act, Without Specific Plan (Recent) No   RETIRE: 5. Active Suicidal Ideation with Specific Plan and Intent (Lifetime) Yes   RETIRED: 5. Active Suicidal Ideation with Specific Plan and Intent (Recent) No   Most Severe Ideation Rating (Past Month) NA   Frequency (Past Month) NA   Duration (Past Month) NA   Controllability (Past Month) NA   Protective Factors (Past Month) NA   Reasons for Ideation (Past Month) NA   Actual Attempt (Lifetime) Yes   Actual Attempt (Past 3 Months) No   Has subject engaged in non-suicidal self-injurious behavior? (Lifetime) No   Has subject engaged in non-suicidal self-injurious behavior? (Past 3 Months) No   Interrupted Attempts (Lifetime) No   Interrupted Attempts (Past 3 Months) No   Aborted or Self-Interrupted Attempt (Lifetime) No   Aborted or Self-Interrupted Attempt (Past 3 Months) No   Preparatory Acts or Behavior (Lifetime) No   Preparatory Acts or Behavior (Past 3 Months) No   Most Recent Attempt Actual Lethality Code 2   Most Lethal Attempt Actual Lethality Code 2   Initial/First Attempt Actual Lethality Code 2       Appearance:   Unable to assess   Eye Contact:   Unable to assess    Psychomotor Behavior: Unable to assess   Attitude:   Cooperative  Friendly Pleasant  Orientation:   All  Speech   Rate / Production: Slow    Volume:  Normal   Mood:    Depressed   Affect:    Lethargic   Thought Content:  Clear   Thought Form:  Coherent  Logical   Insight:    Good     Diagnoses:  1. Major depressive disorder, recurrent, in partial remission (H)        Collateral Reports Completed:   Not Applicable    Plan: (Homework, other):  Shadia was given information about behavioral services and encouraged to schedule a follow up appointment with the clinic [facility] in 2 weeks.  Provided support and encouragement toward thinking of values, how changes she makes with work could help her engage with them. CD Recommendations: No indications of CD issues.     Edmundo Hansen Psy.D, LP   Behavioral Health Clinician   [facility]     July 13, 2022          ______________________________________________________________________                                            Individual Treatment Plan    Patient's Name: Shadia Mcgrath  YOB: 1980    Date of Creation: 3/8/2022  Date Treatment Plan Last Reviewed/Revised:   6/6/2022    DSM5 Diagnoses: 296.31 (F33.0) Major Depressive Disorder, Recurrent Episode, Mild _  Psychosocial / Contextual Factors: Hx of difficulties with anger  PROMIS (reviewed every 90 days): not completed by pt    Referral / Collaboration:  Referral to another professional/service is not indicated at this time..    Anticipated number of session for this episode of care: 6-8  Anticipation frequency of session: Every other week  Anticipated Duration of each session: 38-52 minutes  Treatment plan will be reviewed in 90 days or when goals have been changed.       MeasurableTreatment Goal(s) related to diagnosis / functional impairment(s)  Goal 1: Patient will experience a reduction in depressive symptoms along with a corresponding increase in positive emotion  and life satisfaction.    I will know I've met my goal when I dont feel as down all the time.      Objective #A (Patient Action)    Patient will Increase interest, engagement, and pleasure in doing things.  Status: Continued - Date(s):  6/6/2022    Intervention(s)  Therapist will help patient identify pleasant and mastery oriented events that elicit positive, relaxed mood.    Objective #B  Patient will Decrease frequency and intensity of feeling down, depressed, hopeless.  Status: Continued - Date(s):  6/6/2022    Intervention(s)  Therapist will introduce patient to cognitive-behavioral and acceptance and commitment therapy topics aimed to help reduce depression and anxiety    Objective #C  Patient will Identify negative self-talk and behaviors: challenge core beliefs, myths, and actions  Improve concentration, focus, and mindfulness in daily activities .  Status: Continued - Date(s):  6/6/2022    Intervention(s)  Therapist will help patient identify and manage negative self-talk and automatic thoughts; introduce patient to cognitive distortions; help patient develop cognitive diffusion techniques      Goal 2: Patiient will learn adaptive ways to successfully manage conflict and difficulties with anger while also experiencing a corresponding improvement in relationship satisfaction    I will know I've met my goal when I don't get so angry.      Objective #A (Patient Action)    Status: Continued - Date(s):  6/6/2022    Patient will identify patterns of escalation  (i.e. tightness in chest, flushed face, increased heart rate, clenched hands, etc.).    Intervention(s)  Therapist will help patient identify physical and cognitive factors associated with anger to improve mindfulness.    Objective #B  Patient will use progressive relaxation exercise once in the morning and once in the evening to help relieve tension  practice deep diaphragm breathing once daily for at least 5 minutes to reduce anger / irritability and  regain a calmer, more clear state of mind.    Status: Continued - Date(s):  6/6/2022    Intervention(s)  Therapist will help patient identify breathing and relaxation techniques to help manage anger.    Objective #C  Patient will identify at least 2-3 alternative response(s) to aggressive behaviors  identify at least 2-3 techniques for intervening on the escalation.  Status: Continued - Date(s):  6/6/2022    Intervention(s)  Therapist will help patient identify alternative, adaptive responses to anger and aggressive behaviors toward others and ways to successfuly de-escalate inteprersonal situations where conflict occurs.    Other Possible Therapeutic Intervention(s):    Psycho-education regarding mental health diagnoses and treatment options    Supportive Therapy    Provide affirmations, reflections, and establish working rapport    Emphasize and reflect on strength of therapeutic relationship    Skills training    Explore skills useful to client in current situation.    Skills include assertiveness, communication, conflict management, problem-solving, relaxation, etc.    Solution-Focused Therapy    Explore patterns in patient's relationships and discuss options for new behaviors.    Explore patterns in patient's actions and choices and discuss options for new behaviors.    Cognitive-behavioral Therapy    Discuss common cognitive distortions, identify them in patient's life.    Explore ways to challenge, replace, and act against these cognitions.    Acceptance and Commitment Therapy    Explore and identify important values in patient's life.    Discuss ways to commit to behavioral activation around these values.    Psychodynamic psychotherapy    Discuss patient's emotional dynamics and issues and how they impact behaviors.    Explore patient's history of relationships and how they impact present behaviors.    Explore how to work with and make changes in these schemas and patterns.    Narrative Therapy    Explore  the patient's story of his/her life from his/her perspective.    Explore alternate ways of understanding their experience, identifying exceptions, developing new themes.    Interpersonal Psychotherapy    Explore patterns in relationships that are effective or ineffective at helping patient reach their goals, find satisfying experience.    Discuss new patterns or behaviors to engage in for improved social functioning.    Behavioral Activation    Discuss steps patient can take to become more involved in meaningful activity.    Identify barriers to these activities and explore possible solutions.    Mindfulness-Based Strategies    Discuss skills based on development and application of mindfulness.    Skills drawn from compassion-focused therapy, dialectical behavior therapy, mindfulness-based stress reduction, mindfulness-based cognitive therapy, etc.      Patient has reviewed and agreed to the above plan.      Edmundo Hansen Psy.D, LP   Behavioral Health Clinician   -[facility]     6/6/2022

## 2022-07-28 ENCOUNTER — VIRTUAL VISIT (OUTPATIENT)
Dept: BEHAVIORAL HEALTH | Facility: CLINIC | Age: 42
End: 2022-07-28
Payer: COMMERCIAL

## 2022-07-28 DIAGNOSIS — Z53.9 ERRONEOUS ENCOUNTER--DISREGARD: Primary | ICD-10-CM

## 2022-07-28 NOTE — PROGRESS NOTES
Appointment Cancelled.     Imani reports experiencing phone issues during session today. Call dropped several times and connection was inconsistent. Agreed to reschedule in 2 weeks. Notes doing well overall.     Edmundo Hansen Psy.D, LP   Behavioral Health Clinician   [facility]

## 2022-08-11 ENCOUNTER — VIRTUAL VISIT (OUTPATIENT)
Dept: BEHAVIORAL HEALTH | Facility: CLINIC | Age: 42
End: 2022-08-11

## 2022-08-11 DIAGNOSIS — F33.41 MAJOR DEPRESSIVE DISORDER, RECURRENT, IN PARTIAL REMISSION (H): Primary | ICD-10-CM

## 2022-08-11 PROCEDURE — 90832 PSYTX W PT 30 MINUTES: CPT | Mod: 95 | Performed by: PSYCHOLOGIST

## 2022-08-11 NOTE — PROGRESS NOTES
"[facility]: Integrated Behavioral Health   2022        Behavioral Health Clinician Progress Note    Patient Name: Shadia Mcgrath           Service Type: Phone Visit      Service Location:  Phone Visit      Session Start Time: 2:00 Session End Time: 2:16      Session Length: 16 - 37      Attendees: Patient     Visit Activities (Refresh list every visit): Phone Encounter    Service Modality:  Phone Visit:      Provider verified identity through the following two step process.  Patient provided:  Patient  and Patient is known previously to provider    The patient has been notified of the following:      \"We have found that certain health care needs can be provided without the need for a face to face visit.  This service lets us provide the care you need with a phone conversation.       I will have full access to your [facility] medical record during this entire phone call.   I will be taking notes for your medical record.      Since this is like an office visit, we will bill your insurance company for this service.       There are potential benefits and risks of telephone visits (e.g. limits to patient confidentiality) that differ from in-person visits.?Confidentiality still applies for telephone services, and nobody will record the visit.  It is important to be in a quiet, private space that is free of distractions (including cell phone or other devices) during the visit.??      If during the course of the call I believe a telephone visit is not appropriate, you will not be charged for this service\"     Consent has been obtained for this service by care team member: Yes     Diagnostic Assessment Date: 10/5/2020  Treatment Plan Review Date: 2022  See Flowsheets for today's PHQ-9 and ZACH-7 results  Previous PHQ-9:   PHQ-9 SCORE 2020   PHQ-9 Total Score 13     Previous ZACH-7: No flowsheet data found.       DATA  Extended Session (60+ minutes): " "No  Interactive Complexity: No  Crisis: No    Treatment Objective(s) Addressed in This Session:  Target Behavior(s): disease management/lifestyle changes related to psychosocial and relational stress    Depressed Mood: Increase interest, engagement, and pleasure in doing things  Decrease frequency and intensity of feeling down, depressed, hopeless  Identify negative self-talk and behaviors: challenge core beliefs, myths, and actions  Relationship Problems: will address relationship difficulties in a more adaptive manner       Current Stressors / Issues:  [facility] met with Shadia today over the phone to continue supporting her treatment of depression, reported difficulties with anger and relationship problems. We discussed the following topics today:    Reviewed her progress in therapy - notes doing well overall, sees a significant amount of improvement in her ability to manage anger and relationship difficulties. Shadia notes others have made comments to her that she seems more upbeat, less reactive to stress, which makes her feel positive. Affirmed the steps she has taken toward adaptive change, such as using mindfulness and utilizing the anger management strategies we discussed. Reviewed outstanding goals, framed these as being like \"maintenace of a house\" that it takes ongoing effort, but can be easier over time. She identified interest in continuing to work on stress and anger management, practicing the skills we discussed.     Agreed to move to monthly sessions for now in light of progress.       Progress on Treatment Objective(s) / Homework:  Satisfactory progress - ACTION (Actively working towards change); Intervened by reinforcing change plan / affirming steps taken    Care Plan review completed: No     MI and solution-focused therapies emphasized today       Medication Review:  No changes to current psychiatric medication(s)    Medication Compliance:  Yes    Changes in Health Issues:   None " reported    Chemical Use Review:   Substance Use: Chemical use reviewed, no active concerns identified      Tobacco Use: No current tobacco use.      Assessment: Current Emotional / Mental Status (status of significant symptoms):  Risk status (Self / Other harm or suicidal ideation)  Patient has had a history of suicidal ideation: SI as a youth only and suicide attempts: one prior attempt at age 15; none recent  Patient denies current fears or concerns for personal safety.     Patient denies current or recent suicidal ideation or behaviors.  Patient denies current or recent homicidal ideation or behaviors.  Patient denies current or recent self injurious behavior or ideation.  Patient denies other safety concerns.     A safety and risk management plan has not been developed at this time, however patient was encouraged to call [place] [phone] should there be a change in any of these risk factors.    [place] Suicide Severity Rating Scale (Lifetime/Recent)  [place] Suicide Severity Rating (Lifetime/Recent) 9/21/2020   Wish to be Dead (Lifetime) Yes   Non-Specific Active Suicidal Thoughts (Lifetime) Yes   Most Severe Ideation Rating (Lifetime) 4   Frequency (Lifetime) 4   Duration (Lifetime) 4   Controllability (Lifetime) 3   Protective Factors  (Lifetime) 2   Reasons for Ideation (Lifetime) 4   RETIRED: 1. Wish to be Dead (Recent) No   RETIRED: 2. Non-Specific Active Suicidal Thoughts (Recent) No   3. Active Suicidal Ideation with any Methods (Not Plan) Without Intent to Act (Lifetime) Yes   RETIRED: 3. Active Suicidal Ideation with any Methods (Not Plan) Without Intent to Act (Recent) No   RETIRE: 4. Active Suicidal Ideation with Some Intent to Act, Without Specific Plan (Lifetime) Yes   4. Active Suicidal Ideation with Some Intent to Act, Without Specific Plan (Recent) No   RETIRE: 5. Active Suicidal Ideation with Specific Plan and Intent (Lifetime) Yes   RETIRED: 5. Active Suicidal Ideation with Specific Plan  and Intent (Recent) No   Most Severe Ideation Rating (Past Month) NA   Frequency (Past Month) NA   Duration (Past Month) NA   Controllability (Past Month) NA   Protective Factors (Past Month) NA   Reasons for Ideation (Past Month) NA   Actual Attempt (Lifetime) Yes   Actual Attempt (Past 3 Months) No   Has subject engaged in non-suicidal self-injurious behavior? (Lifetime) No   Has subject engaged in non-suicidal self-injurious behavior? (Past 3 Months) No   Interrupted Attempts (Lifetime) No   Interrupted Attempts (Past 3 Months) No   Aborted or Self-Interrupted Attempt (Lifetime) No   Aborted or Self-Interrupted Attempt (Past 3 Months) No   Preparatory Acts or Behavior (Lifetime) No   Preparatory Acts or Behavior (Past 3 Months) No   Most Recent Attempt Actual Lethality Code 2   Most Lethal Attempt Actual Lethality Code 2   Initial/First Attempt Actual Lethality Code 2       Appearance:   Unable to assess   Eye Contact:   Unable to assess   Psychomotor Behavior: Unable to assess   Attitude:   Cooperative  Friendly Pleasant  Orientation:   All  Speech   Rate / Production: Slow    Volume:  Normal   Mood:    Depressed   Affect:    Lethargic   Thought Content:  Clear   Thought Form:  Coherent  Logical   Insight:    Good     Diagnoses:  1. Major depressive disorder, recurrent, in partial remission (H)        Collateral Reports Completed:   Not Applicable    Plan: (Homework, other):  Shadia was given information about behavioral services and encouraged to schedule a follow up appointment with the clinic [facility] in 1 month.  Provided support and encouragement toward thinking of values, how changes she makes with work could help her engage with them. CD Recommendations: No indications of CD issues.     Edmundo Hansen Psy.D, LP   Behavioral Health Clinician   [facility]     August 11, 2022            ______________________________________________________________________                                             Individual Treatment Plan    Patient's Name: Shadia Mcgrath  YOB: 1980    Date of Creation: 3/8/2022  Date Treatment Plan Last Reviewed/Revised:   6/6/2022    DSM5 Diagnoses: 296.31 (F33.0) Major Depressive Disorder, Recurrent Episode, Mild _  Psychosocial / Contextual Factors: Hx of difficulties with anger  PROMIS (reviewed every 90 days): not completed by pt    Referral / Collaboration:  Referral to another professional/service is not indicated at this time..    Anticipated number of session for this episode of care: 6-8  Anticipation frequency of session: Every other week  Anticipated Duration of each session: 38-52 minutes  Treatment plan will be reviewed in 90 days or when goals have been changed.       MeasurableTreatment Goal(s) related to diagnosis / functional impairment(s)  Goal 1: Patient will experience a reduction in depressive symptoms along with a corresponding increase in positive emotion and life satisfaction.    I will know I've met my goal when I dont feel as down all the time.      Objective #A (Patient Action)    Patient will Increase interest, engagement, and pleasure in doing things.  Status: Continued - Date(s):  6/6/2022    Intervention(s)  Therapist will help patient identify pleasant and mastery oriented events that elicit positive, relaxed mood.    Objective #B  Patient will Decrease frequency and intensity of feeling down, depressed, hopeless.  Status: Continued - Date(s):  6/6/2022    Intervention(s)  Therapist will introduce patient to cognitive-behavioral and acceptance and commitment therapy topics aimed to help reduce depression and anxiety    Objective #C  Patient will Identify negative self-talk and behaviors: challenge core beliefs, myths, and actions  Improve concentration, focus, and mindfulness in daily activities .  Status: Continued - Date(s):  6/6/2022    Intervention(s)  Therapist will help patient identify and manage negative  self-talk and automatic thoughts; introduce patient to cognitive distortions; help patient develop cognitive diffusion techniques      Goal 2: Patiient will learn adaptive ways to successfully manage conflict and difficulties with anger while also experiencing a corresponding improvement in relationship satisfaction    I will know I've met my goal when I don't get so angry.      Objective #A (Patient Action)    Status: Continued - Date(s):  6/6/2022    Patient will identify patterns of escalation  (i.e. tightness in chest, flushed face, increased heart rate, clenched hands, etc.).    Intervention(s)  Therapist will help patient identify physical and cognitive factors associated with anger to improve mindfulness.    Objective #B  Patient will use progressive relaxation exercise once in the morning and once in the evening to help relieve tension  practice deep diaphragm breathing once daily for at least 5 minutes to reduce anger / irritability and regain a calmer, more clear state of mind.    Status: Continued - Date(s):  6/6/2022    Intervention(s)  Therapist will help patient identify breathing and relaxation techniques to help manage anger.    Objective #C  Patient will identify at least 2-3 alternative response(s) to aggressive behaviors  identify at least 2-3 techniques for intervening on the escalation.  Status: Continued - Date(s):  6/6/2022    Intervention(s)  Therapist will help patient identify alternative, adaptive responses to anger and aggressive behaviors toward others and ways to successfuly de-escalate inteprersonal situations where conflict occurs.    Other Possible Therapeutic Intervention(s):    Psycho-education regarding mental health diagnoses and treatment options    Supportive Therapy    Provide affirmations, reflections, and establish working rapport    Emphasize and reflect on strength of therapeutic relationship    Skills training    Explore skills useful to client in current  situation.    Skills include assertiveness, communication, conflict management, problem-solving, relaxation, etc.    Solution-Focused Therapy    Explore patterns in patient's relationships and discuss options for new behaviors.    Explore patterns in patient's actions and choices and discuss options for new behaviors.    Cognitive-behavioral Therapy    Discuss common cognitive distortions, identify them in patient's life.    Explore ways to challenge, replace, and act against these cognitions.    Acceptance and Commitment Therapy    Explore and identify important values in patient's life.    Discuss ways to commit to behavioral activation around these values.    Psychodynamic psychotherapy    Discuss patient's emotional dynamics and issues and how they impact behaviors.    Explore patient's history of relationships and how they impact present behaviors.    Explore how to work with and make changes in these schemas and patterns.    Narrative Therapy    Explore the patient's story of his/her life from his/her perspective.    Explore alternate ways of understanding their experience, identifying exceptions, developing new themes.    Interpersonal Psychotherapy    Explore patterns in relationships that are effective or ineffective at helping patient reach their goals, find satisfying experience.    Discuss new patterns or behaviors to engage in for improved social functioning.    Behavioral Activation    Discuss steps patient can take to become more involved in meaningful activity.    Identify barriers to these activities and explore possible solutions.    Mindfulness-Based Strategies    Discuss skills based on development and application of mindfulness.    Skills drawn from compassion-focused therapy, dialectical behavior therapy, mindfulness-based stress reduction, mindfulness-based cognitive therapy, etc.      Patient has reviewed and agreed to the above plan.      Edmundo Hansen Psy.D, LP   Behavioral Health Clinician    [facility]     6/6/2022

## 2022-09-12 ENCOUNTER — VIRTUAL VISIT (OUTPATIENT)
Dept: BEHAVIORAL HEALTH | Facility: CLINIC | Age: 42
End: 2022-09-12
Payer: COMMERCIAL

## 2022-09-12 DIAGNOSIS — F33.41 MAJOR DEPRESSIVE DISORDER, RECURRENT, IN PARTIAL REMISSION (H): Primary | ICD-10-CM

## 2022-09-12 PROCEDURE — 90834 PSYTX W PT 45 MINUTES: CPT | Mod: 95 | Performed by: PSYCHOLOGIST

## 2022-09-12 NOTE — PROGRESS NOTES
"[facility]: Integrated Behavioral Health   2022        Behavioral Health Clinician Progress Note    Patient Name: Shadia Mcgrath           Service Type: Phone Visit      Service Location:  Phone Visit      Session Start Time: 1:05  Session End Time: 1:51      Session Length: 38 - 52      Attendees: Patient     Visit Activities (Refresh list every visit): Phone Encounter    Service Modality:  Phone Visit:      Provider verified identity through the following two step process.  Patient provided:  Patient  and Patient is known previously to provider    The patient has been notified of the following:      \"We have found that certain health care needs can be provided without the need for a face to face visit.  This service lets us provide the care you need with a phone conversation.       I will have full access to your [facility] medical record during this entire phone call.   I will be taking notes for your medical record.      Since this is like an office visit, we will bill your insurance company for this service.       There are potential benefits and risks of telephone visits (e.g. limits to patient confidentiality) that differ from in-person visits.?Confidentiality still applies for telephone services, and nobody will record the visit.  It is important to be in a quiet, private space that is free of distractions (including cell phone or other devices) during the visit.??      If during the course of the call I believe a telephone visit is not appropriate, you will not be charged for this service\"     Consent has been obtained for this service by care team member: Yes     Diagnostic Assessment Date: 10/5/2020  Treatment Plan Review Date:   See Flowsheets for today's PHQ-9 and ZACH-7 results  Previous PHQ-9:   PHQ-9 SCORE 2020   PHQ-9 Total Score 13     Previous ZACH-7: No flowsheet data found.       DATA  Extended Session (60+ minutes): " No  Interactive Complexity: No  Crisis: No    Treatment Objective(s) Addressed in This Session:  Target Behavior(s): disease management/lifestyle changes related to psychosocial and relational stress    Depressed Mood: Increase interest, engagement, and pleasure in doing things  Decrease frequency and intensity of feeling down, depressed, hopeless  Identify negative self-talk and behaviors: challenge core beliefs, myths, and actions  Relationship Problems: will address relationship difficulties in a more adaptive manner       Current Stressors / Issues:  [facility] met with Shadia today over the phone to continue supporting her treatment of depression, reported difficulties with anger and relationship problems. We discussed the following topics today:    Reports doing well overall though cites stress from two recent conflicts with others and with matters pertaining to her health. Long discussion today about the two instances she shared when she experienced anger, one of which she acted on by getting defensive she reports. Reviewed therapy skills we have previously discussed, exploring which would have been applicable to these scenarios.     Continued to reinforce the adaptive steps Imani has taken toward change/management of her difficulties with anger. Affirmed proactive behaviors and reinforced ideas of how they reduced/mitigated conflict with others. Identified outstanding goals she would like to continue working on, such as her health management and practicing of assertiveness and conflict management skills.     MI and solution-focused therapies emphasized today.    Progress on Treatment Objective(s) / Homework:  Satisfactory progress - ACTION (Actively working towards change); Intervened by reinforcing change plan / affirming steps taken    Care Plan review completed: No       Medication Review:  No changes to current psychiatric medication(s)    Medication Compliance:  Yes    Changes in Health Issues:   None  reported    Chemical Use Review:   Substance Use: Chemical use reviewed, no active concerns identified      Tobacco Use: No current tobacco use.      Assessment: Current Emotional / Mental Status (status of significant symptoms):  Risk status (Self / Other harm or suicidal ideation)  Patient has had a history of suicidal ideation: SI as a youth only and suicide attempts: one prior attempt at age 15; none recent  Patient denies current fears or concerns for personal safety.     Patient denies current or recent suicidal ideation or behaviors.  Patient denies current or recent homicidal ideation or behaviors.  Patient denies current or recent self injurious behavior or ideation.  Patient denies other safety concerns.     A safety and risk management plan has not been developed at this time, however patient was encouraged to call [place] [id number] should there be a change in any of these risk factors.    [place] Suicide Severity Rating Scale (Lifetime/Recent)  [place] Suicide Severity Rating (Lifetime/Recent) 9/21/2020   Wish to be Dead (Lifetime) Yes   Non-Specific Active Suicidal Thoughts (Lifetime) Yes   Most Severe Ideation Rating (Lifetime) 4   Frequency (Lifetime) 4   Duration (Lifetime) 4   Controllability (Lifetime) 3   Protective Factors  (Lifetime) 2   Reasons for Ideation (Lifetime) 4   RETIRED: 1. Wish to be Dead (Recent) No   RETIRED: 2. Non-Specific Active Suicidal Thoughts (Recent) No   3. Active Suicidal Ideation with any Methods (Not Plan) Without Intent to Act (Lifetime) Yes   RETIRED: 3. Active Suicidal Ideation with any Methods (Not Plan) Without Intent to Act (Recent) No   RETIRE: 4. Active Suicidal Ideation with Some Intent to Act, Without Specific Plan (Lifetime) Yes   4. Active Suicidal Ideation with Some Intent to Act, Without Specific Plan (Recent) No   RETIRE: 5. Active Suicidal Ideation with Specific Plan and Intent (Lifetime) Yes   RETIRED: 5. Active Suicidal Ideation with Specific Plan  and Intent (Recent) No   Most Severe Ideation Rating (Past Month) NA   Frequency (Past Month) NA   Duration (Past Month) NA   Controllability (Past Month) NA   Protective Factors (Past Month) NA   Reasons for Ideation (Past Month) NA   Actual Attempt (Lifetime) Yes   Actual Attempt (Past 3 Months) No   Has subject engaged in non-suicidal self-injurious behavior? (Lifetime) No   Has subject engaged in non-suicidal self-injurious behavior? (Past 3 Months) No   Interrupted Attempts (Lifetime) No   Interrupted Attempts (Past 3 Months) No   Aborted or Self-Interrupted Attempt (Lifetime) No   Aborted or Self-Interrupted Attempt (Past 3 Months) No   Preparatory Acts or Behavior (Lifetime) No   Preparatory Acts or Behavior (Past 3 Months) No   Most Recent Attempt Actual Lethality Code 2   Most Lethal Attempt Actual Lethality Code 2   Initial/First Attempt Actual Lethality Code 2       Appearance:   Unable to assess   Eye Contact:   Unable to assess   Psychomotor Behavior: Unable to assess   Attitude:   Cooperative  Friendly Pleasant  Orientation:   All  Speech   Rate / Production: Normal/ Responsive   Volume:  Normal   Mood:    Depressed ; improving  Affect:    Lethargic   Thought Content:  Clear   Thought Form:  Coherent  Logical   Insight:    Good     Diagnoses:  1. Major depressive disorder, recurrent, in partial remission (H)        Collateral Reports Completed:   Not Applicable    Plan: (Homework, other):  Shadia was given information about behavioral services and encouraged to schedule a follow up appointment with the clinic [facility] in 1 month.  Provided support and encouragement toward thinking of values, how changes she makes with work could help her engage with them. CD Recommendations: No indications of CD issues.     Edmundo Hansen Psy.D, LP   Behavioral Health Clinician   [facility]     September 12,  2022          ______________________________________________________________________                                            Individual Treatment Plan    Patient's Name: Shadia Mcgrath  YOB: 1980    Date of Creation: 3/8/2022  Date Treatment Plan Last Reviewed/Revised:   9/12/2022    DSM5 Diagnoses: 296.31 (F33.0) Major Depressive Disorder, Recurrent Episode, Mild _  Psychosocial / Contextual Factors: Hx of difficulties with anger  PROMIS (reviewed every 90 days): not completed by pt    Referral / Collaboration:  Referral to another professional/service is not indicated at this time..    Anticipated number of session for this episode of care: 6-8  Anticipation frequency of session: Every other week  Anticipated Duration of each session: 38-52 minutes  Treatment plan will be reviewed in 90 days or when goals have been changed.       MeasurableTreatment Goal(s) related to diagnosis / functional impairment(s)  Goal 1: Patient will experience a reduction in depressive symptoms along with a corresponding increase in positive emotion and life satisfaction.    I will know I've met my goal when I dont feel as down all the time.      Objective #A (Patient Action)    Patient will Increase interest, engagement, and pleasure in doing things.  Status: Continued - Date(s):  9/12/2022    Intervention(s)  Therapist will help patient identify pleasant and mastery oriented events that elicit positive, relaxed mood.    Objective #B  Patient will Decrease frequency and intensity of feeling down, depressed, hopeless.  Status: Continued - Date(s):  9/12/2022    Intervention(s)  Therapist will introduce patient to cognitive-behavioral and acceptance and commitment therapy topics aimed to help reduce depression and anxiety    Objective #C  Patient will Identify negative self-talk and behaviors: challenge core beliefs, myths, and actions  Improve concentration, focus, and mindfulness in daily activities .  Status:  Continued - Date(s):  9/12/2022    Intervention(s)  Therapist will help patient identify and manage negative self-talk and automatic thoughts; introduce patient to cognitive distortions; help patient develop cognitive diffusion techniques      Goal 2: Patiient will learn adaptive ways to successfully manage conflict and difficulties with anger while also experiencing a corresponding improvement in relationship satisfaction    I will know I've met my goal when I don't get so angry.      Objective #A (Patient Action)    Status: Continued - Date(s):  9/12/2022    Patient will identify patterns of escalation  (i.e. tightness in chest, flushed face, increased heart rate, clenched hands, etc.).    Intervention(s)  Therapist will help patient identify physical and cognitive factors associated with anger to improve mindfulness.    Objective #B  Patient will use progressive relaxation exercise once in the morning and once in the evening to help relieve tension  practice deep diaphragm breathing once daily for at least 5 minutes to reduce anger / irritability and regain a calmer, more clear state of mind.    Status: Continued - Date(s):  9/12/2022    Intervention(s)  Therapist will help patient identify breathing and relaxation techniques to help manage anger.    Objective #C  Patient will identify at least 2-3 alternative response(s) to aggressive behaviors  identify at least 2-3 techniques for intervening on the escalation.  Status: Continued - Date(s):  9/12/2022    Intervention(s)  Therapist will help patient identify alternative, adaptive responses to anger and aggressive behaviors toward others and ways to successfuly de-escalate inteprersonal situations where conflict occurs.    Other Possible Therapeutic Intervention(s):    Psycho-education regarding mental health diagnoses and treatment options    Supportive Therapy    Provide affirmations, reflections, and establish working rapport    Emphasize and reflect on  strength of therapeutic relationship    Skills training    Explore skills useful to client in current situation.    Skills include assertiveness, communication, conflict management, problem-solving, relaxation, etc.    Solution-Focused Therapy    Explore patterns in patient's relationships and discuss options for new behaviors.    Explore patterns in patient's actions and choices and discuss options for new behaviors.    Cognitive-behavioral Therapy    Discuss common cognitive distortions, identify them in patient's life.    Explore ways to challenge, replace, and act against these cognitions.    Acceptance and Commitment Therapy    Explore and identify important values in patient's life.    Discuss ways to commit to behavioral activation around these values.    Psychodynamic psychotherapy    Discuss patient's emotional dynamics and issues and how they impact behaviors.    Explore patient's history of relationships and how they impact present behaviors.    Explore how to work with and make changes in these schemas and patterns.    Narrative Therapy    Explore the patient's story of his/her life from his/her perspective.    Explore alternate ways of understanding their experience, identifying exceptions, developing new themes.    Interpersonal Psychotherapy    Explore patterns in relationships that are effective or ineffective at helping patient reach their goals, find satisfying experience.    Discuss new patterns or behaviors to engage in for improved social functioning.    Behavioral Activation    Discuss steps patient can take to become more involved in meaningful activity.    Identify barriers to these activities and explore possible solutions.    Mindfulness-Based Strategies    Discuss skills based on development and application of mindfulness.    Skills drawn from compassion-focused therapy, dialectical behavior therapy, mindfulness-based stress reduction, mindfulness-based cognitive therapy, etc.      Patient  has reviewed and agreed to the above plan.      Edmundo Hansen Psy.D, LP   Behavioral Health Clinician   -[facility]     9/12/2022

## 2022-09-18 ENCOUNTER — HEALTH MAINTENANCE LETTER (OUTPATIENT)
Age: 42
End: 2022-09-18

## 2022-10-10 ENCOUNTER — VIRTUAL VISIT (OUTPATIENT)
Dept: BEHAVIORAL HEALTH | Facility: CLINIC | Age: 42
End: 2022-10-10
Payer: COMMERCIAL

## 2022-10-10 DIAGNOSIS — F33.41 MAJOR DEPRESSIVE DISORDER, RECURRENT, IN PARTIAL REMISSION (H): Primary | ICD-10-CM

## 2022-10-10 DIAGNOSIS — J45.909 SEVERE ASTHMA, UNSPECIFIED WHETHER COMPLICATED, UNSPECIFIED WHETHER PERSISTENT: ICD-10-CM

## 2022-10-10 PROCEDURE — 90834 PSYTX W PT 45 MINUTES: CPT | Mod: 95 | Performed by: PSYCHOLOGIST

## 2022-10-10 NOTE — PROGRESS NOTES
"[facility]: Integrated Behavioral Health   October 10, 2022        Behavioral Health Clinician Progress Note    Patient Name: Shadia Mcgrath           Service Type: Phone Visit      Service Location:  Phone Visit      Session Start Time: 2:10  Session End Time: 3:00      Session Length: 38 - 52      Attendees: Patient     Visit Activities (Refresh list every visit): Phone Encounter    Service Modality:  Phone Visit:      Provider verified identity through the following two step process.  Patient provided:  Patient  and Patient is known previously to provider    The patient has been notified of the following:      \"We have found that certain health care needs can be provided without the need for a face to face visit.  This service lets us provide the care you need with a phone conversation.       I will have full access to your [facility] medical record during this entire phone call.   I will be taking notes for your medical record.      Since this is like an office visit, we will bill your insurance company for this service.       There are potential benefits and risks of telephone visits (e.g. limits to patient confidentiality) that differ from in-person visits.?Confidentiality still applies for telephone services, and nobody will record the visit.  It is important to be in a quiet, private space that is free of distractions (including cell phone or other devices) during the visit.??      If during the course of the call I believe a telephone visit is not appropriate, you will not be charged for this service\"     Consent has been obtained for this service by care team member: Yes     Originating Location (pt. Location): Home    Distant Location (provider location):  [facility] [place] [facility]      [place]ostic Assessment Date: 10/5/2020  Treatment Plan Review Date: 2022  See Flowsheets for today's PHQ-9 and ZACH-7 " results  Previous PHQ-9:   PHQ-9 SCORE 8/27/2020   PHQ-9 Total Score 13     Previous ZACH-7: No flowsheet data found.       DATA  Extended Session (60+ minutes): No  Interactive Complexity: No  Crisis: No    Treatment Objective(s) Addressed in This Session:  Target Behavior(s): disease management/lifestyle changes related to psychosocial and relational stress    Depressed Mood: Increase interest, engagement, and pleasure in doing things  Decrease frequency and intensity of feeling down, depressed, hopeless  Identify negative self-talk and behaviors: challenge core beliefs, myths, and actions  Relationship Problems: will address relationship difficulties in a more adaptive manner       Current Stressors / Issues:  [facility] met with Shadia today over the phone to continue supporting her treatment of depression and reported difficulties with anger and relationship problems. We discussed the following topics today:    Reports concern today with depressed mood marked by anhedonia, low energy, and low motivation. She notes observing herself stay in bed more, feel less interested in doing things she enjoys like cooking for her son. She reports feeling bothered by a numbness-like experience where she doesn't seem bothered by distressful events. Discussed how loss of interest and anhedonia can be symptoms of depressed mood. Discussed possible contributing factors, including her being off work until she consults with neurology. Recommended Imani consult with her PCP about her symptoms during her appointment she has with her provider tomorrow.     Additionally discussed behavioral activation and MI ideas to help improve mood. Identified active behaviors that help her mood, such as grocery shopping for a meal she would like to prepare. Utilized MI and behavioral ideas to help with this, including developing a SMART goal around this. Imani identified going to [employer] to purchase ingredients for a meal tonight after our session as an  example. Encouraged her to monitor and track how her mood changes upon activation around pleasant/mastery events.     Imani denied SI in the context of her depressive symptoms today. No safety concerns were identified.     Progress on Treatment Objective(s) / Homework:  Satisfactory progress - ACTION (Actively working towards change); Intervened by reinforcing change plan / affirming steps taken    Care Plan review completed: No     Behavioral Activation    Discuss steps patient can take to become more involved in meaningful activity.    Identify barriers to these activities and explore possible solutions.    Motivational Interviewing  Target Behavior: disease management/lifestyle changes      Stage of Change: PREPARATION (Decided to change - considering how)    MI Intervention: Co-Developed Goal: go grocery shopping, Supported Autonomy, Collaboration, Evocation, Permission to raise concern or advise, Open-ended questions, Reflections: simple and complex, Rolled with resistance: Emphasized patient autonomy, Simple reflection, Complex reflection, Amplified reflection (weaker or stronger meaning), Reframed sustain talk in the direction of change and Evoked patient agenda, Change talk (evoked) and Reframe     Change Talk Expressed by the Patient: Desire to change Need to change    Provider Response to Change Talk: E - Evoked more info from patient about behavior change, A - Affirmed patient's thoughts, decisions, or attempts at behavior change, R - Reflected patient's change talk and S - Summarized patient's change talk statements      Medication Review:  No changes to current psychiatric medication(s)    Medication Compliance:  Yes    Changes in Health Issues:   None reported    Chemical Use Review:   Substance Use: Chemical use reviewed, no active concerns identified      Tobacco Use: No current tobacco use.      Assessment: Current Emotional / Mental Status (status of significant symptoms):  Risk status (Self /  Other harm or suicidal ideation)  Patient has had a history of suicidal ideation: SI as a youth only and suicide attempts: one prior attempt at age 15; none recent  Patient denies current fears or concerns for personal safety.     Patient denies current or recent suicidal ideation or behaviors.  Patient denies current or recent homicidal ideation or behaviors.  Patient denies current or recent self injurious behavior or ideation.  Patient denies other safety concerns.     A safety and risk management plan has not been developed at this time, however patient was encouraged to call [place] [phone] should there be a change in any of these risk factors.    [place] Suicide Severity Rating Scale (Lifetime/Recent)  [place] Suicide Severity Rating (Lifetime/Recent) 9/21/2020   Wish to be Dead (Lifetime) Yes   Non-Specific Active Suicidal Thoughts (Lifetime) Yes   Most Severe Ideation Rating (Lifetime) 4   Frequency (Lifetime) 4   Duration (Lifetime) 4   Controllability (Lifetime) 3   Protective Factors  (Lifetime) 2   Reasons for Ideation (Lifetime) 4   RETIRED: 1. Wish to be Dead (Recent) No   RETIRED: 2. Non-Specific Active Suicidal Thoughts (Recent) No   3. Active Suicidal Ideation with any Methods (Not Plan) Without Intent to Act (Lifetime) Yes   RETIRED: 3. Active Suicidal Ideation with any Methods (Not Plan) Without Intent to Act (Recent) No   RETIRE: 4. Active Suicidal Ideation with Some Intent to Act, Without Specific Plan (Lifetime) Yes   4. Active Suicidal Ideation with Some Intent to Act, Without Specific Plan (Recent) No   RETIRE: 5. Active Suicidal Ideation with Specific Plan and Intent (Lifetime) Yes   RETIRED: 5. Active Suicidal Ideation with Specific Plan and Intent (Recent) No   Most Severe Ideation Rating (Past Month) NA   Frequency (Past Month) NA   Duration (Past Month) NA   Controllability (Past Month) NA   Protective Factors (Past Month) NA   Reasons for Ideation (Past Month) NA   Actual Attempt  (Lifetime) Yes   Actual Attempt (Past 3 Months) No   Has subject engaged in non-suicidal self-injurious behavior? (Lifetime) No   Has subject engaged in non-suicidal self-injurious behavior? (Past 3 Months) No   Interrupted Attempts (Lifetime) No   Interrupted Attempts (Past 3 Months) No   Aborted or Self-Interrupted Attempt (Lifetime) No   Aborted or Self-Interrupted Attempt (Past 3 Months) No   Preparatory Acts or Behavior (Lifetime) No   Preparatory Acts or Behavior (Past 3 Months) No   Most Recent Attempt Actual Lethality Code 2   Most Lethal Attempt Actual Lethality Code 2   Initial/First Attempt Actual Lethality Code 2       Appearance:   Unable to assess   Eye Contact:   Unable to assess   Psychomotor Behavior: Unable to assess   Attitude:   Cooperative  Friendly Pleasant  Orientation:   All  Speech   Rate / Production: Normal/ Responsive   Volume:  Normal   Mood:    Depressed ; improving  Affect:    Lethargic   Thought Content:  Clear   Thought Form:  Coherent  Logical   Insight:    Good     Diagnoses:  1. Major depressive disorder, recurrent, in partial remission (H)        Collateral Reports Completed:   Not Applicable    Plan: (Homework, other):  Shadia was given information about behavioral services and encouraged to schedule a follow up appointment with the clinic [facility] in 1 month.  Provided support and encouragement toward thinking of values, how changes she makes with work could help her engage with them. CD Recommendations: No indications of CD issues.     Edmundo Hansen Psy.D, LP   Behavioral Health Clinician   [facility]       ______________________________________________________________________                                            Individual Treatment Plan    Patient's Name: Shadia Mcgrath  YOB: 1980    Date of Creation: 3/8/2022  Date Treatment Plan Last Reviewed/Revised:   9/12/2022    DSM5 Diagnoses: 296.31 (F33.0) Major Depressive  Disorder, Recurrent Episode, Mild _  Psychosocial / Contextual Factors: Hx of difficulties with anger  PROMIS (reviewed every 90 days): not completed by pt    Referral / Collaboration:  Referral to another professional/service is not indicated at this time..    Anticipated number of session for this episode of care: 6-8  Anticipation frequency of session: Every other week  Anticipated Duration of each session: 38-52 minutes  Treatment plan will be reviewed in 90 days or when goals have been changed.       MeasurableTreatment Goal(s) related to diagnosis / functional impairment(s)  Goal 1: Patient will experience a reduction in depressive symptoms along with a corresponding increase in positive emotion and life satisfaction.    I will know I've met my goal when I dont feel as down all the time.      Objective #A (Patient Action)    Patient will Increase interest, engagement, and pleasure in doing things.  Status: Continued - Date(s):  9/12/2022    Intervention(s)  Therapist will help patient identify pleasant and mastery oriented events that elicit positive, relaxed mood.    Objective #B  Patient will Decrease frequency and intensity of feeling down, depressed, hopeless.  Status: Continued - Date(s):  9/12/2022    Intervention(s)  Therapist will introduce patient to cognitive-behavioral and acceptance and commitment therapy topics aimed to help reduce depression and anxiety    Objective #C  Patient will Identify negative self-talk and behaviors: challenge core beliefs, myths, and actions  Improve concentration, focus, and mindfulness in daily activities .  Status: Continued - Date(s):  9/12/2022    Intervention(s)  Therapist will help patient identify and manage negative self-talk and automatic thoughts; introduce patient to cognitive distortions; help patient develop cognitive diffusion techniques      Goal 2: Patiient will learn adaptive ways to successfully manage conflict and difficulties with anger while also  experiencing a corresponding improvement in relationship satisfaction    I will know I've met my goal when I don't get so angry.      Objective #A (Patient Action)    Status: Continued - Date(s):  9/12/2022    Patient will identify patterns of escalation  (i.e. tightness in chest, flushed face, increased heart rate, clenched hands, etc.).    Intervention(s)  Therapist will help patient identify physical and cognitive factors associated with anger to improve mindfulness.    Objective #B  Patient will use progressive relaxation exercise once in the morning and once in the evening to help relieve tension  practice deep diaphragm breathing once daily for at least 5 minutes to reduce anger / irritability and regain a calmer, more clear state of mind.    Status: Continued - Date(s):  9/12/2022    Intervention(s)  Therapist will help patient identify breathing and relaxation techniques to help manage anger.    Objective #C  Patient will identify at least 2-3 alternative response(s) to aggressive behaviors  identify at least 2-3 techniques for intervening on the escalation.  Status: Continued - Date(s):  9/12/2022    Intervention(s)  Therapist will help patient identify alternative, adaptive responses to anger and aggressive behaviors toward others and ways to successfuly de-escalate inteprersonal situations where conflict occurs.    Other Possible Therapeutic Intervention(s):    Psycho-education regarding mental health diagnoses and treatment options    Supportive Therapy    Provide affirmations, reflections, and establish working rapport    Emphasize and reflect on strength of therapeutic relationship    Skills training    Explore skills useful to client in current situation.    Skills include assertiveness, communication, conflict management, problem-solving, relaxation, etc.    Solution-Focused Therapy    Explore patterns in patient's relationships and discuss options for new behaviors.    Explore patterns in patient's  actions and choices and discuss options for new behaviors.    Cognitive-behavioral Therapy    Discuss common cognitive distortions, identify them in patient's life.    Explore ways to challenge, replace, and act against these cognitions.    Acceptance and Commitment Therapy    Explore and identify important values in patient's life.    Discuss ways to commit to behavioral activation around these values.    Psychodynamic psychotherapy    Discuss patient's emotional dynamics and issues and how they impact behaviors.    Explore patient's history of relationships and how they impact present behaviors.    Explore how to work with and make changes in these schemas and patterns.    Narrative Therapy    Explore the patient's story of his/her life from his/her perspective.    Explore alternate ways of understanding their experience, identifying exceptions, developing new themes.    Interpersonal Psychotherapy    Explore patterns in relationships that are effective or ineffective at helping patient reach their goals, find satisfying experience.    Discuss new patterns or behaviors to engage in for improved social functioning.    Behavioral Activation    Discuss steps patient can take to become more involved in meaningful activity.    Identify barriers to these activities and explore possible solutions.    Mindfulness-Based Strategies    Discuss skills based on development and application of mindfulness.    Skills drawn from compassion-focused therapy, dialectical behavior therapy, mindfulness-based stress reduction, mindfulness-based cognitive therapy, etc.      Patient has reviewed and agreed to the above plan.      Edmundo Hansen Psy.D, LP   Behavioral Health Clinician   -[facility]     9/12/2022

## 2022-10-10 NOTE — TELEPHONE ENCOUNTER
Routing refill request to provider for review/approval because:  Drug not on the G refill protocol     Requested Prescriptions   Pending Prescriptions Disp Refills    azithromycin (ZITHROMAX) 250 MG tablet 30 tablet 3     Sig: Take 1 tablet (250 mg) by mouth daily       There is no refill protocol information for this order           Silvia Angel RN   [facility]

## 2022-10-11 RX ORDER — AZITHROMYCIN 250 MG/1
250 TABLET, FILM COATED ORAL DAILY
Qty: 30 TABLET | Refills: 11 | Status: SHIPPED | OUTPATIENT
Start: 2022-10-11

## 2022-10-21 ENCOUNTER — VIRTUAL VISIT (OUTPATIENT)
Dept: BEHAVIORAL HEALTH | Facility: CLINIC | Age: 42
End: 2022-10-21
Payer: COMMERCIAL

## 2022-10-21 DIAGNOSIS — Z53.9 ERRONEOUS ENCOUNTER--DISREGARD: Primary | ICD-10-CM

## 2022-10-31 ENCOUNTER — VIRTUAL VISIT (OUTPATIENT)
Dept: BEHAVIORAL HEALTH | Facility: CLINIC | Age: 42
End: 2022-10-31
Payer: COMMERCIAL

## 2022-10-31 DIAGNOSIS — Z53.9 ERRONEOUS ENCOUNTER--DISREGARD: Primary | ICD-10-CM

## 2022-11-07 ENCOUNTER — VIRTUAL VISIT (OUTPATIENT)
Dept: BEHAVIORAL HEALTH | Facility: CLINIC | Age: 42
End: 2022-11-07
Payer: COMMERCIAL

## 2022-11-07 DIAGNOSIS — F33.0 MAJOR DEPRESSIVE DISORDER, RECURRENT EPISODE, MILD (H): Primary | ICD-10-CM

## 2022-11-07 PROCEDURE — 90834 PSYTX W PT 45 MINUTES: CPT | Mod: 95 | Performed by: PSYCHOLOGIST

## 2022-11-07 NOTE — PROGRESS NOTES
"[facility]: Integrated Behavioral Health   2022        Behavioral Health Clinician Progress Note    Patient Name: Shadia Mcgrath           Service Type: Phone Visit      Service Location:  Phone Visit      Session Start Time: 10:05  Session End Time: 10:56      Session Length: 38 - 52      Attendees: Patient     Visit Activities (Refresh list every visit): Phone Encounter    Service Modality:  Phone Visit:      Provider verified identity through the following two step process.  Patient provided:  Patient  and Patient is known previously to provider    The patient has been notified of the following:      \"We have found that certain health care needs can be provided without the need for a face to face visit.  This service lets us provide the care you need with a phone conversation.       I will have full access to your [facility] medical record during this entire phone call.   I will be taking notes for your medical record.      Since this is like an office visit, we will bill your insurance company for this service.       There are potential benefits and risks of telephone visits (e.g. limits to patient confidentiality) that differ from in-person visits.?Confidentiality still applies for telephone services, and nobody will record the visit.  It is important to be in a quiet, private space that is free of distractions (including cell phone or other devices) during the visit.??      If during the course of the call I believe a telephone visit is not appropriate, you will not be charged for this service\"     Consent has been obtained for this service by care team member: Yes       Diagnostic Assessment Date: 10/5/2020  Treatment Plan Review Date: 2022  See Flowsheets for today's PHQ-9 and ZACH-7 results  Previous PHQ-9:   PHQ-9 SCORE 2020   PHQ-9 Total Score 13     Previous ZACH-7: No flowsheet data found.       DATA  Extended Session (60+ minutes): " No  Interactive Complexity: No  Crisis: No    Treatment Objective(s) Addressed in This Session:  Target Behavior(s): disease management/lifestyle changes related to psychosocial and relational stress    Depressed Mood: Increase interest, engagement, and pleasure in doing things  Decrease frequency and intensity of feeling down, depressed, hopeless  Identify negative self-talk and behaviors: challenge core beliefs, myths, and actions  Relationship Problems: will address relationship difficulties in a more adaptive manner       Current Stressors / Issues:  [facility] met with Shadia today over the phone to continue supporting her treatment of depression and reported difficulties with anger and relationship problems. We discussed the following topics today:    Reports ongoing difficulties with depressed mood. Per her recent neurology visit, Imani states she was suspected of experiencing dissociative identity disorder (DID) in the absence of data suggesting she experiences seizures. Long discussion today about DID, its relationship to trauma, and how it can manifest in daily experiences. Imani notes she has names for her various personalities (e.g. Shadia, who is angry/mean), though after discussing this further, her report sounds more consistent with naming various personality traits, rather than experiencing distinct personality identities with a corresponding lapse in consciousness. She denied each personality seeming to have its own autobiographical information as well, which also does not suggest DID.     [facility] recommended Imani consult with psychiatry and her PCP. She will get a referral from her PCP who works for [employer]. She as also recommended to complete psychological testing to help clarify diagnosis. Depression and anxiety themes are evident, though there is some evidence to suggest subthreshold PTSD with a possible dissociative element due to her history of childhood trauma. BPD might also be a factor worth  considering. PTSD can include dissociation, which is distinctive of DID as a disorder, though testing could help clarify this further. Referral placed.     Imani denied SI today with plans, means, or intent. She reports a few episodes of thoughts involving being better off dead, though denied these thoughts lasting long and has no concern about her safety. She cites her son as a protective factor.     Progress on Treatment Objective(s) / Homework:  Satisfactory progress - ACTION (Actively working towards change); Intervened by reinforcing change plan / affirming steps taken    Care Plan review completed: No     Psycho-education regarding mental health diagnoses and treatment options    Supportive Therapy    Provide affirmations, reflections, and establish working rapport    Emphasize and reflect on strength of therapeutic relationship    Skills training    Explore skills useful to client in current situation.    Skills include assertiveness, communication, conflict management, problem-solving, relaxation, etc.    Solution-Focused Therapy    Explore patterns in patient's relationships and discuss options for new behaviors.    Explore patterns in patient's actions and choices and discuss options for new behaviors.      Medication Review:  No changes to current psychiatric medication(s)    Medication Compliance:  Yes    Changes in Health Issues:   None reported    Chemical Use Review:   Substance Use: Chemical use reviewed, no active concerns identified      Tobacco Use: No current tobacco use.      Assessment: Current Emotional / Mental Status (status of significant symptoms):  Risk status (Self / Other harm or suicidal ideation)  Patient has had a history of suicidal ideation: SI as a youth only and suicide attempts: one prior attempt at age 15; none recent  Patient denies current fears or concerns for personal safety.     Patient denies current or recent suicidal ideation or behaviors.  Patient denies current or  recent homicidal ideation or behaviors.  Patient denies current or recent self injurious behavior or ideation.  Patient denies other safety concerns.     A safety and risk management plan has not been developed at this time, however patient was encouraged to call [place] [phone] should there be a change in any of these risk factors.    Lamb Suicide Severity Rating Scale (Lifetime/Recent)  Lamb Suicide Severity Rating (Lifetime/Recent) 9/21/2020   Wish to be Dead (Lifetime) Yes   Non-Specific Active Suicidal Thoughts (Lifetime) Yes   Most Severe Ideation Rating (Lifetime) 4   Frequency (Lifetime) 4   Duration (Lifetime) 4   Controllability (Lifetime) 3   Protective Factors  (Lifetime) 2   Reasons for Ideation (Lifetime) 4   RETIRED: 1. Wish to be Dead (Recent) No   RETIRED: 2. Non-Specific Active Suicidal Thoughts (Recent) No   3. Active Suicidal Ideation with any Methods (Not Plan) Without Intent to Act (Lifetime) Yes   RETIRED: 3. Active Suicidal Ideation with any Methods (Not Plan) Without Intent to Act (Recent) No   RETIRE: 4. Active Suicidal Ideation with Some Intent to Act, Without Specific Plan (Lifetime) Yes   4. Active Suicidal Ideation with Some Intent to Act, Without Specific Plan (Recent) No   RETIRE: 5. Active Suicidal Ideation with Specific Plan and Intent (Lifetime) Yes   RETIRED: 5. Active Suicidal Ideation with Specific Plan and Intent (Recent) No   Most Severe Ideation Rating (Past Month) NA   Frequency (Past Month) NA   Duration (Past Month) NA   Controllability (Past Month) NA   Protective Factors (Past Month) NA   Reasons for Ideation (Past Month) NA   Actual Attempt (Lifetime) Yes   Actual Attempt (Past 3 Months) No   Has subject engaged in non-suicidal self-injurious behavior? (Lifetime) No   Has subject engaged in non-suicidal self-injurious behavior? (Past 3 Months) No   Interrupted Attempts (Lifetime) No   Interrupted Attempts (Past 3 Months) No   Aborted or Self-Interrupted  Attempt (Lifetime) No   Aborted or Self-Interrupted Attempt (Past 3 Months) No   Preparatory Acts or Behavior (Lifetime) No   Preparatory Acts or Behavior (Past 3 Months) No   Most Recent Attempt Actual Lethality Code 2   Most Lethal Attempt Actual Lethality Code 2   Initial/First Attempt Actual Lethality Code 2       Appearance:   Unable to assess   Eye Contact:   Unable to assess   Psychomotor Behavior: Unable to assess   Attitude:   Cooperative  Friendly Pleasant  Orientation:   All  Speech   Rate / Production: Normal/ Responsive   Volume:  Normal   Mood:    Depressed ; improving  Affect:    Lethargic   Thought Content:  Clear   Thought Form:  Coherent  Logical   Insight:    Good     Diagnoses:  1. Major depressive disorder, recurrent episode, mild (H)      R/O Dissociative Identity Disorder  R/O PTSD with dissociative features   R/O Borderline Personality Disorder    Collateral Reports Completed:   Not Applicable    Plan: (Homework, other):  Shadia was given information about behavioral services and encouraged to schedule a follow up appointment with the clinic [facility] in 1 month.  Referred for psychological testing. CD Recommendations: No indications of CD issues.     Edmundo Hansen Psy.D, LP   Behavioral Health Clinician   [facility]       ______________________________________________________________________                                            Individual Treatment Plan    Patient's Name: Shadia Mcgrath  YOB: 1980    Date of Creation: 3/8/2022  Date Treatment Plan Last Reviewed/Revised:   9/12/2022    DSM5 Diagnoses: 296.31 (F33.0) Major Depressive Disorder, Recurrent Episode, Mild _  Psychosocial / Contextual Factors: Hx of difficulties with anger  PROMIS (reviewed every 90 days): not completed by pt    Referral / Collaboration:  Referral to another professional/service is not indicated at this time..    Anticipated number of session for this episode  of care: 6-8  Anticipation frequency of session: Every other week  Anticipated Duration of each session: 38-52 minutes  Treatment plan will be reviewed in 90 days or when goals have been changed.       MeasurableTreatment Goal(s) related to diagnosis / functional impairment(s)  Goal 1: Patient will experience a reduction in depressive symptoms along with a corresponding increase in positive emotion and life satisfaction.    I will know I've met my goal when I dont feel as down all the time.      Objective #A (Patient Action)    Patient will Increase interest, engagement, and pleasure in doing things.  Status: Continued - Date(s):  9/12/2022    Intervention(s)  Therapist will help patient identify pleasant and mastery oriented events that elicit positive, relaxed mood.    Objective #B  Patient will Decrease frequency and intensity of feeling down, depressed, hopeless.  Status: Continued - Date(s):  9/12/2022    Intervention(s)  Therapist will introduce patient to cognitive-behavioral and acceptance and commitment therapy topics aimed to help reduce depression and anxiety    Objective #C  Patient will Identify negative self-talk and behaviors: challenge core beliefs, myths, and actions  Improve concentration, focus, and mindfulness in daily activities .  Status: Continued - Date(s):  9/12/2022    Intervention(s)  Therapist will help patient identify and manage negative self-talk and automatic thoughts; introduce patient to cognitive distortions; help patient develop cognitive diffusion techniques      Goal 2: Patiient will learn adaptive ways to successfully manage conflict and difficulties with anger while also experiencing a corresponding improvement in relationship satisfaction    I will know I've met my goal when I don't get so angry.      Objective #A (Patient Action)    Status: Continued - Date(s):  9/12/2022    Patient will identify patterns of escalation  (i.e. tightness in chest, flushed face, increased heart  rate, clenched hands, etc.).    Intervention(s)  Therapist will help patient identify physical and cognitive factors associated with anger to improve mindfulness.    Objective #B  Patient will use progressive relaxation exercise once in the morning and once in the evening to help relieve tension  practice deep diaphragm breathing once daily for at least 5 minutes to reduce anger / irritability and regain a calmer, more clear state of mind.    Status: Continued - Date(s):  9/12/2022    Intervention(s)  Therapist will help patient identify breathing and relaxation techniques to help manage anger.    Objective #C  Patient will identify at least 2-3 alternative response(s) to aggressive behaviors  identify at least 2-3 techniques for intervening on the escalation.  Status: Continued - Date(s):  9/12/2022    Intervention(s)  Therapist will help patient identify alternative, adaptive responses to anger and aggressive behaviors toward others and ways to successfuly de-escalate inteprersonal situations where conflict occurs.    Other Possible Therapeutic Intervention(s):    Psycho-education regarding mental health diagnoses and treatment options    Supportive Therapy    Provide affirmations, reflections, and establish working rapport    Emphasize and reflect on strength of therapeutic relationship    Skills training    Explore skills useful to client in current situation.    Skills include assertiveness, communication, conflict management, problem-solving, relaxation, etc.    Solution-Focused Therapy    Explore patterns in patient's relationships and discuss options for new behaviors.    Explore patterns in patient's actions and choices and discuss options for new behaviors.    Cognitive-behavioral Therapy    Discuss common cognitive distortions, identify them in patient's life.    Explore ways to challenge, replace, and act against these cognitions.    Acceptance and Commitment Therapy    Explore and identify important  values in patient's life.    Discuss ways to commit to behavioral activation around these values.    Psychodynamic psychotherapy    Discuss patient's emotional dynamics and issues and how they impact behaviors.    Explore patient's history of relationships and how they impact present behaviors.    Explore how to work with and make changes in these schemas and patterns.    Narrative Therapy    Explore the patient's story of his/her life from his/her perspective.    Explore alternate ways of understanding their experience, identifying exceptions, developing new themes.    Interpersonal Psychotherapy    Explore patterns in relationships that are effective or ineffective at helping patient reach their goals, find satisfying experience.    Discuss new patterns or behaviors to engage in for improved social functioning.    Behavioral Activation    Discuss steps patient can take to become more involved in meaningful activity.    Identify barriers to these activities and explore possible solutions.    Mindfulness-Based Strategies    Discuss skills based on development and application of mindfulness.    Skills drawn from compassion-focused therapy, dialectical behavior therapy, mindfulness-based stress reduction, mindfulness-based cognitive therapy, etc.      Patient has reviewed and agreed to the above plan.      Edmundo Hansen Psy.D, LP   Behavioral Health Clinician   -[facility]     9/12/2022

## 2022-11-07 NOTE — LETTER
"2022       RE: Shadia Mcgrath  [id number] [facility] [id number]     Dear Colleague,    Thank you for referring your patient, Shadia Mcgrath, to the [facility] at [facility]. Please see a copy of my visit note below.    [facility] Integrated [facility]al Health   2022        Behavioral Health Clinician Progress Note    Patient Name: Shadia Mcgrath           Service Type: Phone Visit      Service Location:  Phone Visit      Session Start Time: 10:05  Session End Time: 10:56      Session Length: 38 - 52      Attendees: Patient     Visit Activities (Refresh list every visit): Phone Encounter    Service Modality:  Phone Visit:      Provider verified identity through the following two step process.  Patient provided:  Patient  and Patient is known previously to provider    The patient has been notified of the following:      \"We have found that certain health care needs can be provided without the need for a face to face visit.  This service lets us provide the care you need with a phone conversation.       I will have full access to your [facility] medical record during this entire phone call.   I will be taking notes for your medical record.      Since this is like an office visit, we will bill your insurance company for this service.       There are potential benefits and risks of telephone visits (e.g. limits to patient confidentiality) that differ from in-person visits.?Confidentiality still applies for telephone services, and nobody will record the visit.  It is important to be in a quiet, private space that is free of distractions (including cell phone or other devices) during the visit.??      If during the course of the call I believe a telephone visit is not appropriate, you will not be charged for this service\"     Consent " has been obtained for this service by care team member: Yes       Diagnostic Assessment Date: 10/5/2020  Treatment Plan Review Date: 12/12/2022  See Flowsheets for today's PHQ-9 and ZACH-7 results  Previous PHQ-9:   PHQ-9 SCORE 8/27/2020   PHQ-9 Total Score 13     Previous ZACH-7: No flowsheet data found.       DATA  Extended Session (60+ minutes): No  Interactive Complexity: No  Crisis: No    Treatment Objective(s) Addressed in This Session:  Target Behavior(s): disease management/lifestyle changes related to psychosocial and relational stress    Depressed Mood: Increase interest, engagement, and pleasure in doing things  Decrease frequency and intensity of feeling down, depressed, hopeless  Identify negative self-talk and behaviors: challenge core beliefs, myths, and actions  Relationship Problems: will address relationship difficulties in a more adaptive manner       Current Stressors / Issues:  [facility] met with Shadia today over the phone to continue supporting her treatment of depression and reported difficulties with anger and relationship problems. We discussed the following topics today:    Reports ongoing difficulties with depressed mood. Per her recent neurology visit, Imani states she was suspected of experiencing dissociative identity disorder (DID) in the absence of data suggesting she experiences seizures. Long discussion today about DID, its relationship to trauma, and how it can manifest in daily experiences. Imani notes she has names for her various personalities (e.g. Shadia, who is angry/mean), though after discussing this further, her report sounds more consistent with naming various personality traits, rather than experiencing distinct personality identities with a corresponding lapse in consciousness. She denied each personality seeming to have its own autobiographical information as well, which also does not suggest DID.     [facility] recommended Imani consult with psychiatry and her PCP. She will get a  referral from her PCP who works for [employer]. She as also recommended to complete psychological testing to help clarify diagnosis. Depression and anxiety themes are evident, though there is some evidence to suggest subthreshold PTSD with a possible dissociative element due to her history of childhood trauma. BPD might also be a factor worth considering. PTSD can include dissociation, which is distinctive of DID as a disorder, though testing could help clarify this further. Referral placed.     Imani denied SI today with plans, means, or intent. She reports a few episodes of thoughts involving being better off dead, though denied these thoughts lasting long and has no concern about her safety. She cites her son as a protective factor.     Progress on Treatment Objective(s) / Homework:  Satisfactory progress - ACTION (Actively working towards change); Intervened by reinforcing change plan / affirming steps taken    Care Plan review completed: No     Psycho-education regarding mental health diagnoses and treatment options    Supportive Therapy    Provide affirmations, reflections, and establish working rapport    Emphasize and reflect on strength of therapeutic relationship    Skills training    Explore skills useful to client in current situation.    Skills include assertiveness, communication, conflict management, problem-solving, relaxation, etc.    Solution-Focused Therapy    Explore patterns in patient's relationships and discuss options for new behaviors.    Explore patterns in patient's actions and choices and discuss options for new behaviors.      Medication Review:  No changes to current psychiatric medication(s)    Medication Compliance:  Yes    Changes in Health Issues:   None reported    Chemical Use Review:   Substance Use: Chemical use reviewed, no active concerns identified      Tobacco Use: No current tobacco use.      Assessment: Current Emotional / Mental Status (status of significant symptoms):  Risk  status (Self / Other harm or suicidal ideation)  Patient has had a history of suicidal ideation: SI as a youth only and suicide attempts: one prior attempt at age 15; none recent  Patient denies current fears or concerns for personal safety.     Patient denies current or recent suicidal ideation or behaviors.  Patient denies current or recent homicidal ideation or behaviors.  Patient denies current or recent self injurious behavior or ideation.  Patient denies other safety concerns.     A safety and risk management plan has not been developed at this time, however patient was encouraged to call [place] [phone] should there be a change in any of these risk factors.    [place] Suicide Severity Rating Scale (Lifetime/Recent)  [place] Suicide Severity Rating (Lifetime/Recent) 9/21/2020   Wish to be Dead (Lifetime) Yes   Non-Specific Active Suicidal Thoughts (Lifetime) Yes   Most Severe Ideation Rating (Lifetime) 4   Frequency (Lifetime) 4   Duration (Lifetime) 4   Controllability (Lifetime) 3   Protective Factors  (Lifetime) 2   Reasons for Ideation (Lifetime) 4   RETIRED: 1. Wish to be Dead (Recent) No   RETIRED: 2. Non-Specific Active Suicidal Thoughts (Recent) No   3. Active Suicidal Ideation with any Methods (Not Plan) Without Intent to Act (Lifetime) Yes   RETIRED: 3. Active Suicidal Ideation with any Methods (Not Plan) Without Intent to Act (Recent) No   RETIRE: 4. Active Suicidal Ideation with Some Intent to Act, Without Specific Plan (Lifetime) Yes   4. Active Suicidal Ideation with Some Intent to Act, Without Specific Plan (Recent) No   RETIRE: 5. Active Suicidal Ideation with Specific Plan and Intent (Lifetime) Yes   RETIRED: 5. Active Suicidal Ideation with Specific Plan and Intent (Recent) No   Most Severe Ideation Rating (Past Month) NA   Frequency (Past Month) NA   Duration (Past Month) NA   Controllability (Past Month) NA   Protective Factors (Past Month) NA   Reasons for Ideation (Past Month) NA    Actual Attempt (Lifetime) Yes   Actual Attempt (Past 3 Months) No   Has subject engaged in non-suicidal self-injurious behavior? (Lifetime) No   Has subject engaged in non-suicidal self-injurious behavior? (Past 3 Months) No   Interrupted Attempts (Lifetime) No   Interrupted Attempts (Past 3 Months) No   Aborted or Self-Interrupted Attempt (Lifetime) No   Aborted or Self-Interrupted Attempt (Past 3 Months) No   Preparatory Acts or Behavior (Lifetime) No   Preparatory Acts or Behavior (Past 3 Months) No   Most Recent Attempt Actual Lethality Code 2   Most Lethal Attempt Actual Lethality Code 2   Initial/First Attempt Actual Lethality Code 2       Appearance:   Unable to assess   Eye Contact:   Unable to assess   Psychomotor Behavior: Unable to assess   Attitude:   Cooperative  Friendly Pleasant  Orientation:   All  Speech   Rate / Production: Normal/ Responsive   Volume:  Normal   Mood:    Depressed ; improving  Affect:    Lethargic   Thought Content:  Clear   Thought Form:  Coherent  Logical   Insight:    Good     Diagnoses:  1. Major depressive disorder, recurrent episode, mild (H)      R/O Dissociative Identity Disorder  R/O PTSD with dissociative features   R/O Borderline Personality Disorder    Collateral Reports Completed:   Not Applicable    Plan: (Homework, other):  Shadia was given information about behavioral services and encouraged to schedule a follow up appointment with the clinic [facility] [place] in 1 month.  Referred for psychological testing. CD Recommendations: No indications of CD issues.     Edmundo Hansen Psy.D, LP   Behavioral Health Clinician   [facility]       ______________________________________________________________________                                            Individual Treatment Plan    Patient's Name: Shadia Mcgrath  YOB: 1980    Date of Creation: 3/8/2022  Date Treatment Plan Last Reviewed/Revised:   9/12/2022    DSM5 Diagnoses: 296.31  (F33.0) Major Depressive Disorder, Recurrent Episode, Mild _  Psychosocial / Contextual Factors: Hx of difficulties with anger  PROMIS (reviewed every 90 days): not completed by pt    Referral / Collaboration:  Referral to another professional/service is not indicated at this time..    Anticipated number of session for this episode of care: 6-8  Anticipation frequency of session: Every other week  Anticipated Duration of each session: 38-52 minutes  Treatment plan will be reviewed in 90 days or when goals have been changed.       MeasurableTreatment Goal(s) related to diagnosis / functional impairment(s)  Goal 1: Patient will experience a reduction in depressive symptoms along with a corresponding increase in positive emotion and life satisfaction.    I will know I've met my goal when I dont feel as down all the time.      Objective #A (Patient Action)    Patient will Increase interest, engagement, and pleasure in doing things.  Status: Continued - Date(s):  9/12/2022    Intervention(s)  Therapist will help patient identify pleasant and mastery oriented events that elicit positive, relaxed mood.    Objective #B  Patient will Decrease frequency and intensity of feeling down, depressed, hopeless.  Status: Continued - Date(s):  9/12/2022    Intervention(s)  Therapist will introduce patient to cognitive-behavioral and acceptance and commitment therapy topics aimed to help reduce depression and anxiety    Objective #C  Patient will Identify negative self-talk and behaviors: challenge core beliefs, myths, and actions  Improve concentration, focus, and mindfulness in daily activities .  Status: Continued - Date(s):  9/12/2022    Intervention(s)  Therapist will help patient identify and manage negative self-talk and automatic thoughts; introduce patient to cognitive distortions; help patient develop cognitive diffusion techniques      Goal 2: Patiient will learn adaptive ways to successfully manage conflict and difficulties  with anger while also experiencing a corresponding improvement in relationship satisfaction    I will know I've met my goal when I don't get so angry.      Objective #A (Patient Action)    Status: Continued - Date(s):  9/12/2022    Patient will identify patterns of escalation  (i.e. tightness in chest, flushed face, increased heart rate, clenched hands, etc.).    Intervention(s)  Therapist will help patient identify physical and cognitive factors associated with anger to improve mindfulness.    Objective #B  Patient will use progressive relaxation exercise once in the morning and once in the evening to help relieve tension  practice deep diaphragm breathing once daily for at least 5 minutes to reduce anger / irritability and regain a calmer, more clear state of mind.    Status: Continued - Date(s):  9/12/2022    Intervention(s)  Therapist will help patient identify breathing and relaxation techniques to help manage anger.    Objective #C  Patient will identify at least 2-3 alternative response(s) to aggressive behaviors  identify at least 2-3 techniques for intervening on the escalation.  Status: Continued - Date(s):  9/12/2022    Intervention(s)  Therapist will help patient identify alternative, adaptive responses to anger and aggressive behaviors toward others and ways to successfuly de-escalate inteprersonal situations where conflict occurs.    Other Possible Therapeutic Intervention(s):    Psycho-education regarding mental health diagnoses and treatment options    Supportive Therapy    Provide affirmations, reflections, and establish working rapport    Emphasize and reflect on strength of therapeutic relationship    Skills training    Explore skills useful to client in current situation.    Skills include assertiveness, communication, conflict management, problem-solving, relaxation, etc.    Solution-Focused Therapy    Explore patterns in patient's relationships and discuss options for new behaviors.    Explore  patterns in patient's actions and choices and discuss options for new behaviors.    Cognitive-behavioral Therapy    Discuss common cognitive distortions, identify them in patient's life.    Explore ways to challenge, replace, and act against these cognitions.    Acceptance and Commitment Therapy    Explore and identify important values in patient's life.    Discuss ways to commit to behavioral activation around these values.    Psychodynamic psychotherapy    Discuss patient's emotional dynamics and issues and how they impact behaviors.    Explore patient's history of relationships and how they impact present behaviors.    Explore how to work with and make changes in these schemas and patterns.    Narrative Therapy    Explore the patient's story of his/her life from his/her perspective.    Explore alternate ways of understanding their experience, identifying exceptions, developing new themes.    Interpersonal Psychotherapy    Explore patterns in relationships that are effective or ineffective at helping patient reach their goals, find satisfying experience.    Discuss new patterns or behaviors to engage in for improved social functioning.    Behavioral Activation    Discuss steps patient can take to become more involved in meaningful activity.    Identify barriers to these activities and explore possible solutions.    Mindfulness-Based Strategies    Discuss skills based on development and application of mindfulness.    Skills drawn from compassion-focused therapy, dialectical behavior therapy, mindfulness-based stress reduction, mindfulness-based cognitive therapy, etc.      Patient has reviewed and agreed to the above plan.      9/12/2022      Again, thank you for allowing me to participate in the care of your patient.      Sincerely,    Edmundo Hansen

## 2022-11-22 ENCOUNTER — VIRTUAL VISIT (OUTPATIENT)
Dept: BEHAVIORAL HEALTH | Facility: CLINIC | Age: 42
End: 2022-11-22
Payer: COMMERCIAL

## 2022-11-22 DIAGNOSIS — F33.0 MAJOR DEPRESSIVE DISORDER, RECURRENT EPISODE, MILD (H): Primary | ICD-10-CM

## 2022-11-22 PROCEDURE — 90832 PSYTX W PT 30 MINUTES: CPT | Mod: 95 | Performed by: PSYCHOLOGIST

## 2022-11-22 NOTE — PROGRESS NOTES
"[facility]: Integrated Behavioral Health   2022        Behavioral Health Clinician Progress Note    Patient Name: Shadia Mcgrath           Service Type: Phone Visit      Service Location:  Phone Visit      Session Start Time: 11:20  Session End Time: 11:44      Session Length: 16 - 37      Attendees: Patient     Visit Activities (Refresh list every visit): Phone Encounter    Service Modality:  Phone Visit:      Provider verified identity through the following two step process.  Patient provided:  Patient  and Patient is known previously to provider    The patient has been notified of the following:      \"We have found that certain health care needs can be provided without the need for a face to face visit.  This service lets us provide the care you need with a phone conversation.       I will have full access to your [facility] medical record during this entire phone call.   I will be taking notes for your medical record.      Since this is like an office visit, we will bill your insurance company for this service.       There are potential benefits and risks of telephone visits (e.g. limits to patient confidentiality) that differ from in-person visits.?Confidentiality still applies for telephone services, and nobody will record the visit.  It is important to be in a quiet, private space that is free of distractions (including cell phone or other devices) during the visit.??      If during the course of the call I believe a telephone visit is not appropriate, you will not be charged for this service\"     Consent has been obtained for this service by care team member: Yes       Diagnostic Assessment Date: 10/5/2020  Treatment Plan Review Date: 2022  See Flowsheets for today's PHQ-9 and ZACH-7 results  Previous PHQ-9:   PHQ-9 SCORE 2020   PHQ-9 Total Score 13     Previous ZACH-7: No flowsheet data found.       DATA  Extended Session (60+ " minutes): No  Interactive Complexity: No  Crisis: No    Treatment Objective(s) Addressed in This Session:  Target Behavior(s): disease management/lifestyle changes related to psychosocial and relational stress    Depressed Mood: Increase interest, engagement, and pleasure in doing things  Decrease frequency and intensity of feeling down, depressed, hopeless  Identify negative self-talk and behaviors: challenge core beliefs, myths, and actions       Current Stressors / Issues:  [facility] met with Shadia today over the phone to continue supporting her treatment of depression and reported difficulties with anger and relationship problems. We discussed the following topics today:    Brief session. Reports improvement with mood over the last few weeks. Attributes this to positive interactions with her son, her getting a new job which is going well per her report, and feeling good about how she handled a few recent stressful situations. Imani states she was able to set up an appointment to have her suspected concern of DID being evaluated further, per this writer's recommendation (appears this is outside [facility] system). Discussed her holiday plans and continued to provide supportive therapy/reinforced the adaptive steps she has taken toward change. She denied active concerns to discuss today. Denies risk concerns.     Progress on Treatment Objective(s) / Homework:  Satisfactory progress - ACTION (Actively working towards change); Intervened by reinforcing change plan / affirming steps taken    Care Plan review completed: No     Psycho-education regarding mental health diagnoses and treatment options    Supportive Therapy    Provide affirmations, reflections, and establish working rapport    Emphasize and reflect on strength of therapeutic relationship    Skills training    Explore skills useful to client in current situation.    Skills include assertiveness, communication, conflict management, problem-solving, relaxation,  etc.    Solution-Focused Therapy    Explore patterns in patient's relationships and discuss options for new behaviors.    Explore patterns in patient's actions and choices and discuss options for new behaviors.      Medication Review:  No changes to current psychiatric medication(s)    Medication Compliance:  Yes    Changes in Health Issues:   None reported    Chemical Use Review:   Substance Use: Chemical use reviewed, no active concerns identified      Tobacco Use: No current tobacco use.      Assessment: Current Emotional / Mental Status (status of significant symptoms):  Risk status (Self / Other harm or suicidal ideation)  Patient has had a history of suicidal ideation: SI as a youth only and suicide attempts: one prior attempt at age 15; none recent  Patient denies current fears or concerns for personal safety.     Patient denies current or recent suicidal ideation or behaviors.  Patient denies current or recent homicidal ideation or behaviors.  Patient denies current or recent self injurious behavior or ideation.  Patient denies other safety concerns.     A safety and risk management plan has not been developed at this time, however patient was encouraged to call [place] [phone] should there be a change in any of these risk factors.    Power Suicide Severity Rating Scale (Lifetime/Recent)  Power Suicide Severity Rating (Lifetime/Recent) 9/21/2020   Wish to be Dead (Lifetime) Yes   Non-Specific Active Suicidal Thoughts (Lifetime) Yes   Most Severe Ideation Rating (Lifetime) 4   Frequency (Lifetime) 4   Duration (Lifetime) 4   Controllability (Lifetime) 3   Protective Factors  (Lifetime) 2   Reasons for Ideation (Lifetime) 4   RETIRED: 1. Wish to be Dead (Recent) No   RETIRED: 2. Non-Specific Active Suicidal Thoughts (Recent) No   3. Active Suicidal Ideation with any Methods (Not Plan) Without Intent to Act (Lifetime) Yes   RETIRED: 3. Active Suicidal Ideation with any Methods (Not Plan) Without  Intent to Act (Recent) No   RETIRE: 4. Active Suicidal Ideation with Some Intent to Act, Without Specific Plan (Lifetime) Yes   4. Active Suicidal Ideation with Some Intent to Act, Without Specific Plan (Recent) No   RETIRE: 5. Active Suicidal Ideation with Specific Plan and Intent (Lifetime) Yes   RETIRED: 5. Active Suicidal Ideation with Specific Plan and Intent (Recent) No   Most Severe Ideation Rating (Past Month) NA   Frequency (Past Month) NA   Duration (Past Month) NA   Controllability (Past Month) NA   Protective Factors (Past Month) NA   Reasons for Ideation (Past Month) NA   Actual Attempt (Lifetime) Yes   Actual Attempt (Past 3 Months) No   Has subject engaged in non-suicidal self-injurious behavior? (Lifetime) No   Has subject engaged in non-suicidal self-injurious behavior? (Past 3 Months) No   Interrupted Attempts (Lifetime) No   Interrupted Attempts (Past 3 Months) No   Aborted or Self-Interrupted Attempt (Lifetime) No   Aborted or Self-Interrupted Attempt (Past 3 Months) No   Preparatory Acts or Behavior (Lifetime) No   Preparatory Acts or Behavior (Past 3 Months) No   Most Recent Attempt Actual Lethality Code 2   Most Lethal Attempt Actual Lethality Code 2   Initial/First Attempt Actual Lethality Code 2       Appearance:   Unable to assess   Eye Contact:   Unable to assess   Psychomotor Behavior: Unable to assess   Attitude:   Cooperative  Friendly Pleasant  Orientation:   All  Speech   Rate / Production: Normal/ Responsive   Volume:  Normal   Mood:    Depressed ; improving  Affect:    Lethargic   Thought Content:  Clear   Thought Form:  Coherent  Logical   Insight:    Good     Diagnoses:  1. Major depressive disorder, recurrent episode, mild (H)      R/O Dissociative Identity Disorder  R/O PTSD with dissociative features   R/O Borderline Personality Disorder    Collateral Reports Completed:   Not Applicable    Plan: (Homework, other):  Shadia was given information about behavioral services and  encouraged to schedule a follow up appointment with the clinic [facility] in 3 weeks. Complete psychological testing. CD Recommendations: No indications of CD issues.     Edmundo Hansen Psy.D, LP   Behavioral Health Clinician   [facility] [place]       ______________________________________________________________________                                            Individual Treatment Plan    Patient's Name: Shadia Mcgrath  YOB: 1980    Date of Creation: 3/8/2022  Date Treatment Plan Last Reviewed/Revised:   9/12/2022    DSM5 Diagnoses: 296.31 (F33.0) Major Depressive Disorder, Recurrent Episode, Mild _  Psychosocial / Contextual Factors: Hx of difficulties with anger  PROMIS (reviewed every 90 days): not completed by pt    Referral / Collaboration:  Referral to another professional/service is not indicated at this time..    Anticipated number of session for this episode of care: 6-8  Anticipation frequency of session: Every other week  Anticipated Duration of each session: 38-52 minutes  Treatment plan will be reviewed in 90 days or when goals have been changed.       MeasurableTreatment Goal(s) related to diagnosis / functional impairment(s)  Goal 1: Patient will experience a reduction in depressive symptoms along with a corresponding increase in positive emotion and life satisfaction.    I will know I've met my goal when I dont feel as down all the time.      Objective #A (Patient Action)    Patient will Increase interest, engagement, and pleasure in doing things.  Status: Continued - Date(s):  9/12/2022    Intervention(s)  Therapist will help patient identify pleasant and mastery oriented events that elicit positive, relaxed mood.    Objective #B  Patient will Decrease frequency and intensity of feeling down, depressed, hopeless.  Status: Continued - Date(s):  9/12/2022    Intervention(s)  Therapist will introduce patient to cognitive-behavioral and acceptance and  commitment therapy topics aimed to help reduce depression and anxiety    Objective #C  Patient will Identify negative self-talk and behaviors: challenge core beliefs, myths, and actions  Improve concentration, focus, and mindfulness in daily activities .  Status: Continued - Date(s):  9/12/2022    Intervention(s)  Therapist will help patient identify and manage negative self-talk and automatic thoughts; introduce patient to cognitive distortions; help patient develop cognitive diffusion techniques      Goal 2: Patiient will learn adaptive ways to successfully manage conflict and difficulties with anger while also experiencing a corresponding improvement in relationship satisfaction    I will know I've met my goal when I don't get so angry.      Objective #A (Patient Action)    Status: Continued - Date(s):  9/12/2022    Patient will identify patterns of escalation  (i.e. tightness in chest, flushed face, increased heart rate, clenched hands, etc.).    Intervention(s)  Therapist will help patient identify physical and cognitive factors associated with anger to improve mindfulness.    Objective #B  Patient will use progressive relaxation exercise once in the morning and once in the evening to help relieve tension  practice deep diaphragm breathing once daily for at least 5 minutes to reduce anger / irritability and regain a calmer, more clear state of mind.    Status: Continued - Date(s):  9/12/2022    Intervention(s)  Therapist will help patient identify breathing and relaxation techniques to help manage anger.    Objective #C  Patient will identify at least 2-3 alternative response(s) to aggressive behaviors  identify at least 2-3 techniques for intervening on the escalation.  Status: Continued - Date(s):  9/12/2022    Intervention(s)  Therapist will help patient identify alternative, adaptive responses to anger and aggressive behaviors toward others and ways to successfuly de-escalate inteprersonal situations where  conflict occurs.    Other Possible Therapeutic Intervention(s):    Psycho-education regarding mental health diagnoses and treatment options    Supportive Therapy    Provide affirmations, reflections, and establish working rapport    Emphasize and reflect on strength of therapeutic relationship    Skills training    Explore skills useful to client in current situation.    Skills include assertiveness, communication, conflict management, problem-solving, relaxation, etc.    Solution-Focused Therapy    Explore patterns in patient's relationships and discuss options for new behaviors.    Explore patterns in patient's actions and choices and discuss options for new behaviors.    Cognitive-behavioral Therapy    Discuss common cognitive distortions, identify them in patient's life.    Explore ways to challenge, replace, and act against these cognitions.    Acceptance and Commitment Therapy    Explore and identify important values in patient's life.    Discuss ways to commit to behavioral activation around these values.    Psychodynamic psychotherapy    Discuss patient's emotional dynamics and issues and how they impact behaviors.    Explore patient's history of relationships and how they impact present behaviors.    Explore how to work with and make changes in these schemas and patterns.    Narrative Therapy    Explore the patient's story of his/her life from his/her perspective.    Explore alternate ways of understanding their experience, identifying exceptions, developing new themes.    Interpersonal Psychotherapy    Explore patterns in relationships that are effective or ineffective at helping patient reach their goals, find satisfying experience.    Discuss new patterns or behaviors to engage in for improved social functioning.    Behavioral Activation    Discuss steps patient can take to become more involved in meaningful activity.    Identify barriers to these activities and explore possible  solutions.    Mindfulness-Based Strategies    Discuss skills based on development and application of mindfulness.    Skills drawn from compassion-focused therapy, dialectical behavior therapy, mindfulness-based stress reduction, mindfulness-based cognitive therapy, etc.      Patient has reviewed and agreed to the above plan.      Edmundo Hansen Psy.D, LP   Behavioral Health Clinician   -[facility]     9/12/2022

## 2022-12-12 ENCOUNTER — VIRTUAL VISIT (OUTPATIENT)
Dept: BEHAVIORAL HEALTH | Facility: CLINIC | Age: 42
End: 2022-12-12
Payer: COMMERCIAL

## 2022-12-12 DIAGNOSIS — Z53.9 ERRONEOUS ENCOUNTER--DISREGARD: Primary | ICD-10-CM

## 2022-12-12 NOTE — PROGRESS NOTES
Appointment cancelled. Patient called into work, had no cell reception to complete appointment. Rescheduled for 12/20/22 at 10:00

## 2022-12-20 ENCOUNTER — VIRTUAL VISIT (OUTPATIENT)
Dept: BEHAVIORAL HEALTH | Facility: CLINIC | Age: 42
End: 2022-12-20
Payer: COMMERCIAL

## 2022-12-20 DIAGNOSIS — F33.0 MAJOR DEPRESSIVE DISORDER, RECURRENT EPISODE, MILD (H): Primary | ICD-10-CM

## 2022-12-20 PROCEDURE — 90834 PSYTX W PT 45 MINUTES: CPT | Mod: 95 | Performed by: PSYCHOLOGIST

## 2022-12-20 NOTE — PROGRESS NOTES
"[facility]: Integrated Behavioral Health   2022        Behavioral Health Clinician Progress Note    Patient Name: Shadia Mcgrath           Service Type: Phone Visit      Service Location:  Phone Visit      Session Start Time: 10:00  Session End Time: 10:46      Session Length: 38 - 52      Attendees: Patient     Visit Activities (Refresh list every visit): Phone Encounter    Service Modality:  Phone Visit:      Provider verified identity through the following two step process.  Patient provided:  Patient  and Patient is known previously to provider    The patient has been notified of the following:      \"We have found that certain health care needs can be provided without the need for a face to face visit.  This service lets us provide the care you need with a phone conversation.       I will have full access to your [facility] medical record during this entire phone call.   I will be taking notes for your medical record.      Since this is like an office visit, we will bill your insurance company for this service.       There are potential benefits and risks of telephone visits (e.g. limits to patient confidentiality) that differ from in-person visits.?Confidentiality still applies for telephone services, and nobody will record the visit.  It is important to be in a quiet, private space that is free of distractions (including cell phone or other devices) during the visit.??      If during the course of the call I believe a telephone visit is not appropriate, you will not be charged for this service\"     Consent has been obtained for this service by care team member: Yes       Diagnostic Assessment Date: 10/5/2020  Treatment Plan Review Date: 3/20/2023  See Flowsheets for today's PHQ-9 and ZACH-7 results  Previous PHQ-9:   PHQ-9 SCORE 2020   PHQ-9 Total Score 13     Previous ZACH-7: No flowsheet data found.       DATA  Extended Session (60+ minutes): " No  Interactive Complexity: No  Crisis: No    Treatment Objective(s) Addressed in This Session:  Target Behavior(s): disease management/lifestyle changes related to psychosocial and relational stress    Depressed Mood: Increase interest, engagement, and pleasure in doing things  Decrease frequency and intensity of feeling down, depressed, hopeless  Identify negative self-talk and behaviors: challenge core beliefs, myths, and actions  Anger Management: will learn strategies to resolve conflict adaptively and will learn and practice positive anger management skills        Current Stressors / Issues:  [facility] met with Shadia today over the phone to continue supporting her treatment of depression and reported difficulties with anger and relationship problems. We discussed the following topics today:    Doing well overall, she reports. States feeling upset that her previous employer allegedly filed a restraining order against her, though she found out this was a lie. She briefly shared the status of the ongoing legal case she has against her former employer. She fortunately reports doing well at her current job, working at a [employer]. She shared what she enjoys about her current job and we discussed how she is doing with managing stress and anger. Explored a few successes in managing tense interpersonal situations, how she utilized opposite action when feeling upset (I.e. reacting calmly and politely when upset). Discussed how she actually feels empowered by these successes, how she feels grateful she can leave them behind, rather than regret how she acted. [facility] provided support and affirmed the adaptive steps she has taken toward successful anger management.     Ended session with updating treatment plan.     Progress on Treatment Objective(s) / Homework:  Satisfactory progress - ACTION (Actively working towards change); Intervened by reinforcing change plan / affirming steps taken    Care Plan review completed: Yes      Psycho-education regarding mental health diagnoses and treatment options    Supportive Therapy    Provide affirmations, reflections, and establish working rapport    Emphasize and reflect on strength of therapeutic relationship    Skills training    Explore skills useful to client in current situation.    Skills include assertiveness, communication, conflict management, problem-solving, relaxation, etc.    Solution-Focused Therapy    Explore patterns in patient's relationships and discuss options for new behaviors.    Explore patterns in patient's actions and choices and discuss options for new behaviors.      Medication Review:  No changes to current psychiatric medication(s)    Medication Compliance:  Yes    Changes in Health Issues:   None reported    Chemical Use Review:   Substance Use: Chemical use reviewed, no active concerns identified      Tobacco Use: No current tobacco use.      Assessment: Current Emotional / Mental Status (status of significant symptoms):  Risk status (Self / Other harm or suicidal ideation)  Patient has had a history of suicidal ideation: SI as a youth only and suicide attempts: one prior attempt at age 15; none recent  Patient denies current fears or concerns for personal safety.     Patient denies current or recent suicidal ideation or behaviors.  Patient denies current or recent homicidal ideation or behaviors.  Patient denies current or recent self injurious behavior or ideation.  Patient denies other safety concerns.     A safety and risk management plan has not been developed at this time, however patient was encouraged to call [place] [phone] should there be a change in any of these risk factors.    [place] Suicide Severity Rating Scale (Lifetime/Recent)  [place] Suicide Severity Rating (Lifetime/Recent) 9/21/2020   Wish to be Dead (Lifetime) Yes   Non-Specific Active Suicidal Thoughts (Lifetime) Yes   Most Severe Ideation Rating (Lifetime) 4   Frequency (Lifetime) 4    Duration (Lifetime) 4   Controllability (Lifetime) 3   Protective Factors  (Lifetime) 2   Reasons for Ideation (Lifetime) 4   RETIRED: 1. Wish to be Dead (Recent) No   RETIRED: 2. Non-Specific Active Suicidal Thoughts (Recent) No   3. Active Suicidal Ideation with any Methods (Not Plan) Without Intent to Act (Lifetime) Yes   RETIRED: 3. Active Suicidal Ideation with any Methods (Not Plan) Without Intent to Act (Recent) No   RETIRE: 4. Active Suicidal Ideation with Some Intent to Act, Without Specific Plan (Lifetime) Yes   4. Active Suicidal Ideation with Some Intent to Act, Without Specific Plan (Recent) No   RETIRE: 5. Active Suicidal Ideation with Specific Plan and Intent (Lifetime) Yes   RETIRED: 5. Active Suicidal Ideation with Specific Plan and Intent (Recent) No   Most Severe Ideation Rating (Past Month) NA   Frequency (Past Month) NA   Duration (Past Month) NA   Controllability (Past Month) NA   Protective Factors (Past Month) NA   Reasons for Ideation (Past Month) NA   Actual Attempt (Lifetime) Yes   Actual Attempt (Past 3 Months) No   Has subject engaged in non-suicidal self-injurious behavior? (Lifetime) No   Has subject engaged in non-suicidal self-injurious behavior? (Past 3 Months) No   Interrupted Attempts (Lifetime) No   Interrupted Attempts (Past 3 Months) No   Aborted or Self-Interrupted Attempt (Lifetime) No   Aborted or Self-Interrupted Attempt (Past 3 Months) No   Preparatory Acts or Behavior (Lifetime) No   Preparatory Acts or Behavior (Past 3 Months) No   Most Recent Attempt Actual Lethality Code 2   Most Lethal Attempt Actual Lethality Code 2   Initial/First Attempt Actual Lethality Code 2       Appearance:   Unable to assess   Eye Contact:   Unable to assess   Psychomotor Behavior: Unable to assess   Attitude:   Cooperative  Friendly Pleasant  Orientation:   All  Speech   Rate / Production: Normal/ Responsive   Volume:  Normal   Mood:    Depressed ; improving  Affect:    Lethargic   Thought  Content:  Clear   Thought Form:  Coherent  Logical   Insight:    Good     Diagnoses:  1. Major depressive disorder, recurrent episode, mild (H)      R/O Dissociative Identity Disorder  R/O PTSD with dissociative features   R/O Borderline Personality Disorder    Collateral Reports Completed:   Not Applicable    Plan: (Homework, other):  Shadia was given information about behavioral services and encouraged to schedule a follow up appointment with the clinic [facility] in 3 weeks. Additional strategies to address anger/interpersonal conflict provided today. CD Recommendations: No indications of CD issues.     Edmundo Hansen Psy.D, LP   Behavioral Health Clinician   [facility]     December 20, 2022      ______________________________________________________________________                                            Individual Treatment Plan    Patient's Name: Shadia Mcgrath  YOB: 1980    Date of Creation: 3/8/2022  Date Treatment Plan Last Reviewed/Revised:   12/20/2023    DSM5 Diagnoses: 296.31 (F33.0) Major Depressive Disorder, Recurrent Episode, Mild _  Psychosocial / Contextual Factors: Hx of difficulties with anger  PROMIS (reviewed every 90 days): not completed by pt    Referral / Collaboration:  Referral to another professional/service is not indicated at this time..    Anticipated number of session for this episode of care: 6-8  Anticipation frequency of session: Every other week  Anticipated Duration of each session: 38-52 minutes  Treatment plan will be reviewed in 90 days or when goals have been changed.       MeasurableTreatment Goal(s) related to diagnosis / functional impairment(s)  Goal 1: Patient will experience a reduction in depressive symptoms along with a corresponding increase in positive emotion and life satisfaction.    I will know I've met my goal when I dont feel as down all the time.      Objective #A (Patient Action)    Patient will Increase  interest, engagement, and pleasure in doing things.  Status: Continued - Date(s):  COMPLETE    Intervention(s)  Therapist will help patient identify pleasant and mastery oriented events that elicit positive, relaxed mood.    Objective #B  Patient will Decrease frequency and intensity of feeling down, depressed, hopeless.  Status: Continued - Date(s):  COMPLETE    Intervention(s)  Therapist will introduce patient to cognitive-behavioral and acceptance and commitment therapy topics aimed to help reduce depression and anxiety    Objective #C  Patient will Identify negative self-talk and behaviors: challenge core beliefs, myths, and actions  Improve concentration, focus, and mindfulness in daily activities .  Status: Continued - Date(s):  12/20/2023    Intervention(s)  Therapist will help patient identify and manage negative self-talk and automatic thoughts; introduce patient to cognitive distortions; help patient develop cognitive diffusion techniques      Goal 2: Patiient will learn adaptive ways to successfully manage conflict and difficulties with anger while also experiencing a corresponding improvement in relationship satisfaction    I will know I've met my goal when I don't get so angry.      Objective #A (Patient Action)    Status: Continued - Date(s):  COMPLETE    Patient will identify patterns of escalation  (i.e. tightness in chest, flushed face, increased heart rate, clenched hands, etc.).    Intervention(s)  Therapist will help patient identify physical and cognitive factors associated with anger to improve mindfulness.    Objective #B  Patient will use progressive relaxation exercise once in the morning and once in the evening to help relieve tension  practice deep diaphragm breathing once daily for at least 5 minutes to reduce anger / irritability and regain a calmer, more clear state of mind.    Status: Continued - Date(s):  COMPLETE    Intervention(s)  Therapist will help patient identify breathing and  relaxation techniques to help manage anger.    Objective #C  Patient will identify at least 2-3 alternative response(s) to aggressive behaviors  identify at least 2-3 techniques for intervening on the escalation.  Status: Continued - Date(s):  12/20/2023    Intervention(s)  Therapist will help patient identify alternative, adaptive responses to anger and aggressive behaviors toward others and ways to successfuly de-escalate inteprersonal situations where conflict occurs.    Other Possible Therapeutic Intervention(s):    Psycho-education regarding mental health diagnoses and treatment options    Supportive Therapy    Provide affirmations, reflections, and establish working rapport    Emphasize and reflect on strength of therapeutic relationship    Skills training    Explore skills useful to client in current situation.    Skills include assertiveness, communication, conflict management, problem-solving, relaxation, etc.    Solution-Focused Therapy    Explore patterns in patient's relationships and discuss options for new behaviors.    Explore patterns in patient's actions and choices and discuss options for new behaviors.    Cognitive-behavioral Therapy    Discuss common cognitive distortions, identify them in patient's life.    Explore ways to challenge, replace, and act against these cognitions.    Acceptance and Commitment Therapy    Explore and identify important values in patient's life.    Discuss ways to commit to behavioral activation around these values.    Psychodynamic psychotherapy    Discuss patient's emotional dynamics and issues and how they impact behaviors.    Explore patient's history of relationships and how they impact present behaviors.    Explore how to work with and make changes in these schemas and patterns.    Narrative Therapy    Explore the patient's story of his/her life from his/her perspective.    Explore alternate ways of understanding their experience, identifying exceptions,  developing new themes.    Interpersonal Psychotherapy    Explore patterns in relationships that are effective or ineffective at helping patient reach their goals, find satisfying experience.    Discuss new patterns or behaviors to engage in for improved social functioning.    Behavioral Activation    Discuss steps patient can take to become more involved in meaningful activity.    Identify barriers to these activities and explore possible solutions.    Mindfulness-Based Strategies    Discuss skills based on development and application of mindfulness.    Skills drawn from compassion-focused therapy, dialectical behavior therapy, mindfulness-based stress reduction, mindfulness-based cognitive therapy, etc.      Patient has reviewed and agreed to the above plan.      Edmundo Hansen Psy.D, LP   Behavioral Health Clinician   -[facility]     12/20/2023

## 2023-01-19 ENCOUNTER — VIRTUAL VISIT (OUTPATIENT)
Dept: BEHAVIORAL HEALTH | Facility: CLINIC | Age: 43
End: 2023-01-19
Payer: COMMERCIAL

## 2023-01-19 DIAGNOSIS — F33.0 MAJOR DEPRESSIVE DISORDER, RECURRENT EPISODE, MILD (H): Primary | ICD-10-CM

## 2023-01-19 PROCEDURE — 90832 PSYTX W PT 30 MINUTES: CPT | Mod: 95 | Performed by: PSYCHOLOGIST

## 2023-01-19 NOTE — PROGRESS NOTES
"[facility] Integrated Behavioral Health   2023        Behavioral Health Clinician Progress Note    Patient Name: Shadia Mcgrath           Service Type: Phone Visit      Service Location:  Phone Visit      Session Start Time: 1:06  Session End Time: 1:30      Session Length: 16 - 37      Attendees: Patient     Visit Activities (Refresh list every visit): Phone Encounter    Service Modality:  Phone Visit:      Provider verified identity through the following two step process.  Patient provided:  Patient  and Patient is known previously to provider    The patient has been notified of the following:      \"We have found that certain health care needs can be provided without the need for a face to face visit.  This service lets us provide the care you need with a phone conversation.       I will have full access to your [facility] medical record during this entire phone call.   I will be taking notes for your medical record.      Since this is like an office visit, we will bill your insurance company for this service.       There are potential benefits and risks of telephone visits (e.g. limits to patient confidentiality) that differ from in-person visits.?Confidentiality still applies for telephone services, and nobody will record the visit.  It is important to be in a quiet, private space that is free of distractions (including cell phone or other devices) during the visit.??      If during the course of the call I believe a telephone visit is not appropriate, you will not be charged for this service\"     Consent has been obtained for this service by care team member: Yes       Diagnostic Assessment Date: 10/5/2020  Treatment Plan Review Date: 3/20/2023  See Flowsheets for today's PHQ-9 and ZACH-7 results  Previous PHQ-9:   PHQ-9 SCORE 2020   PHQ-9 Total Score 13       DATA  Extended Session (60+ minutes): No  Interactive Complexity: No  Crisis: " No    Treatment Objective(s) Addressed in This Session:  Target Behavior(s): disease management/lifestyle changes related to psychosocial and relational stress    Depressed Mood: Increase interest, engagement, and pleasure in doing things  Decrease frequency and intensity of feeling down, depressed, hopeless  Identify negative self-talk and behaviors: challenge core beliefs, myths, and actions  Alcohol / Substance Use: will increase understanding of the effects of substance use  will make healthier choices in regard to substance use       Current Stressors / Issues:  [facility] met with Shadia today over the phone to continue supporting her treatment of depression and reported difficulties with anger and relationship problems. We discussed the following topics today:    Reports starting Abilify by her psychiatry provider about three weeks ago. She reports the new medication has been helpful in regulating her mood and managing her anger/difficulties with negative self-talk. Imani states concern about falling down the stairs last Saturday and sustaining multiple injuries. She reports going to the ED and plans to go to another outpatient podiatrist visit this afternoon.     She reports taking kratom to assist with pain management. Discussed the multitude of risks of kratom use and recommended Imani consult with her PCP and care team about use moving forward. We discussed that Kratom is not recommended for pain management/opioid alternatives.     Imani had another appointment to attend, so we ended after about 25 minutes.     Progress on Treatment Objective(s) / Homework:  Satisfactory progress - ACTION (Actively working towards change); Intervened by reinforcing change plan / affirming steps taken    Care Plan review completed: Yes     Psycho-education regarding mental health diagnoses and treatment options    Supportive Therapy    Provide affirmations, reflections, and establish working rapport    Emphasize and reflect on  strength of therapeutic relationship    Skills training    Explore skills useful to client in current situation.    Skills include assertiveness, communication, conflict management, problem-solving, relaxation, etc.    Solution-Focused Therapy    Explore patterns in patient's relationships and discuss options for new behaviors.    Explore patterns in patient's actions and choices and discuss options for new behaviors.      Medication Review:  No changes to current psychiatric medication(s)    Medication Compliance:  Yes    Changes in Health Issues:   None reported    Chemical Use Review:   Substance Use: increase in kratom.  Patient reports frequency of use 1-2x daily.  Reviewed concerns related to health related substance abuse risk  Provided encouragement towards sobriety  Discussed treatment options and encouraged patient to schedule an appointment with PCP        Tobacco Use: No current tobacco use.      Assessment: Current Emotional / Mental Status (status of significant symptoms):  Risk status (Self / Other harm or suicidal ideation)  Patient has had a history of suicidal ideation: SI as a youth only and suicide attempts: one prior attempt at age 15; none recent  Patient denies current fears or concerns for personal safety.     Patient denies current or recent suicidal ideation or behaviors.  Patient denies current or recent homicidal ideation or behaviors.  Patient denies current or recent self injurious behavior or ideation.  Patient denies other safety concerns.     A safety and risk management plan has not been developed at this time, however patient was encouraged to call [place] [phone] should there be a change in any of these risk factors.    [place] Suicide Severity Rating Scale (Lifetime/Recent)  [place] Suicide Severity Rating (Lifetime/Recent) 9/21/2020   Wish to be Dead (Lifetime) Yes   Non-Specific Active Suicidal Thoughts (Lifetime) Yes   Most Severe Ideation Rating (Lifetime) 4   Frequency  (Lifetime) 4   Duration (Lifetime) 4   Controllability (Lifetime) 3   Protective Factors  (Lifetime) 2   Reasons for Ideation (Lifetime) 4   RETIRED: 1. Wish to be Dead (Recent) No   RETIRED: 2. Non-Specific Active Suicidal Thoughts (Recent) No   3. Active Suicidal Ideation with any Methods (Not Plan) Without Intent to Act (Lifetime) Yes   RETIRED: 3. Active Suicidal Ideation with any Methods (Not Plan) Without Intent to Act (Recent) No   RETIRE: 4. Active Suicidal Ideation with Some Intent to Act, Without Specific Plan (Lifetime) Yes   4. Active Suicidal Ideation with Some Intent to Act, Without Specific Plan (Recent) No   RETIRE: 5. Active Suicidal Ideation with Specific Plan and Intent (Lifetime) Yes   RETIRED: 5. Active Suicidal Ideation with Specific Plan and Intent (Recent) No   Most Severe Ideation Rating (Past Month) NA   Frequency (Past Month) NA   Duration (Past Month) NA   Controllability (Past Month) NA   Protective Factors (Past Month) NA   Reasons for Ideation (Past Month) NA   Actual Attempt (Lifetime) Yes   Actual Attempt (Past 3 Months) No   Has subject engaged in non-suicidal self-injurious behavior? (Lifetime) No   Has subject engaged in non-suicidal self-injurious behavior? (Past 3 Months) No   Interrupted Attempts (Lifetime) No   Interrupted Attempts (Past 3 Months) No   Aborted or Self-Interrupted Attempt (Lifetime) No   Aborted or Self-Interrupted Attempt (Past 3 Months) No   Preparatory Acts or Behavior (Lifetime) No   Preparatory Acts or Behavior (Past 3 Months) No   Most Recent Attempt Actual Lethality Code 2   Most Lethal Attempt Actual Lethality Code 2   Initial/First Attempt Actual Lethality Code 2       Appearance:   Unable to assess   Eye Contact:   Unable to assess   Psychomotor Behavior: Unable to assess   Attitude:   Cooperative  Friendly Pleasant  Orientation:   All  Speech   Rate / Production: Normal/ Responsive   Volume:  Normal   Mood:    Depressed ;  improving  Affect:    Lethargic   Thought Content:  Clear   Thought Form:  Coherent  Logical   Insight:    Good     Diagnoses:  1. Major depressive disorder, recurrent episode, mild (H)      R/O Dissociative Identity Disorder  R/O PTSD with dissociative features   R/O Borderline Personality Disorder    Collateral Reports Completed:   Not Applicable    Plan: (Homework, other):  Shadia was given information about behavioral services and encouraged to schedule a follow up appointment with the clinic [facility] in 3 weeks. Recommended she follow-up with her PCP about kratom use for pain. Provided information about known risks with Kratom use. CD Recommendations: Avoid kratom use due to disregulated market and known risks associated with use..     Edmundo Hansen Psy.D, LP   Behavioral Health Clinician   [facility]     January 19, 2023      ______________________________________________________________________                                            Individual Treatment Plan    Patient's Name: Shadia Mcgrath  YOB: 1980    Date of Creation: 3/8/2022  Date Treatment Plan Last Reviewed/Revised:   12/20/2023    DSM5 Diagnoses: 296.31 (F33.0) Major Depressive Disorder, Recurrent Episode, Mild _  Psychosocial / Contextual Factors: Hx of difficulties with anger  PROMIS (reviewed every 90 days): not completed by pt    Referral / Collaboration:  Referral to another professional/service is not indicated at this time..    Anticipated number of session for this episode of care: 6-8  Anticipation frequency of session: Every other week  Anticipated Duration of each session: 38-52 minutes  Treatment plan will be reviewed in 90 days or when goals have been changed.       MeasurableTreatment Goal(s) related to diagnosis / functional impairment(s)  Goal 1: Patient will experience a reduction in depressive symptoms along with a corresponding increase in positive emotion and life  satisfaction.    I will know I've met my goal when I dont feel as down all the time.      Objective #A (Patient Action)    Patient will Increase interest, engagement, and pleasure in doing things.  Status: Continued - Date(s):  COMPLETE    Intervention(s)  Therapist will help patient identify pleasant and mastery oriented events that elicit positive, relaxed mood.    Objective #B  Patient will Decrease frequency and intensity of feeling down, depressed, hopeless.  Status: Continued - Date(s):  COMPLETE    Intervention(s)  Therapist will introduce patient to cognitive-behavioral and acceptance and commitment therapy topics aimed to help reduce depression and anxiety    Objective #C  Patient will Identify negative self-talk and behaviors: challenge core beliefs, myths, and actions  Improve concentration, focus, and mindfulness in daily activities .  Status: Continued - Date(s):  12/20/2023    Intervention(s)  Therapist will help patient identify and manage negative self-talk and automatic thoughts; introduce patient to cognitive distortions; help patient develop cognitive diffusion techniques      Goal 2: Patiient will learn adaptive ways to successfully manage conflict and difficulties with anger while also experiencing a corresponding improvement in relationship satisfaction    I will know I've met my goal when I don't get so angry.      Objective #A (Patient Action)    Status: Continued - Date(s):  COMPLETE    Patient will identify patterns of escalation  (i.e. tightness in chest, flushed face, increased heart rate, clenched hands, etc.).    Intervention(s)  Therapist will help patient identify physical and cognitive factors associated with anger to improve mindfulness.    Objective #B  Patient will use progressive relaxation exercise once in the morning and once in the evening to help relieve tension  practice deep diaphragm breathing once daily for at least 5 minutes to reduce anger / irritability and regain a  calmer, more clear state of mind.    Status: Continued - Date(s):  COMPLETE    Intervention(s)  Therapist will help patient identify breathing and relaxation techniques to help manage anger.    Objective #C  Patient will identify at least 2-3 alternative response(s) to aggressive behaviors  identify at least 2-3 techniques for intervening on the escalation.  Status: Continued - Date(s):  12/20/2023    Intervention(s)  Therapist will help patient identify alternative, adaptive responses to anger and aggressive behaviors toward others and ways to successfuly de-escalate inteprersonal situations where conflict occurs.    Other Possible Therapeutic Intervention(s):    Psycho-education regarding mental health diagnoses and treatment options    Supportive Therapy    Provide affirmations, reflections, and establish working rapport    Emphasize and reflect on strength of therapeutic relationship    Skills training    Explore skills useful to client in current situation.    Skills include assertiveness, communication, conflict management, problem-solving, relaxation, etc.    Solution-Focused Therapy    Explore patterns in patient's relationships and discuss options for new behaviors.    Explore patterns in patient's actions and choices and discuss options for new behaviors.    Cognitive-behavioral Therapy    Discuss common cognitive distortions, identify them in patient's life.    Explore ways to challenge, replace, and act against these cognitions.    Acceptance and Commitment Therapy    Explore and identify important values in patient's life.    Discuss ways to commit to behavioral activation around these values.    Psychodynamic psychotherapy    Discuss patient's emotional dynamics and issues and how they impact behaviors.    Explore patient's history of relationships and how they impact present behaviors.    Explore how to work with and make changes in these schemas and patterns.    Narrative Therapy    Explore the  patient's story of his/her life from his/her perspective.    Explore alternate ways of understanding their experience, identifying exceptions, developing new themes.    Interpersonal Psychotherapy    Explore patterns in relationships that are effective or ineffective at helping patient reach their goals, find satisfying experience.    Discuss new patterns or behaviors to engage in for improved social functioning.    Behavioral Activation    Discuss steps patient can take to become more involved in meaningful activity.    Identify barriers to these activities and explore possible solutions.    Mindfulness-Based Strategies    Discuss skills based on development and application of mindfulness.    Skills drawn from compassion-focused therapy, dialectical behavior therapy, mindfulness-based stress reduction, mindfulness-based cognitive therapy, etc.      Patient has reviewed and agreed to the above plan.      Edmundo Hansen Psy.D, LP   Behavioral Health Clinician   -[facility]     12/20/2023

## 2023-02-21 ENCOUNTER — VIRTUAL VISIT (OUTPATIENT)
Dept: BEHAVIORAL HEALTH | Facility: CLINIC | Age: 43
End: 2023-02-21
Payer: COMMERCIAL

## 2023-02-21 DIAGNOSIS — Z91.199 NO-SHOW FOR APPOINTMENT: Primary | ICD-10-CM

## 2023-02-21 NOTE — PROGRESS NOTES
No Show:    This patient was a no show for this scheduled appointment. Made two call attempts, both went to . Voicemail box is full, could not leave message.     Edmundo Hansen, LP

## 2023-02-22 NOTE — TELEPHONE ENCOUNTER
Her nasal symptoms are gone after antibiotics.    She still has a cough with some sputum production.     I managed to review [place] records. She has a history of Asthma with eleveated FeNO.  She also has an extensive GI history. I did not see the actual pH study, but from the notes it sounds like she has quite a lot of reflux.  This could certainly be contributing, if not causing her symptoms.     Another possibility that came up with our blood testing would be an immune deficiency like CVID.  Her IgG level is low.  I will repeat this. If still slow will send to Dr. Baca in allergy for evaluation of CVID.     Kleber Guy MD   none

## 2023-05-07 ENCOUNTER — HEALTH MAINTENANCE LETTER (OUTPATIENT)
Age: 43
End: 2023-05-07

## 2023-07-30 ENCOUNTER — HEALTH MAINTENANCE LETTER (OUTPATIENT)
Age: 43
End: 2023-07-30

## 2023-11-14 DIAGNOSIS — J45.909 SEVERE ASTHMA, UNSPECIFIED WHETHER COMPLICATED, UNSPECIFIED WHETHER PERSISTENT: ICD-10-CM

## 2023-11-14 RX ORDER — AZITHROMYCIN 250 MG/1
250 TABLET, FILM COATED ORAL DAILY
Qty: 30 TABLET | Refills: 11 | OUTPATIENT
Start: 2023-11-14

## 2023-11-14 NOTE — TELEPHONE ENCOUNTER
Chief Complaint   Patient presents with    Refill Request     Azithromycin mg      Refill request received via fax by pharmacy   [id number] - [place], [place] - [address]      Pending Prescriptions:                       Disp   Refills    azithromycin (ZITHROMAX) 250 MG tablet    30 tab*11           Sig: Take 1 tablet (250 mg) by mouth daily         Last Written Prescription Date:  10/11/22  Last Fill Quantity: 30,   # refills: 11  Last Office Visit with prescribing provider: none. Orders only on 1/17/2020  Future Office visit: none

## 2023-12-05 NOTE — TELEPHONE ENCOUNTER
[facility] message sent to patient 11/14/23. Closing this encounter  ACase/MA  [facility]

## 2024-07-14 ENCOUNTER — HEALTH MAINTENANCE LETTER (OUTPATIENT)
Age: 44
End: 2024-07-14

## 2025-07-19 ENCOUNTER — HEALTH MAINTENANCE LETTER (OUTPATIENT)
Age: 45
End: 2025-07-19

## 2025-08-09 ENCOUNTER — HEALTH MAINTENANCE LETTER (OUTPATIENT)
Age: 45
End: 2025-08-09